# Patient Record
Sex: MALE | Race: WHITE | NOT HISPANIC OR LATINO | Employment: FULL TIME | URBAN - METROPOLITAN AREA
[De-identification: names, ages, dates, MRNs, and addresses within clinical notes are randomized per-mention and may not be internally consistent; named-entity substitution may affect disease eponyms.]

---

## 2017-01-11 ENCOUNTER — ALLSCRIPTS OFFICE VISIT (OUTPATIENT)
Dept: OTHER | Facility: OTHER | Age: 52
End: 2017-01-11

## 2017-01-19 ENCOUNTER — GENERIC CONVERSION - ENCOUNTER (OUTPATIENT)
Dept: OTHER | Facility: OTHER | Age: 52
End: 2017-01-19

## 2017-02-07 ENCOUNTER — ALLSCRIPTS OFFICE VISIT (OUTPATIENT)
Dept: OTHER | Facility: OTHER | Age: 52
End: 2017-02-07

## 2017-02-27 DIAGNOSIS — E11.29 TYPE 2 DIABETES MELLITUS WITH OTHER DIABETIC KIDNEY COMPLICATION (HCC): ICD-10-CM

## 2017-02-27 DIAGNOSIS — B18.2 CHRONIC VIRAL HEPATITIS C (HCC): ICD-10-CM

## 2017-02-27 DIAGNOSIS — R80.9 PROTEINURIA: ICD-10-CM

## 2017-02-27 DIAGNOSIS — I48.91 ATRIAL FIBRILLATION (HCC): ICD-10-CM

## 2017-03-27 ENCOUNTER — TRANSCRIBE ORDERS (OUTPATIENT)
Dept: ADMINISTRATIVE | Facility: HOSPITAL | Age: 52
End: 2017-03-27

## 2017-03-27 ENCOUNTER — APPOINTMENT (OUTPATIENT)
Dept: LAB | Facility: MEDICAL CENTER | Age: 52
End: 2017-03-27
Payer: COMMERCIAL

## 2017-03-27 DIAGNOSIS — R80.9 PROTEINURIA: ICD-10-CM

## 2017-03-27 DIAGNOSIS — B18.2 CHRONIC VIRAL HEPATITIS C (HCC): ICD-10-CM

## 2017-03-27 DIAGNOSIS — I48.91 ATRIAL FIBRILLATION (HCC): ICD-10-CM

## 2017-03-27 DIAGNOSIS — E11.29 TYPE 2 DIABETES MELLITUS WITH OTHER DIABETIC KIDNEY COMPLICATION (HCC): ICD-10-CM

## 2017-03-27 LAB
ALBUMIN SERPL BCP-MCNC: 3.7 G/DL (ref 3.5–5)
ALP SERPL-CCNC: 62 U/L (ref 46–116)
ALT SERPL W P-5'-P-CCNC: 67 U/L (ref 12–78)
ANION GAP SERPL CALCULATED.3IONS-SCNC: 7 MMOL/L (ref 4–13)
AST SERPL W P-5'-P-CCNC: 30 U/L (ref 5–45)
BASOPHILS # BLD AUTO: 0.01 THOUSANDS/ΜL (ref 0–0.1)
BASOPHILS NFR BLD AUTO: 0 % (ref 0–1)
BILIRUB SERPL-MCNC: 0.88 MG/DL (ref 0.2–1)
BUN SERPL-MCNC: 16 MG/DL (ref 5–25)
CALCIUM SERPL-MCNC: 8.5 MG/DL (ref 8.3–10.1)
CHLORIDE SERPL-SCNC: 104 MMOL/L (ref 100–108)
CHOLEST SERPL-MCNC: 122 MG/DL (ref 50–200)
CO2 SERPL-SCNC: 31 MMOL/L (ref 21–32)
CREAT SERPL-MCNC: 0.93 MG/DL (ref 0.6–1.3)
CREAT UR-MCNC: 107 MG/DL
EOSINOPHIL # BLD AUTO: 0.08 THOUSAND/ΜL (ref 0–0.61)
EOSINOPHIL NFR BLD AUTO: 1 % (ref 0–6)
ERYTHROCYTE [DISTWIDTH] IN BLOOD BY AUTOMATED COUNT: 13.6 % (ref 11.6–15.1)
EST. AVERAGE GLUCOSE BLD GHB EST-MCNC: 117 MG/DL
GFR SERPL CREATININE-BSD FRML MDRD: >60 ML/MIN/1.73SQ M
GLUCOSE P FAST SERPL-MCNC: 130 MG/DL (ref 65–99)
HBA1C MFR BLD: 5.7 % (ref 4.2–6.3)
HCT VFR BLD AUTO: 46.1 % (ref 36.5–49.3)
HDLC SERPL-MCNC: 44 MG/DL (ref 40–60)
HGB BLD-MCNC: 16.3 G/DL (ref 12–17)
LDLC SERPL CALC-MCNC: 63 MG/DL (ref 0–100)
LYMPHOCYTES # BLD AUTO: 1.25 THOUSANDS/ΜL (ref 0.6–4.47)
LYMPHOCYTES NFR BLD AUTO: 21 % (ref 14–44)
MCH RBC QN AUTO: 32 PG (ref 26.8–34.3)
MCHC RBC AUTO-ENTMCNC: 35.4 G/DL (ref 31.4–37.4)
MCV RBC AUTO: 91 FL (ref 82–98)
MICROALBUMIN UR-MCNC: 171 MG/L (ref 0–20)
MICROALBUMIN/CREAT 24H UR: 160 MG/G CREATININE (ref 0–30)
MONOCYTES # BLD AUTO: 0.55 THOUSAND/ΜL (ref 0.17–1.22)
MONOCYTES NFR BLD AUTO: 9 % (ref 4–12)
NEUTROPHILS # BLD AUTO: 4.1 THOUSANDS/ΜL (ref 1.85–7.62)
NEUTS SEG NFR BLD AUTO: 69 % (ref 43–75)
NRBC BLD AUTO-RTO: 0 /100 WBCS
PLATELET # BLD AUTO: 99 THOUSANDS/UL (ref 149–390)
PMV BLD AUTO: 11.4 FL (ref 8.9–12.7)
POTASSIUM SERPL-SCNC: 3.7 MMOL/L (ref 3.5–5.3)
PROT SERPL-MCNC: 6.8 G/DL (ref 6.4–8.2)
RBC # BLD AUTO: 5.09 MILLION/UL (ref 3.88–5.62)
SODIUM SERPL-SCNC: 142 MMOL/L (ref 136–145)
TRIGL SERPL-MCNC: 77 MG/DL
TSH SERPL DL<=0.05 MIU/L-ACNC: 1.98 UIU/ML (ref 0.36–3.74)
WBC # BLD AUTO: 6 THOUSAND/UL (ref 4.31–10.16)

## 2017-03-27 PROCEDURE — 83036 HEMOGLOBIN GLYCOSYLATED A1C: CPT

## 2017-03-27 PROCEDURE — 36415 COLL VENOUS BLD VENIPUNCTURE: CPT

## 2017-03-27 PROCEDURE — 82043 UR ALBUMIN QUANTITATIVE: CPT

## 2017-03-27 PROCEDURE — 82570 ASSAY OF URINE CREATININE: CPT

## 2017-03-27 PROCEDURE — 80053 COMPREHEN METABOLIC PANEL: CPT

## 2017-03-27 PROCEDURE — 85025 COMPLETE CBC W/AUTO DIFF WBC: CPT

## 2017-03-27 PROCEDURE — 84443 ASSAY THYROID STIM HORMONE: CPT

## 2017-03-27 PROCEDURE — 80061 LIPID PANEL: CPT

## 2017-03-28 ENCOUNTER — GENERIC CONVERSION - ENCOUNTER (OUTPATIENT)
Dept: OTHER | Facility: OTHER | Age: 52
End: 2017-03-28

## 2017-04-06 ENCOUNTER — GENERIC CONVERSION - ENCOUNTER (OUTPATIENT)
Dept: OTHER | Facility: OTHER | Age: 52
End: 2017-04-06

## 2017-07-11 ENCOUNTER — GENERIC CONVERSION - ENCOUNTER (OUTPATIENT)
Dept: OTHER | Facility: OTHER | Age: 52
End: 2017-07-11

## 2017-07-11 LAB
LEFT EYE DIABETIC RETINOPATHY: NORMAL
RIGHT EYE DIABETIC RETINOPATHY: NORMAL

## 2017-07-25 ENCOUNTER — ALLSCRIPTS OFFICE VISIT (OUTPATIENT)
Dept: OTHER | Facility: OTHER | Age: 52
End: 2017-07-25

## 2017-07-25 DIAGNOSIS — M79.673 PAIN OF FOOT: ICD-10-CM

## 2017-07-25 DIAGNOSIS — M25.579 PAIN IN ANKLE: ICD-10-CM

## 2017-08-15 ENCOUNTER — GENERIC CONVERSION - ENCOUNTER (OUTPATIENT)
Dept: OTHER | Facility: OTHER | Age: 52
End: 2017-08-15

## 2017-10-31 ENCOUNTER — ALLSCRIPTS OFFICE VISIT (OUTPATIENT)
Dept: OTHER | Facility: OTHER | Age: 52
End: 2017-10-31

## 2017-11-06 ENCOUNTER — APPOINTMENT (OUTPATIENT)
Dept: LAB | Facility: MEDICAL CENTER | Age: 52
End: 2017-11-06
Payer: COMMERCIAL

## 2017-11-06 ENCOUNTER — TRANSCRIBE ORDERS (OUTPATIENT)
Dept: ADMINISTRATIVE | Facility: HOSPITAL | Age: 52
End: 2017-11-06

## 2017-11-06 DIAGNOSIS — B18.2 CHRONIC HEPATITIS C WITH HEPATIC COMA (HCC): Primary | ICD-10-CM

## 2017-11-06 DIAGNOSIS — B18.2 CHRONIC HEPATITIS C WITH HEPATIC COMA (HCC): ICD-10-CM

## 2017-11-06 DIAGNOSIS — E11.22 TYPE 2 DIABETES MELLITUS WITH CHRONIC KIDNEY DISEASE, WITH LONG-TERM CURRENT USE OF INSULIN, UNSPECIFIED CKD STAGE (HCC): ICD-10-CM

## 2017-11-06 DIAGNOSIS — M79.673 PAIN OF FOOT, UNSPECIFIED LATERALITY: ICD-10-CM

## 2017-11-06 DIAGNOSIS — B35.3 TINEA PEDIS, UNSPECIFIED LATERALITY: ICD-10-CM

## 2017-11-06 DIAGNOSIS — I48.91 ATRIAL FIBRILLATION, UNSPECIFIED TYPE (HCC): ICD-10-CM

## 2017-11-06 DIAGNOSIS — M25.579 PAIN IN JOINT INVOLVING ANKLE AND FOOT, UNSPECIFIED LATERALITY: ICD-10-CM

## 2017-11-06 DIAGNOSIS — R80.9 PROTEINURIA, UNSPECIFIED TYPE: ICD-10-CM

## 2017-11-06 DIAGNOSIS — Z79.4 TYPE 2 DIABETES MELLITUS WITH CHRONIC KIDNEY DISEASE, WITH LONG-TERM CURRENT USE OF INSULIN, UNSPECIFIED CKD STAGE (HCC): ICD-10-CM

## 2017-11-06 LAB
ALBUMIN SERPL BCP-MCNC: 3.9 G/DL (ref 3.5–5)
ALP SERPL-CCNC: 65 U/L (ref 46–116)
ALT SERPL W P-5'-P-CCNC: 69 U/L (ref 12–78)
ANION GAP SERPL CALCULATED.3IONS-SCNC: 7 MMOL/L (ref 4–13)
AST SERPL W P-5'-P-CCNC: 33 U/L (ref 5–45)
BILIRUB SERPL-MCNC: 0.95 MG/DL (ref 0.2–1)
BUN SERPL-MCNC: 17 MG/DL (ref 5–25)
CALCIUM SERPL-MCNC: 8.5 MG/DL (ref 8.3–10.1)
CHLORIDE SERPL-SCNC: 103 MMOL/L (ref 100–108)
CHOLEST SERPL-MCNC: 123 MG/DL (ref 50–200)
CO2 SERPL-SCNC: 29 MMOL/L (ref 21–32)
CREAT SERPL-MCNC: 0.8 MG/DL (ref 0.6–1.3)
CREAT UR-MCNC: 72.5 MG/DL
ERYTHROCYTE [DISTWIDTH] IN BLOOD BY AUTOMATED COUNT: 13.8 % (ref 11.6–15.1)
EST. AVERAGE GLUCOSE BLD GHB EST-MCNC: 111 MG/DL
FERRITIN SERPL-MCNC: 305 NG/ML (ref 8–388)
GFR SERPL CREATININE-BSD FRML MDRD: 102 ML/MIN/1.73SQ M
GLUCOSE P FAST SERPL-MCNC: 124 MG/DL (ref 65–99)
HBA1C MFR BLD: 5.5 % (ref 4.2–6.3)
HCT VFR BLD AUTO: 46.8 % (ref 36.5–49.3)
HDLC SERPL-MCNC: 43 MG/DL (ref 40–60)
HGB BLD-MCNC: 16.1 G/DL (ref 12–17)
INR PPP: 1.16 (ref 0.86–1.16)
IRON SERPL-MCNC: 86 UG/DL (ref 65–175)
LDLC SERPL CALC-MCNC: 65 MG/DL (ref 0–100)
MCH RBC QN AUTO: 31.4 PG (ref 26.8–34.3)
MCHC RBC AUTO-ENTMCNC: 34.4 G/DL (ref 31.4–37.4)
MCV RBC AUTO: 91 FL (ref 82–98)
MICROALBUMIN UR-MCNC: 105 MG/L (ref 0–20)
MICROALBUMIN/CREAT 24H UR: 145 MG/G CREATININE (ref 0–30)
PLATELET # BLD AUTO: 105 THOUSANDS/UL (ref 149–390)
PMV BLD AUTO: 10.8 FL (ref 8.9–12.7)
POTASSIUM SERPL-SCNC: 3.5 MMOL/L (ref 3.5–5.3)
PROT SERPL-MCNC: 7.3 G/DL (ref 6.4–8.2)
PROTHROMBIN TIME: 14.9 SECONDS (ref 12.1–14.4)
RBC # BLD AUTO: 5.12 MILLION/UL (ref 3.88–5.62)
SODIUM SERPL-SCNC: 139 MMOL/L (ref 136–145)
TIBC SERPL-MCNC: 272 UG/DL (ref 250–450)
TRIGL SERPL-MCNC: 76 MG/DL
TSH SERPL DL<=0.05 MIU/L-ACNC: 1.75 UIU/ML (ref 0.36–3.74)
WBC # BLD AUTO: 5.48 THOUSAND/UL (ref 4.31–10.16)

## 2017-11-06 PROCEDURE — 82247 BILIRUBIN TOTAL: CPT

## 2017-11-06 PROCEDURE — 87902 NFCT AGT GNTYP ALYS HEP C: CPT

## 2017-11-06 PROCEDURE — 82043 UR ALBUMIN QUANTITATIVE: CPT

## 2017-11-06 PROCEDURE — 80061 LIPID PANEL: CPT

## 2017-11-06 PROCEDURE — 87900 PHENOTYPE INFECT AGENT DRUG: CPT

## 2017-11-06 PROCEDURE — 83010 ASSAY OF HAPTOGLOBIN QUANT: CPT

## 2017-11-06 PROCEDURE — 83883 ASSAY NEPHELOMETRY NOT SPEC: CPT

## 2017-11-06 PROCEDURE — 84443 ASSAY THYROID STIM HORMONE: CPT

## 2017-11-06 PROCEDURE — 86709 HEPATITIS A IGM ANTIBODY: CPT

## 2017-11-06 PROCEDURE — 86708 HEPATITIS A ANTIBODY: CPT

## 2017-11-06 PROCEDURE — 82570 ASSAY OF URINE CREATININE: CPT

## 2017-11-06 PROCEDURE — 83036 HEMOGLOBIN GLYCOSYLATED A1C: CPT

## 2017-11-06 PROCEDURE — 82728 ASSAY OF FERRITIN: CPT

## 2017-11-06 PROCEDURE — 87522 HEPATITIS C REVRS TRNSCRPJ: CPT

## 2017-11-06 PROCEDURE — 80307 DRUG TEST PRSMV CHEM ANLYZR: CPT

## 2017-11-06 PROCEDURE — 36415 COLL VENOUS BLD VENIPUNCTURE: CPT

## 2017-11-06 PROCEDURE — 85027 COMPLETE CBC AUTOMATED: CPT

## 2017-11-06 PROCEDURE — 82977 ASSAY OF GGT: CPT

## 2017-11-06 PROCEDURE — 86705 HEP B CORE ANTIBODY IGM: CPT

## 2017-11-06 PROCEDURE — 82172 ASSAY OF APOLIPOPROTEIN: CPT

## 2017-11-06 PROCEDURE — 80053 COMPREHEN METABOLIC PANEL: CPT

## 2017-11-06 PROCEDURE — 85610 PROTHROMBIN TIME: CPT

## 2017-11-06 PROCEDURE — 87340 HEPATITIS B SURFACE AG IA: CPT

## 2017-11-06 PROCEDURE — 86706 HEP B SURFACE ANTIBODY: CPT

## 2017-11-06 PROCEDURE — 84460 ALANINE AMINO (ALT) (SGPT): CPT

## 2017-11-06 PROCEDURE — 86704 HEP B CORE ANTIBODY TOTAL: CPT

## 2017-11-06 PROCEDURE — 83540 ASSAY OF IRON: CPT

## 2017-11-06 PROCEDURE — 83550 IRON BINDING TEST: CPT

## 2017-11-07 ENCOUNTER — GENERIC CONVERSION - ENCOUNTER (OUTPATIENT)
Dept: OTHER | Facility: OTHER | Age: 52
End: 2017-11-07

## 2017-11-07 ENCOUNTER — APPOINTMENT (OUTPATIENT)
Dept: LAB | Facility: HOSPITAL | Age: 52
End: 2017-11-07
Payer: COMMERCIAL

## 2017-11-07 ENCOUNTER — TRANSCRIBE ORDERS (OUTPATIENT)
Dept: ADMINISTRATIVE | Facility: HOSPITAL | Age: 52
End: 2017-11-07

## 2017-11-07 DIAGNOSIS — B18.2 CHRONIC HEPATITIS C WITH HEPATIC COMA (HCC): ICD-10-CM

## 2017-11-07 DIAGNOSIS — B18.2 CHRONIC HEPATITIS C WITH HEPATIC COMA (HCC): Primary | ICD-10-CM

## 2017-11-07 LAB
AMPHETAMINES UR QL SCN: NEGATIVE NG/ML
BARBITURATES UR QL SCN: NEGATIVE NG/ML
BENZODIAZ UR QL SCN: NEGATIVE NG/ML
BZE UR QL SCN: NEGATIVE NG/ML
CANNABINOIDS UR QL SCN: NEGATIVE NG/ML
ETHANOL UR-MCNC: NEGATIVE %
HAV AB SER QL IA: NORMAL
HAV IGM SER QL: NORMAL
HBV CORE AB SER QL: NORMAL
HBV CORE IGM SER QL: NORMAL
HBV SURFACE AB SER-ACNC: <3.1 MIU/ML
HBV SURFACE AG SER QL: NORMAL
METHADONE UR QL SCN: NEGATIVE NG/ML
OPIATES UR QL: NEGATIVE NG/ML
PCP UR QL: NEGATIVE NG/ML
PROPOXYPH UR QL: NEGATIVE NG/ML

## 2017-11-07 PROCEDURE — 36415 COLL VENOUS BLD VENIPUNCTURE: CPT

## 2017-11-07 PROCEDURE — 82595 ASSAY OF CRYOGLOBULIN: CPT

## 2017-11-08 ENCOUNTER — ALLSCRIPTS OFFICE VISIT (OUTPATIENT)
Dept: OTHER | Facility: OTHER | Age: 52
End: 2017-11-08

## 2017-11-08 LAB
HCV RNA SERPL NAA+PROBE-ACNC: NORMAL IU/ML
HCV RNA SERPL NAA+PROBE-LOG IU: 6.53 LOG10 IU/ML
TEST INFORMATION: NORMAL

## 2017-11-09 DIAGNOSIS — D69.6 THROMBOCYTOPENIA (HCC): ICD-10-CM

## 2017-11-09 DIAGNOSIS — Z12.11 ENCOUNTER FOR SCREENING FOR MALIGNANT NEOPLASM OF COLON: ICD-10-CM

## 2017-11-09 DIAGNOSIS — I48.91 ATRIAL FIBRILLATION (HCC): ICD-10-CM

## 2017-11-09 DIAGNOSIS — R35.1 NOCTURIA: ICD-10-CM

## 2017-11-09 LAB
A2 MACROGLOB SERPL-MCNC: 387 MG/DL (ref 110–276)
ALT SERPL W P-5'-P-CCNC: 66 IU/L (ref 0–55)
APO A-I SERPL-MCNC: 140 MG/DL (ref 101–178)
BILIRUB SERPL-MCNC: 0.8 MG/DL (ref 0–1.2)
COMMENT: ABNORMAL
FIBROSIS SCORING:: ABNORMAL
FIBROSIS STAGE SERPL QL: ABNORMAL
GGT SERPL-CCNC: 39 IU/L (ref 0–65)
HAPTOGLOB SERPL-MCNC: 19 MG/DL (ref 34–200)
INTERPRETATIONS: ABNORMAL
LIVER FIBR SCORE SERPL CALC.FIBROSURE: 0.86 (ref 0–0.21)
NECROINFLAMM ACTIVITY SCORING:: ABNORMAL
NECROINFLAMMATORY ACT GRADE SERPL QL: ABNORMAL
NECROINFLAMMATORY ACT SCORE SERPL: 0.6 (ref 0–0.17)
SERVICE CMNT-IMP: ABNORMAL

## 2017-11-10 LAB
HCV GENTYP SERPL NAA+PROBE: NORMAL
HCV PLEASE NOTE: NORMAL

## 2017-11-14 LAB — CRYOGLOB SER QL 1D COLD INC: NORMAL

## 2017-11-17 ENCOUNTER — ALLSCRIPTS OFFICE VISIT (OUTPATIENT)
Dept: OTHER | Facility: OTHER | Age: 52
End: 2017-11-17

## 2017-11-17 LAB — MISCELLANEOUS LAB TEST RESULT: NORMAL

## 2017-11-19 NOTE — PROGRESS NOTES
Assessment  Assessed    1  Atrial fibrillation (427 31) (I48 91)    Plan  Atrial fibrillation    · Digoxin 250 MCG Oral Tablet; take 1 tablet every day  NO MORE SCRIPTS AFTERTHIS UNTIL PATIENT SCHEDULES APPOINTMENT   Rx By: Valarie Montoya; Dispense: 30 Days ; #:30 Tablet; Refill: 11; For: Atrial fibrillation; TALITA = N; Verified Transmission to [facility] #[id number] Last Updated By: System, [employer]; 11/17/2017 4:12:12 PM   · (1) DIGOXIN; Status:Active; Requested for:31Qap4246;    Perform:[facility]; BWI:65RMJ9794; Ordered; For:Atrial fibrillation; Ordered By:Iglesia Weiss;   · EKG/ECG- POC; Status:Complete;   Done: 76SKK2724 03:39PM   Perform: In Office; Last Updated Jai Werner; 11/17/2017 3:39:11 PM;Ordered;fibrillation; Ordered By:Jessica Weissr;   · Follow Up in 2 Years Evaluation and Treatment  Follow-up  Status: Hold For - Scheduling Requested for: 14OOA3295   Ordered;Atrial fibrillation; Ordered By: Valarie Montoya Performed:  Due: 24LAU3019    Discussion/Summary  Cardiology Discussion Summary Free Text Note Form [facility]: It is my impression that the patient is doing well with the diagnosis of atrial fibrillation  His rate is well controlled on monotherapy with digoxin  I have ordered a digoxin level in the near future  He has not had any palpitations  He remains on aspirin for stroke prophylaxis  He has a chads vasc score of 1 (DM) and I think this is reasonable strategy  I will see him again in 2 years time  Chief Complaint  Chief Complaint Free Text Note Form: 18 month FU of palpitations and atrial fibrillation   Chief Complaint Chronic Condition [facility]: Patient is here today for follow up of chronic conditions described in HPI        History of Present Illness  Cardiology HPI Free Text Note Form [facility]: Since the patient's last visit he denies chest pain, shortness of breath, palpitations, edema or lightheadedness      Review of Systems  Cardiology Male ROS:    Cardiac: rhythm problems, but-- no chest pain,-- no fainting/blackouts,-- no heart murmur present,-- no signs of swelling-- and-- no palpitations present  Skin: No complaints of nonhealing sores or skin rash  Genitourinary: erectile dysfunction, but-- no recurrent urinary tract infections,-- no frequent urination at night,-- no difficult urination,-- no blood in urine,-- no kidney stones,-- no loss of bladder control,-- no kidney problems-- and-- no prostate problems  Psychological: No complaints of feeling depressed, anxiety, panic attacks, or difficulty concentrating  General: No complaints of trouble sleeping, lack of energy, fatigue, appetite changes, weight changes, fever, frequent infections, or night sweats  Respiratory: No complaints of shortness of breath, cough with sputum, or wheezing  HEENT: No complaints of serious problems, hearing problems, nose problems, throat problems, or snoring  Gastrointestinal: No complaints of liver problems, nausea, vomiting, heartburn, constipation, bloody stools, diarrhea, problems swallowing, adbominal pain, or rectal bleeding  Hematologic: No complaints of bleeding disorders, anemia, blood clots, or excessive brusing  Neurological: No complaints of numbness, tingling, dizziness, weakness, seizures, headaches, syncope or fainting, AM fatigue, daytime sleepiness, no witnessed apnea episodes  Musculoskeletal: arthritis, but-- no back pain-- and-- no swelling/pain   ROS Reviewed:   ROS reviewed  Active Problems  Problems    1  Allergic rhinitis (477 9) (J30 9)   2  Atrial fibrillation (427 31) (I48 91)   3  Chronic hepatitis C virus infection (070 54) (B18 2)   4  Colon cancer screening (V76 51) (Z12 11)   5  Colon polyp (211 3) (K63 5)   6  DM (diabetes mellitus) type II controlled with renal manifestation (250 40) (E11 29)   7  Encounter for prostate cancer screening (V76 44) (Z12 5)   8  Erectile dysfunction (607 84) (N52 9)   9   Erectile dysfunction of non-organic origin (302 72) (F52 21)   10  Esophageal reflux (530 81) (K21 9)   11  Microalbuminuria (791 0) (R80 9)   12  Nocturia (788 43) (R35 1)   13  Palpitations (785 1) (R00 2)   14  Right bundle branch block (426 4) (I45 10)   15  Screening for diabetic retinopathy (V80 2) (Z13 5)   16  Thrombocytopenia (287 5) (D69 6)   17  Tobacco abuse (305 1) (Z72 0)    Past Medical History  Active Problems And Past Medical History Reviewed: The active problems and past medical history were reviewed and updated today  Surgical History  Problems    1  History of [facility] For Evacuation Of Subdural Hematoma   2  History of Inguinal Hernia Repair   3  History of Shoulder Surgery  Surgical History Reviewed: The surgical history was reviewed and updated today  Family History  Mother    1  Family history of Atrial Fibrillation   2  Family history of Hypertension (V17 49)  Family History Reviewed: The family history was reviewed and updated today  Social History  Problems    · Being A Social Drinker   · Employed   · Exposure to second hand smoke (V15 89) (Z77 22)   · Former smoker (V15 82) (W06 545)   · History of illicit drug use (683 90) (Z87 898)   · Sexual Orientation Heterosexual   · Sexually Active Monogamous Relationship  Social History Reviewed: The social history was reviewed and updated today  The social history was reviewed and is unchanged  Current Meds   1  Aspirin 81 MG TABS; Therapy: 21Jan2013 to Recorded   2  Digoxin 250 MCG Oral Tablet; take 1 tablet every day  NO MORE SCRIPTS AFTER THIS UNTIL PATIENT SCHEDULES APPOINTMENT; Therapy: 56PYV4833 to (Evaluate:10Gpg0802)  Requested for: 37Vqu2653; Last Rx:74Wji8514 Ordered   3  Lisinopril 2 5 MG Oral Tablet; TAKE 1 TABLET DAILY AS DIRECTED; Therapy: 97FSS7044 to (Wilfred Prado)  Requested for: 42AHE0838; Last Rx:08Nov2017 Ordered   4  MetFORMIN HCl - 500 MG Oral Tablet; take 1 tablet twice a day;  Therapy: 92FMP3481 to (Evaluate:85Wat7467)  Requested for: 25HCF1992; Last PK:24DFX3619 Ordered   5  Omeprazole 20 MG Oral Capsule Delayed Release; take 1 capsule daily; Therapy: 63CSE0462 to (Evaluate:51Qfb6516)  Requested for: 09CUT3671; Last Rx:81Kjq6392 Ordered   6  OneTouch Ultra Blue In Vitro Strip; TEST THREE TIMES DAILY; Therapy: 33YPR2434 to (Evaluate:66Fcw7196)  Requested for: 51GED8498; Last Rx:27Zqj9139 Ordered   7  OneTouch Ultra System w/Device Kit; USE AS DIRECTED; Therapy: 80YLK1703 to (Last Rx:06Oct2014)  Requested for: 28Oct2014 Ordered   8  OneTouch UltraSoft Lancets Miscellaneous; TEST Three times DAILY DX: 250; Therapy: 42GEK2321 to (Evaluate:97Sqa9109)  Requested for: 65QMT9576; Last Rx:27Rsu1383 Ordered   9  Viagra 100 MG Oral Tablet; TAKE 1 TABLET 1 HOUR BEFORE ACTIVITY DAILY AS NEEDED; Therapy: 94NRY4020 to (Last Rx:11Jan2017) Ordered  Medication List Reviewed: The medication list was reviewed and updated today  Allergies  Medication    1  No Known Drug Allergies    Vitals  Vital Signs    Recorded: 51NGD5019 03:39PM   Heart Rate 73   Pulse Quality Irregular, L Radial   Respiration Quality Normal   Respiration 16   Systolic 035   Diastolic 84   Height 5 ft 11 in   Weight 212 lb 2 oz   BMI Calculated 29 59   BSA Calculated 2 16       Physical Exam   Constitutional  General appearance: No acute distress, well appearing and well nourished  Eyes  Conjunctiva and Sclera examination: Conjunctiva pink, sclera anicteric  Ears, Nose, Mouth, and Throat - Oropharynx: Clear, nares are clear, mucous membranes are moist   Neck  Neck and thyroid: Normal, supple, trachea midline, no thyromegaly  Pulmonary  Auscultation of lungs: Clear to auscultation, no rales, no rhonchi, no wheezing, good air movement  Cardiovascular  Auscultation of heart: Abnormal  -- Irregular w/o murmur rub or gallop  Carotid pulses: Normal, 2+ bilaterally  Pedal pulses: Normal, 2+ bilaterally     Examination of extremities for edema and/or varicosities: Normal    Chest - Chest: Normal   Abdomen  Abdomen: Non-tender and no distention  Liver and spleen: No hepatomegaly or splenomegaly  Health Management  Colon cancer screening   COLONOSCOPY; every 10 years; Next Due: 14MZE7552; Overdue  DM (diabetes mellitus) type II controlled with renal manifestation   (1) HEMOGLOBIN A1C; every 6 months; Last 38UHL6326; Next Due: 68STT8730; Active  (1) MICROALBUMIN CREATININE RATIO, RANDOM URINE; every 1 year; Last 08OPU1237; Next KJJ:58UYZ4571; Active  *VB - Eye Exam; every 1 year; Next Due: 92VHF0580; Overdue  *VB - Foot Exam; every 1 year; Last 14EKU2104; Next Due: 67YZB5489; Active  Screening for diabetic retinopathy   *VB - Eye Exam; every 1 year; Next Due: 25ZKK3071;  Overdue    Signatures   Electronically signed by : ANILA Villalobos ; Nov 17 2017  4:12PM EST                       (Author)

## 2017-12-28 ENCOUNTER — APPOINTMENT (OUTPATIENT)
Dept: LAB | Facility: MEDICAL CENTER | Age: 52
End: 2017-12-28
Payer: COMMERCIAL

## 2017-12-28 ENCOUNTER — TRANSCRIBE ORDERS (OUTPATIENT)
Dept: ADMINISTRATIVE | Facility: HOSPITAL | Age: 52
End: 2017-12-28

## 2017-12-28 DIAGNOSIS — B18.2 CHRONIC HEPATITIS C WITH HEPATIC COMA (HCC): ICD-10-CM

## 2017-12-28 DIAGNOSIS — B18.2 CHRONIC HEPATITIS C WITH HEPATIC COMA (HCC): Primary | ICD-10-CM

## 2017-12-28 LAB
ALBUMIN SERPL BCP-MCNC: 3.8 G/DL (ref 3.5–5)
ALP SERPL-CCNC: 66 U/L (ref 46–116)
ALT SERPL W P-5'-P-CCNC: 35 U/L (ref 12–78)
ANION GAP SERPL CALCULATED.3IONS-SCNC: 6 MMOL/L (ref 4–13)
AST SERPL W P-5'-P-CCNC: 23 U/L (ref 5–45)
BILIRUB SERPL-MCNC: 0.74 MG/DL (ref 0.2–1)
BUN SERPL-MCNC: 14 MG/DL (ref 5–25)
CALCIUM SERPL-MCNC: 8.4 MG/DL (ref 8.3–10.1)
CHLORIDE SERPL-SCNC: 102 MMOL/L (ref 100–108)
CO2 SERPL-SCNC: 31 MMOL/L (ref 21–32)
CREAT SERPL-MCNC: 0.91 MG/DL (ref 0.6–1.3)
ERYTHROCYTE [DISTWIDTH] IN BLOOD BY AUTOMATED COUNT: 13.7 % (ref 11.6–15.1)
GFR SERPL CREATININE-BSD FRML MDRD: 96 ML/MIN/1.73SQ M
GLUCOSE P FAST SERPL-MCNC: 182 MG/DL (ref 65–99)
HCT VFR BLD AUTO: 47.2 % (ref 36.5–49.3)
HGB BLD-MCNC: 16.3 G/DL (ref 12–17)
INR PPP: 1.2 (ref 0.86–1.16)
MCH RBC QN AUTO: 31.8 PG (ref 26.8–34.3)
MCHC RBC AUTO-ENTMCNC: 34.5 G/DL (ref 31.4–37.4)
MCV RBC AUTO: 92 FL (ref 82–98)
PLATELET # BLD AUTO: 95 THOUSANDS/UL (ref 149–390)
PMV BLD AUTO: 11.7 FL (ref 8.9–12.7)
POTASSIUM SERPL-SCNC: 3.6 MMOL/L (ref 3.5–5.3)
PROT SERPL-MCNC: 6.7 G/DL (ref 6.4–8.2)
PROTHROMBIN TIME: 15.3 SECONDS (ref 12.1–14.4)
RBC # BLD AUTO: 5.13 MILLION/UL (ref 3.88–5.62)
SODIUM SERPL-SCNC: 139 MMOL/L (ref 136–145)
WBC # BLD AUTO: 5.18 THOUSAND/UL (ref 4.31–10.16)

## 2017-12-28 PROCEDURE — 85610 PROTHROMBIN TIME: CPT

## 2017-12-28 PROCEDURE — 87522 HEPATITIS C REVRS TRNSCRPJ: CPT

## 2017-12-28 PROCEDURE — 85027 COMPLETE CBC AUTOMATED: CPT

## 2017-12-28 PROCEDURE — 36415 COLL VENOUS BLD VENIPUNCTURE: CPT

## 2017-12-28 PROCEDURE — 80053 COMPREHEN METABOLIC PANEL: CPT

## 2018-01-02 LAB
HCV RNA SERPL NAA+PROBE-ACNC: NORMAL IU/ML
TEST INFORMATION: NORMAL

## 2018-01-08 ENCOUNTER — TRANSCRIBE ORDERS (OUTPATIENT)
Dept: ADMINISTRATIVE | Facility: HOSPITAL | Age: 53
End: 2018-01-08

## 2018-01-08 ENCOUNTER — APPOINTMENT (OUTPATIENT)
Dept: LAB | Facility: MEDICAL CENTER | Age: 53
End: 2018-01-08
Payer: COMMERCIAL

## 2018-01-08 ENCOUNTER — GENERIC CONVERSION - ENCOUNTER (OUTPATIENT)
Dept: OTHER | Facility: OTHER | Age: 53
End: 2018-01-08

## 2018-01-08 DIAGNOSIS — I48.91 ATRIAL FIBRILLATION (HCC): ICD-10-CM

## 2018-01-08 LAB — DIGOXIN SERPL-MCNC: 0.7 NG/ML (ref 0.8–2)

## 2018-01-08 PROCEDURE — 36415 COLL VENOUS BLD VENIPUNCTURE: CPT

## 2018-01-08 PROCEDURE — 80162 ASSAY OF DIGOXIN TOTAL: CPT

## 2018-01-11 NOTE — RESULT NOTES
Verified Results  (1) MICROALBUMIN CREATININE RATIO, RANDOM URINE 00GHM4854 08:32AM Nelson Butler     Test Name Result Flag Reference   MICROALBUMIN/ CREAT R 145 mg/g creatinine H 0-30   MICROALBUMIN,URINE 105 0 mg/L H 0 0-20 0   CREATININE URINE 72 5 mg/dL       (1) LIPID PANEL, FASTING 77MIW6313 07:22AM Nelson Butler     Test Name Result Flag Reference   CHOLESTEROL 123 mg/dL     HDL,DIRECT 43 mg/dL  40-60   Specimen collection should occur prior to Metamizole administration due to the potential for falsley depressed results  LDL CHOLESTEROL CALCULATED 65 mg/dL  0-100   This is a patient instruction: This is a fasting test  Water, black tea or black coffee only after 9:00pm the night before the test  Drink 2 glasses of water the morning of the test         Triglyceride:        Normal <150 mg/dl   Borderline High 150-199 mg/dl   High 200-499 mg/dl   Very High >499 mg/dl      Cholesterol:       Desirable <200 mg/dl    Borderline High 200-239 mg/dl    High >239 mg/dl      HDL Cholesterol:       High>59 mg/dL    Low <41 mg/dL      This screening LDL is a calculated result  It does not have the accuracy of the Direct Measured LDL in the monitoring of patients with hyperlipidemia and/or statin therapy  Direct Measure LDL (NMZ332) must be ordered separately in these patients  TRIGLYCERIDES 76 mg/dL  <=150   Specimen collection should occur prior to N-Acetylcysteine or Metamizole administration due to the potential for falsely depressed results  (1) TSH 94TFN0886 07:22AM Joanna Butler     Test Name Result Flag Reference   TSH 1 750 uIU/mL  0 358-3 740   This is a patient instruction: This test is non-fasting  Please drink two glasses of water morning of bloodwork  Patients undergoing fluorescein dye angiography may retain small amounts of fluorescein in the body for 48-72 hours post procedure  Samples containing fluorescein can produce falsely depressed TSH values   If the patient had this procedure,a specimen should be resubmitted post fluorescein clearance  (1) HEMOGLOBIN A1C 18CCN0512 07:22AM Nelson Butler     Test Name Result Flag Reference   HEMOGLOBIN A1C 5 5 %  4 2-6 3   EST  AVG  GLUCOSE 111 mg/dl         Plan  DM (diabetes mellitus) type II controlled with renal manifestation    · (1) HEMOGLOBIN A1C ; every 6 months; Last 62COB1364; Next 65BPJ8246;  Status:Active   · (1) MICROALBUMIN CREATININE RATIO, RANDOM URINE ; every 1 year;  Last  32AHO9458; Next 09ORQ5254; Status:Active

## 2018-01-11 NOTE — PROCEDURES
Chief Complaint  Pt was told by employer during hearing test that he needed his ears lavaged  kw      Current Meds   1  Aspirin 81 MG TABS; Therapy: 21Jan2013 to Recorded   2  Digoxin 250 MCG Oral Tablet; take 1 tablet every day; Therapy: 85VOJ7621 to (Evaluate:74Enu4850)  Requested for: 92OYU1862; Last   Rx:07Nov2016 Ordered   3  Ibuprofen 800 MG Oral Tablet; Take 1 tablet 3 times daily as needed; Therapy: 84WLC3948 to (Last Rx:16Nov2016)  Requested for: 12MCK4995 Ordered   4  MetFORMIN HCl - 500 MG Oral Tablet; take 1 tablet twice a day; Therapy: 55AYW5394 to (Evaluate:17Jun2017)  Requested for: 08JNB6558; Last   Rx:31Egx8159 Ordered   5  Omeprazole 20 MG Oral Capsule Delayed Release; take 1 capsule daily; Therapy: 32FET5928 to (Navin Urrutia)  Requested for: 18KAU5394; Last   DS:88SEN7272 Ordered   6  OneTouch Ultra Blue In Vitro Strip; TEST THREE TIMES DAILY; Therapy: 62XCU0285 to (Evaluate:28Mar2017)  Requested for: 74ESM6766; Last   Rx:28Nov2016 Ordered   7  OneTouch Ultra System w/Device Kit; USE AS DIRECTED; Therapy: 16ELK6441 to (Last Rx:06Oct2014)  Requested for: 28Oct2014 Ordered   8  OneTouch UltraSoft Lancets Miscellaneous; TEST Three times DAILY DX: 250; Therapy: 50PEC6091 to (32 32 85)  Requested for: 35KVR8570; Last   Rx:28Nov2016 Ordered   9  TiZANidine HCl - 4 MG Oral Tablet; TAKE 1 TABLET EVERY 8 HOURS AS NEEDED; Therapy: 51XEY7166 to (Last Rx:23Nov2016)  Requested for: 23Nov2016 Ordered   10  Viagra 100 MG Oral Tablet; TAKE 1 TABLET 1 HOUR BEFORE ACTIVITY DAILY AS    NEEDED; Therapy: 24TMB2769 to (Last Rx:11Jan2017) Ordered    Allergies    1   No Known Drug Allergies    Vitals  Signs    Heart Rate: 80  Respiration: 16  Systolic: 021  Diastolic: 78  Height: 5 ft 11 in  Weight: 215 lb   BMI Calculated: 29 99  BSA Calculated: 2 17    Procedure    Procedure: cerumen removal    Indication: tympanic membrane(s) could not be visualized and cerumen impaction in both ears    Procedure Note:   A otoscope was placed in the ear canal(s) to visualize the ear canal debris  The ear was cleaned by using warm water irrigation, a curette and Warm H2O  The procedure was successful  Post-Procedure:   Patient Status: the patient tolerated the procedure well  Complications: there were no complications  Patient instructions: avoid using q-tips  Follow-up as needed  Assessment    1  Bilateral impacted cerumen (380 4) (H61 23)    Plan  Bilateral impacted cerumen    · Follow-up PRN Evaluation and Treatment  Follow-up  Status: Complete  Done:  67NCP6364 04:00PM  DM (diabetes mellitus) type II controlled with renal manifestation    · OneTouch UltraSoft Lancets Miscellaneous; TEST Three times DAILY DX: 250    Discussion/Summary    1  Bilateral cerumen impaction resolved status post lavage with curettage  #2  Return to office if recurrence  The patient has the current Goals: Cerumen removal    The treatment plan was reviewed with the patient/guardian   The patient/guardian understands and agrees with the treatment plan     Self Referrals: No      Signatures   Electronically signed by : Annette Juares DO; Feb 7 2017  4:01PM EST                       (Author)

## 2018-01-13 VITALS
HEART RATE: 80 BPM | WEIGHT: 215 LBS | BODY MASS INDEX: 30.1 KG/M2 | DIASTOLIC BLOOD PRESSURE: 78 MMHG | RESPIRATION RATE: 16 BRPM | HEIGHT: 71 IN | SYSTOLIC BLOOD PRESSURE: 132 MMHG

## 2018-01-13 VITALS
BODY MASS INDEX: 29.7 KG/M2 | SYSTOLIC BLOOD PRESSURE: 138 MMHG | HEART RATE: 73 BPM | RESPIRATION RATE: 16 BRPM | DIASTOLIC BLOOD PRESSURE: 84 MMHG | WEIGHT: 212.13 LBS | HEIGHT: 71 IN

## 2018-01-14 VITALS
BODY MASS INDEX: 30.56 KG/M2 | SYSTOLIC BLOOD PRESSURE: 124 MMHG | HEIGHT: 71 IN | WEIGHT: 218.25 LBS | DIASTOLIC BLOOD PRESSURE: 80 MMHG | HEART RATE: 64 BPM | RESPIRATION RATE: 16 BRPM

## 2018-01-14 VITALS
BODY MASS INDEX: 29.48 KG/M2 | DIASTOLIC BLOOD PRESSURE: 74 MMHG | HEART RATE: 80 BPM | SYSTOLIC BLOOD PRESSURE: 128 MMHG | WEIGHT: 211.38 LBS

## 2018-01-14 NOTE — RESULT NOTES
Verified Results  (1) COMPREHENSIVE METABOLIC PANEL 51IGT6812 90:48MP Jose Lieberman Order Number: [id number]     Test Name Result Flag Reference   GLUCOSE,RANDM 160 mg/dL H    If the patient is fasting, the ADA then defines impaired fasting glucose as > 100 mg/dL and diabetes as > or equal to 123 mg/dL  SODIUM 138 mmol/L  136-145   POTASSIUM 4 0 mmol/L  3 5-5 3   CHLORIDE 102 mmol/L  100-108   CARBON DIOXIDE 31 mmol/L  21-32   ANION GAP (CALC) 5 mmol/L  4-13   BLOOD UREA NITROGEN 17 mg/dL  5-25   CREATININE 0 92 mg/dL  0 60-1 30   Standardized to IDMS reference method   CALCIUM 8 8 mg/dL  8 3-10 1   BILI, TOTAL 0 91 mg/dL  0 20-1 00   ALK PHOSPHATAS 63 U/L     ALT (SGPT) 86 U/L H 12-78   AST(SGOT) 39 U/L  5-45   ALBUMIN 3 8 g/dL  3 5-5 0   TOTAL PROTEIN 7 0 g/dL  6 4-8 2   eGFR Non-African American      >60 0 ml/min/1 73sq m   - Patient Instructions: This is a fasting blood test  Water, black tea or black coffee only after 9:00pm the night before test Drink 2 glasses of water the morning of test   National Kidney Disease Education Program recommendations are as follows:  GFR calculation is accurate only with a steady state creatinine  Chronic Kidney disease less than 60 ml/min/1 73 sq  meters  Kidney failure less than 15 ml/min/1 73 sq  meters       (1) CBC/PLT/DIFF 51XDN4394 08:24AM Jose Lieberman Order Number: [id number]     Test Name Result Flag Reference   WBC COUNT 6 80 Thousand/uL  4 31-10 16   RBC COUNT 4 98 Million/uL  3 88-5 62   HEMOGLOBIN 15 9 g/dL  12 0-17 0   HEMATOCRIT 44 9 %  36 5-49 3   MCV 90 fL  82-98   MCH 31 9 pg  26 8-34 3   MCHC 35 4 g/dL  31 4-37 4   RDW 13 7 %  11 6-15 1   MPV 11 4 fL  8 9-12 7   PLATELET COUNT 551 Thousands/uL L 149-390   nRBC AUTOMATED 0 /100 WBCs     NEUTROPHILS RELATIVE PERCENT 70 %  43-75   LYMPHOCYTES RELATIVE PERCENT 21 %  14-44   MONOCYTES RELATIVE PERCENT 8 %  4-12   EOSINOPHILS RELATIVE PERCENT 1 %  0-6   BASOPHILS RELATIVE PERCENT 0 %  0-1   NEUTROPHILS ABSOLUTE COUNT 4 67 Thousands/?L  1 85-7 62   LYMPHOCYTES ABSOLUTE COUNT 1 43 Thousands/?L  0 60-4 47   MONOCYTES ABSOLUTE COUNT 0 54 Thousand/?L  0 17-1 22   EOSINOPHILS ABSOLUTE COUNT 0 08 Thousand/?L  0 00-0 61   BASOPHILS ABSOLUTE COUNT 0 02 Thousands/?L  0 00-0 10   - Patient Instructions: This bloodwork is non-fasting  Please drink two glasses of water morning of bloodwork  - Patient Instructions: This bloodwork is non-fasting  Please drink two glasses of water morning of bloodwork  (1) HEMOGLOBIN A1C 70IMN9758 08:24AM [facility] Order Number: [id number]     Test Name Result Flag Reference   HEMOGLOBIN A1C 6 2 %  4 2-6 3   EST  AVG  GLUCOSE 131 mg/dl       (1) LIPID PANEL FASTING W DIRECT LDL REFLEX 44SPA8937 08:24AM [facility] Order Number: [id number]     Test Name Result Flag Reference   CHOLESTEROL 123 mg/dL     LDL CHOLESTEROL CALCULATED 66 mg/dL  0-100   - Patient Instructions: This is a fasting blood test  Water, black tea or black coffee only after 9:00pm the night before test   Drink 2 glasses of water the morning of test     - Patient Instructions:  This is a fasting blood test  Water, black tea or black coffee only after 9:00pm the night before test Drink 2 glasses of water the morning of test   Triglyceride:         Normal              <150 mg/dl       Borderline High    150-199 mg/dl       High               200-499 mg/dl       Very High          >499 mg/dl  Cholesterol:         Desirable        <200 mg/dl      Borderline High  200-239 mg/dl      High             >239 mg/dl  HDL Cholesterol:        High    >59 mg/dL      Low     <41 mg/dL  LDL Cholesterol:        Optimal          <100 mg/dl        Near Optimal     100-129 mg/dl        Above Optimal          Borderline High   130-159 mg/dl          High              160-189 mg/dl          Very High        >189 mg/dl  LDL CALCULATED:    This screening LDL is a calculated result  It does not have the accuracy of the Direct Measured LDL in the monitoring of patients with hyperlipidemia and/or statin therapy  Direct Measure LDL (ZFP216) must be ordered separately in these patients  TRIGLYCERIDES 67 mg/dL  <=150   Specimen collection should occur prior to N-Acetylcysteine or Metamizole administration due to the potential for falsely depressed results  HDL,DIRECT 44 mg/dL  40-60   Specimen collection should occur prior to Metamizole administration due to the potential for falsely depressed results  (1) MICROALBUMIN CREATININE RATIO, RANDOM URINE 25Nov2016 08:24AM [employer] Order Number: [id number]     Test Name Result Flag Reference   MICROALBUMIN/ CREAT R 124 mg/g creatinine H 0-30   MICROALBUMIN,URINE 158 0 mg/L H 0 0-20 0   CREATININE URINE 127 0 mg/dL       (1) PSA (SCREEN) (Dx V76 44 Screen for Prostate Cancer) 81FFV2905 08:24AM [employer] Order Number: [id number]     Test Name Result Flag Reference   PROSTATE SPECIFIC ANTIGEN 0 2 ng/mL  0 0-4 0   American Urological Association Guidelines define biochemical recurrence of prostate cancer as a detectable or rising PSA value post-radical prostatectomy that is greater than or equal to 0 2 ng/mL with a second confirmatory level of greater than or equal to 0 2 ng/mL  - Patient Instructions: This test is non-fasting  Please drink two glasses of water morning of bloodwork  - Patient Instructions: This test is non-fasting  Please drink two glasses of water morning of bloodwork  (1) TSH WITH FT4 REFLEX [id number] 08:24AM [employer] Order Number: [id number]     Test Name Result Flag Reference   TSH 2 360 uIU/mL  0 358-3 740   - Patient Instructions:  This is a fasting blood test  Water, black tea or black coffee only after 9:00pm the night before test Drink 2 glasses of water the morning of test   Patients undergoing fluorescein dye angiography may retain small amounts of fluorescein in the body for 48-72 hours post procedure  Samples containing fluorescein can produce falsely depressed TSH values  If the patient had this procedure,a specimen should be resubmitted post fluorescein clearance  Plan  DM (diabetes mellitus) type II controlled with renal manifestation    · (1) HEMOGLOBIN A1C ; every 6 months; Last 95DRL2598; Status:Active   · (1) MICROALBUMIN CREATININE RATIO, RANDOM URINE ; every 1 year;  Last  76KAL0490; Status:Active

## 2018-01-15 VITALS
HEART RATE: 80 BPM | RESPIRATION RATE: 16 BRPM | BODY MASS INDEX: 29.33 KG/M2 | HEIGHT: 71 IN | SYSTOLIC BLOOD PRESSURE: 128 MMHG | DIASTOLIC BLOOD PRESSURE: 78 MMHG | WEIGHT: 209.5 LBS

## 2018-01-16 ENCOUNTER — GENERIC CONVERSION - ENCOUNTER (OUTPATIENT)
Dept: FAMILY MEDICINE CLINIC | Facility: CLINIC | Age: 53
End: 2018-01-16

## 2018-01-16 NOTE — RESULT NOTES
Verified Results  (1) COMPREHENSIVE METABOLIC PANEL [id number] 37:20VY [facility] Primer Order Number: [id number]     Test Name Result Flag Reference   SODIUM 142 mmol/L  136-145   POTASSIUM 3 7 mmol/L  3 5-5 3   CHLORIDE 104 mmol/L  100-108   CARBON DIOXIDE 31 mmol/L  21-32   ANION GAP (CALC) 7 mmol/L  4-13   BLOOD UREA NITROGEN 16 mg/dL  5-25   CREATININE 0 93 mg/dL  0 60-1 30   Standardized to IDMS reference method   CALCIUM 8 5 mg/dL  8 3-10 1   BILI, TOTAL 0 88 mg/dL  0 20-1 00   ALK PHOSPHATAS 62 U/L     ALT (SGPT) 67 U/L  12-78   AST(SGOT) 30 U/L  5-45   ALBUMIN 3 7 g/dL  3 5-5 0   TOTAL PROTEIN 6 8 g/dL  6 4-8 2   eGFR Non-African American      >60 0 ml/min/1 73sq m   - Patient Instructions: This is a fasting blood test  Water, black tea or black coffee only after 9:00pm the night before test Drink 2 glasses of water the morning of test   National Kidney Disease Education Program recommendations are as follows:  GFR calculation is accurate only with a steady state creatinine  Chronic Kidney disease less than 60 ml/min/1 73 sq  meters  Kidney failure less than 15 ml/min/1 73 sq  meters  GLUCOSE FASTING 130 mg/dL H 65-99     (1) CBC/PLT/DIFF [id number] 07:09AM [facility] Primer Order Number: [id number]     Test Name Result Flag Reference   WBC COUNT 6 00 Thousand/uL  4 31-10 16   RBC COUNT 5 09 Million/uL  3 88-5 62   HEMOGLOBIN 16 3 g/dL  12 0-17 0   HEMATOCRIT 46 1 %  36 5-49 3   MCV 91 fL  82-98   MCH 32 0 pg  26 8-34 3   MCHC 35 4 g/dL  31 4-37 4   RDW 13 6 %  11 6-15 1   MPV 11 4 fL  8 9-12 7   PLATELET COUNT 99 Thousands/uL L 149-390   Manual Review of Smear Performed   nRBC AUTOMATED 0 /100 WBCs     NEUTROPHILS RELATIVE PERCENT 69 %  43-75   LYMPHOCYTES RELATIVE PERCENT 21 %  14-44   MONOCYTES RELATIVE PERCENT 9 %  4-12   EOSINOPHILS RELATIVE PERCENT 1 %  0-6   BASOPHILS RELATIVE PERCENT 0 %  0-1   NEUTROPHILS ABSOLUTE COUNT 4 10 Thousands/? ??L  1 85-7 62 LYMPHOCYTES ABSOLUTE COUNT 1 25 Thousands/? ??L  0 60-4 47   MONOCYTES ABSOLUTE COUNT 0 55 Thousand/? ??L  0 17-1 22   EOSINOPHILS ABSOLUTE COUNT 0 08 Thousand/? ??L  0 00-0 61   BASOPHILS ABSOLUTE COUNT 0 01 Thousands/? ??L  0 00-0 10   - Patient Instructions: This bloodwork is non-fasting  Please drink two glasses of water morning of bloodwork  - Patient Instructions: This bloodwork is non-fasting  Please drink two glasses of water morning of bloodwork  (1) HEMOGLOBIN A1C [id number] 07:09AM Anusha Benavidez Order Number: [id number]     Test Name Result Flag Reference   HEMOGLOBIN A1C 5 7 %  4 2-6 3   EST  AVG  GLUCOSE 117 mg/dl       (1) MICROALBUMIN CREATININE RATIO, RANDOM URINE 27Mar2017 07:09AM Anusha Benavidez Order Number: [id number]     Test Name Result Flag Reference   MICROALBUMIN/ CREAT R 160 mg/g creatinine H 0-30   MICROALBUMIN,URINE 171 0 mg/L H 0 0-20 0   CREATININE URINE 107 0 mg/dL       (1) LIPID PANEL FASTING W DIRECT LDL REFLEX [id number] 07:09AM Anusha Benavidez Order Number: [id number]     Test Name Result Flag Reference   CHOLESTEROL 122 mg/dL     LDL CHOLESTEROL CALCULATED 63 mg/dL  0-100   - Patient Instructions: This is a fasting blood test  Water, black tea or black coffee only after 9:00pm the night before test   Drink 2 glasses of water the morning of test     - Patient Instructions:  This is a fasting blood test  Water, black tea or black coffee only after 9:00pm the night before test Drink 2 glasses of water the morning of test   Triglyceride:         Normal              <150 mg/dl       Borderline High    150-199 mg/dl       High               200-499 mg/dl       Very High          >499 mg/dl  Cholesterol:         Desirable        <200 mg/dl      Borderline High  200-239 mg/dl      High             >239 mg/dl  HDL Cholesterol:        High    >59 mg/dL      Low     <41 mg/dL  LDL Cholesterol:        Optimal          <100 mg/dl Near Optimal     100-129 mg/dl        Above Optimal          Borderline High   130-159 mg/dl          High              160-189 mg/dl          Very High        >189 mg/dl  LDL CALCULATED:    This screening LDL is a calculated result  It does not have the accuracy of the Direct Measured LDL in the monitoring of patients with hyperlipidemia and/or statin therapy  Direct Measure LDL (JUT046) must be ordered separately in these patients  TRIGLYCERIDES 77 mg/dL  <=150   Specimen collection should occur prior to N-Acetylcysteine or Metamizole administration due to the potential for falsely depressed results  HDL,DIRECT 44 mg/dL  40-60   Specimen collection should occur prior to Metamizole administration due to the potential for falsely depressed results  (1) TSH WITH FT4 REFLEX [id number] 07:09AM Rufino Hageny Order Number: [id number]     Test Name Result Flag Reference   TSH 1 980 uIU/mL  0 358-3 740   - Patient Instructions: This is a fasting blood test  Water, black tea or black coffee only after 9:00pm the night before test Drink 2 glasses of water the morning of test   Patients undergoing fluorescein dye angiography may retain small amounts of fluorescein in the body for 48-72 hours post procedure  Samples containing fluorescein can produce falsely depressed TSH values  If the patient had this procedure,a specimen should be resubmitted post fluorescein clearance  Plan  DM (diabetes mellitus) type II controlled with renal manifestation    · (1) HEMOGLOBIN A1C ; every 6 months; Last [id number]; Status:Active   · (1) MICROALBUMIN CREATININE RATIO, RANDOM URINE ; every 1 year;  Last  76GTT0537; Status:Active

## 2018-01-17 NOTE — MISCELLANEOUS
Message  Pt  called in requested pain medication for new onset of back pain  Pt  was advised that since we have not seen patient for this issue in the past he would need to be seen and evaluated  OV offered to pt  for tomorrow, but pt  stated he is not able to get out of work for [address]  Pt  was advised to be seen at the ER or [facility] if pain continues or becomes worse  Active Problems    1  Atrial fibrillation (427 31) (I48 91)   2  Chronic hepatitis C virus infection (070 54) (B18 2)   3  Colon cancer screening (V76 51) (Z12 11)   4  DM (diabetes mellitus) type II controlled with renal manifestation (250 40) (E11 29)   5  Encounter for prostate cancer screening (V76 44) (Z12 5)   6  Erectile dysfunction (607 84) (N52 9)   7  Erectile dysfunction of non-organic origin (302 72) (F52 21)   8  Esophageal reflux (530 81) (K21 9)   9  Foot pain (729 5) (M79 673)   10  Microalbuminuria (791 0) (R80 9)   11  Nocturia (788 43) (R35 1)   12  Palpitations (785 1) (R00 2)   13  Right bundle branch block (426 4) (I45 10)   14  Screening for diabetic retinopathy (V80 2) (Z13 5)   15  Urinary urgency (788 63) (R39 15)    Current Meds   1  Aspirin 81 MG TABS; Therapy: 21Jan2013 to Recorded   2  Digoxin 250 MCG Oral Tablet; take 1 tablet every day; Therapy: 81QSK0370 to (Evaluate:66Kya4784)  Requested for: 85IPX4594; Last   Rx:07Nov2016 Ordered   3  Ibuprofen 800 MG Oral Tablet; Take 1 tablet 3 times daily as needed; Therapy: 49FKP6001 to (Last Rx:16Nov2016)  Requested for: 36EWB4135 Ordered   4  MetFORMIN HCl - 500 MG Oral Tablet; take 1 tablet twice a day; Therapy: 58NSL9417 to (Evaluate:81Ifi3463)  Requested for: 50LEX3243; Last   Rx:13Jun2016 Ordered   5  Omeprazole 20 MG Oral Capsule Delayed Release; take 1 capsule daily; Therapy: 68FXQ7692 to (Evaluate:69Ycr6668)  Requested for: 91LXJ2857; Last   Rx:13Jun2016 Ordered   6  OneTouch Ultra Blue In Vitro Strip; TEST THREE TIMES DAILY;    Therapy: 76TLZ0818 to (Gustavo Chaparro)  Requested for: 57TAV3426; Last   Rx:98Mgo8696 Ordered   7  OneTouch Ultra System w/Device Kit; USE AS DIRECTED; Therapy: 24INF0690 to (Last Rx:06Oct2014)  Requested for: 28Oct2014 Ordered   8  OneTouch UltraSoft Lancets Miscellaneous; TEST Three times DAILY DX: 250; Therapy: 60QAM6445 to ([facility])  Requested for: 91TYM0316; Last   Rx:22Pvg2493 Ordered    Allergies    1   No Known Drug Allergies    Signatures   Electronically signed by : Ruddy Baldwin, ; Nov 22 2016  7:27PM EST                       (Author)

## 2018-01-23 NOTE — RESULT NOTES
Verified Results  (1) DIGOXIN [id number] 06:44AM Flora Jay Order Number: [id number]     Test Name Result Flag Reference   DIGOXIN 0 7 ng/mL L 0 8-2 0

## 2018-02-20 DIAGNOSIS — I48.20 CHRONIC ATRIAL FIBRILLATION (HCC): Primary | ICD-10-CM

## 2018-02-20 RX ORDER — DIGOXIN 250 MCG
TABLET ORAL
Qty: 90 TABLET | Refills: 0 | Status: SHIPPED | OUTPATIENT
Start: 2018-02-20 | End: 2018-05-11 | Stop reason: SDUPTHER

## 2018-03-30 ENCOUNTER — APPOINTMENT (OUTPATIENT)
Dept: LAB | Facility: MEDICAL CENTER | Age: 53
End: 2018-03-30
Payer: COMMERCIAL

## 2018-03-30 ENCOUNTER — TRANSCRIBE ORDERS (OUTPATIENT)
Dept: ADMINISTRATIVE | Facility: HOSPITAL | Age: 53
End: 2018-03-30

## 2018-03-30 DIAGNOSIS — B18.2 CHRONIC HEPATITIS C WITH HEPATIC COMA (HCC): ICD-10-CM

## 2018-03-30 DIAGNOSIS — B18.2 CHRONIC HEPATITIS C WITH HEPATIC COMA (HCC): Primary | ICD-10-CM

## 2018-03-30 LAB
ANION GAP SERPL CALCULATED.3IONS-SCNC: 5 MMOL/L (ref 4–13)
BUN SERPL-MCNC: 17 MG/DL (ref 5–25)
CALCIUM SERPL-MCNC: 8.7 MG/DL (ref 8.3–10.1)
CHLORIDE SERPL-SCNC: 104 MMOL/L (ref 100–108)
CO2 SERPL-SCNC: 32 MMOL/L (ref 21–32)
CREAT SERPL-MCNC: 0.85 MG/DL (ref 0.6–1.3)
ERYTHROCYTE [DISTWIDTH] IN BLOOD BY AUTOMATED COUNT: 13.6 % (ref 11.6–15.1)
GFR SERPL CREATININE-BSD FRML MDRD: 99 ML/MIN/1.73SQ M
GLUCOSE P FAST SERPL-MCNC: 82 MG/DL (ref 65–99)
HCT VFR BLD AUTO: 46.1 % (ref 36.5–49.3)
HGB BLD-MCNC: 15.9 G/DL (ref 12–17)
MCH RBC QN AUTO: 31.4 PG (ref 26.8–34.3)
MCHC RBC AUTO-ENTMCNC: 34.5 G/DL (ref 31.4–37.4)
MCV RBC AUTO: 91 FL (ref 82–98)
PLATELET # BLD AUTO: 109 THOUSANDS/UL (ref 149–390)
PMV BLD AUTO: 11.4 FL (ref 8.9–12.7)
POTASSIUM SERPL-SCNC: 3.8 MMOL/L (ref 3.5–5.3)
RBC # BLD AUTO: 5.07 MILLION/UL (ref 3.88–5.62)
SODIUM SERPL-SCNC: 141 MMOL/L (ref 136–145)
WBC # BLD AUTO: 6.06 THOUSAND/UL (ref 4.31–10.16)

## 2018-03-30 PROCEDURE — 80048 BASIC METABOLIC PNL TOTAL CA: CPT

## 2018-03-30 PROCEDURE — 85027 COMPLETE CBC AUTOMATED: CPT

## 2018-03-30 PROCEDURE — 87522 HEPATITIS C REVRS TRNSCRPJ: CPT

## 2018-03-30 PROCEDURE — 36415 COLL VENOUS BLD VENIPUNCTURE: CPT

## 2018-04-03 LAB
HCV RNA SERPL NAA+PROBE-ACNC: NORMAL IU/ML
TEST INFORMATION: NORMAL

## 2018-04-04 ENCOUNTER — OFFICE VISIT (OUTPATIENT)
Dept: FAMILY MEDICINE CLINIC | Facility: CLINIC | Age: 53
End: 2018-04-04
Payer: COMMERCIAL

## 2018-04-04 VITALS
DIASTOLIC BLOOD PRESSURE: 78 MMHG | BODY MASS INDEX: 28.99 KG/M2 | RESPIRATION RATE: 16 BRPM | HEART RATE: 72 BPM | HEIGHT: 72 IN | SYSTOLIC BLOOD PRESSURE: 132 MMHG | WEIGHT: 214 LBS

## 2018-04-04 DIAGNOSIS — L30.9 HAND DERMATITIS: Primary | ICD-10-CM

## 2018-04-04 PROBLEM — R80.9 CONTROLLED TYPE 2 DIABETES MELLITUS WITH MICROALBUMINURIA, WITHOUT LONG-TERM CURRENT USE OF INSULIN (HCC): Status: ACTIVE | Noted: 2017-04-08

## 2018-04-04 PROBLEM — E11.29 CONTROLLED TYPE 2 DIABETES MELLITUS WITH MICROALBUMINURIA, WITHOUT LONG-TERM CURRENT USE OF INSULIN (HCC): Status: ACTIVE | Noted: 2017-04-08

## 2018-04-04 PROBLEM — K63.5 COLON POLYP: Status: ACTIVE | Noted: 2017-11-08

## 2018-04-04 PROBLEM — D69.6 THROMBOCYTOPENIA (HCC): Status: ACTIVE | Noted: 2017-11-08

## 2018-04-04 PROBLEM — J30.9 ALLERGIC RHINITIS: Status: ACTIVE | Noted: 2017-07-25

## 2018-04-04 PROBLEM — Z72.0 TOBACCO ABUSE: Status: ACTIVE | Noted: 2017-07-25

## 2018-04-04 PROCEDURE — 99213 OFFICE O/P EST LOW 20 MIN: CPT | Performed by: FAMILY MEDICINE

## 2018-04-04 RX ORDER — LISINOPRIL 2.5 MG/1
TABLET ORAL
COMMUNITY
Start: 2018-02-18 | End: 2018-11-07 | Stop reason: SDUPTHER

## 2018-04-04 RX ORDER — CLOTRIMAZOLE AND BETAMETHASONE DIPROPIONATE 10; .64 MG/G; MG/G
CREAM TOPICAL
Qty: 30 G | Refills: 1 | Status: SHIPPED | OUTPATIENT
Start: 2018-04-04 | End: 2019-01-19

## 2018-04-04 RX ORDER — SILDENAFIL 100 MG/1
TABLET, FILM COATED ORAL
COMMUNITY
Start: 2017-01-11 | End: 2019-01-19

## 2018-04-04 RX ORDER — OMEPRAZOLE 20 MG/1
CAPSULE, DELAYED RELEASE ORAL
COMMUNITY
Start: 2016-08-07 | End: 2018-05-11 | Stop reason: SDUPTHER

## 2018-04-04 RX ORDER — KETOCONAZOLE 20 MG/G
CREAM TOPICAL
COMMUNITY
Start: 2017-12-31 | End: 2018-06-19

## 2018-04-04 RX ORDER — VELPATASVIR AND SOFOSBUVIR 100; 400 MG/1; MG/1
1 TABLET, FILM COATED ORAL DAILY
COMMUNITY
Start: 2018-01-03 | End: 2018-09-11 | Stop reason: ALTCHOICE

## 2018-04-04 NOTE — PROGRESS NOTES
Assessment/Plan:  1  Hand dermatitis question eczema verses fungal, Lotrisone cream was prescribed  Return in 1 week if still symptoms    No problem-specific Assessment & Plan notes found for this encounter  Diagnoses and all orders for this visit:    Hand dermatitis  -     clotrimazole-betamethasone (LOTRISONE) 1-0 05 % cream; Apply to left hand twice daily          Subjective:     Cc: Pt complains of rash on left hand x 1 week  Pt denies pain or injury to area  R Abhijeet     Patient ID: Santa Mcdaniel is a 46 y o  male  For the past week patient has had dry itchy areas on left hand  Patient does work with a lot of painting and pain supplies  But nothing new  The following portions of the patient's history were reviewed and updated as appropriate: allergies, current medications, past family history, past medical history, past social history, past surgical history and problem list     Review of Systems   Constitutional: Negative for chills and fever  Skin:        HPI         Objective:      Vitals:    04/04/18 1545   BP: 132/78   BP Location: Left arm   Patient Position: Sitting   Cuff Size: Standard   Pulse: 72   Resp: 16   Weight: 97 1 kg (214 lb)   Height: 6' (1 829 m)          Physical Exam   Constitutional: He is oriented to person, place, and time  He appears well-developed and well-nourished  HENT:   Head: Normocephalic  Eyes: Conjunctivae are normal  No scleral icterus  Pulmonary/Chest: Effort normal    Neurological: He is alert and oriented to person, place, and time  Skin:   Patient has multiple xerotic patch is left hand

## 2018-05-11 DIAGNOSIS — E11.9 TYPE 2 DIABETES MELLITUS WITHOUT COMPLICATION, UNSPECIFIED WHETHER LONG TERM INSULIN USE (HCC): Primary | ICD-10-CM

## 2018-05-11 DIAGNOSIS — I48.20 CHRONIC ATRIAL FIBRILLATION (HCC): ICD-10-CM

## 2018-05-11 RX ORDER — OMEPRAZOLE 20 MG/1
CAPSULE, DELAYED RELEASE ORAL
Qty: 90 CAPSULE | Refills: 1 | Status: SHIPPED | OUTPATIENT
Start: 2018-05-11 | End: 2018-11-07 | Stop reason: SDUPTHER

## 2018-05-11 RX ORDER — DIGOXIN 250 UG/1
TABLET ORAL
Qty: 90 TABLET | Refills: 1 | Status: SHIPPED | OUTPATIENT
Start: 2018-05-11 | End: 2018-06-19

## 2018-05-31 ENCOUNTER — APPOINTMENT (OUTPATIENT)
Dept: LAB | Facility: MEDICAL CENTER | Age: 53
End: 2018-05-31
Payer: COMMERCIAL

## 2018-05-31 ENCOUNTER — OFFICE VISIT (OUTPATIENT)
Dept: FAMILY MEDICINE CLINIC | Facility: CLINIC | Age: 53
End: 2018-05-31
Payer: COMMERCIAL

## 2018-05-31 ENCOUNTER — TRANSCRIBE ORDERS (OUTPATIENT)
Dept: ADMINISTRATIVE | Facility: HOSPITAL | Age: 53
End: 2018-05-31

## 2018-05-31 VITALS
HEIGHT: 72 IN | SYSTOLIC BLOOD PRESSURE: 114 MMHG | BODY MASS INDEX: 28.99 KG/M2 | TEMPERATURE: 98.3 F | WEIGHT: 214 LBS | DIASTOLIC BLOOD PRESSURE: 82 MMHG

## 2018-05-31 DIAGNOSIS — R80.9 CONTROLLED TYPE 2 DIABETES MELLITUS WITH MICROALBUMINURIA, WITHOUT LONG-TERM CURRENT USE OF INSULIN (HCC): ICD-10-CM

## 2018-05-31 DIAGNOSIS — I48.91 ATRIAL FIBRILLATION, UNSPECIFIED TYPE (HCC): ICD-10-CM

## 2018-05-31 DIAGNOSIS — B18.2 CHRONIC HEPATITIS C WITH HEPATIC COMA (HCC): ICD-10-CM

## 2018-05-31 DIAGNOSIS — E11.29 CONTROLLED TYPE 2 DIABETES MELLITUS WITH MICROALBUMINURIA, WITHOUT LONG-TERM CURRENT USE OF INSULIN (HCC): ICD-10-CM

## 2018-05-31 DIAGNOSIS — B18.2 CHRONIC HEPATITIS C WITH HEPATIC COMA (HCC): Primary | ICD-10-CM

## 2018-05-31 DIAGNOSIS — I87.2 VENOUS STASIS DERMATITIS OF RIGHT LOWER EXTREMITY: Primary | ICD-10-CM

## 2018-05-31 DIAGNOSIS — D69.6 THROMBOCYTOPENIA (HCC): ICD-10-CM

## 2018-05-31 LAB
ALBUMIN SERPL BCP-MCNC: 3.9 G/DL (ref 3.5–5)
ALP SERPL-CCNC: 64 U/L (ref 46–116)
ALT SERPL W P-5'-P-CCNC: 36 U/L (ref 12–78)
ANION GAP SERPL CALCULATED.3IONS-SCNC: 5 MMOL/L (ref 4–13)
AST SERPL W P-5'-P-CCNC: 24 U/L (ref 5–45)
BILIRUB SERPL-MCNC: 1.31 MG/DL (ref 0.2–1)
BUN SERPL-MCNC: 16 MG/DL (ref 5–25)
CALCIUM SERPL-MCNC: 8.6 MG/DL (ref 8.3–10.1)
CHLORIDE SERPL-SCNC: 103 MMOL/L (ref 100–108)
CO2 SERPL-SCNC: 32 MMOL/L (ref 21–32)
CREAT SERPL-MCNC: 0.92 MG/DL (ref 0.6–1.3)
ERYTHROCYTE [DISTWIDTH] IN BLOOD BY AUTOMATED COUNT: 13.5 % (ref 11.6–15.1)
GFR SERPL CREATININE-BSD FRML MDRD: 94 ML/MIN/1.73SQ M
GLUCOSE P FAST SERPL-MCNC: 178 MG/DL (ref 65–99)
HCT VFR BLD AUTO: 45.4 % (ref 36.5–49.3)
HGB BLD-MCNC: 14.8 G/DL (ref 12–17)
MCH RBC QN AUTO: 31.4 PG (ref 26.8–34.3)
MCHC RBC AUTO-ENTMCNC: 32.6 G/DL (ref 31.4–37.4)
MCV RBC AUTO: 96 FL (ref 82–98)
PLATELET # BLD AUTO: 103 THOUSANDS/UL (ref 149–390)
PMV BLD AUTO: 10.9 FL (ref 8.9–12.7)
POTASSIUM SERPL-SCNC: 3.7 MMOL/L (ref 3.5–5.3)
PROT SERPL-MCNC: 6.6 G/DL (ref 6.4–8.2)
RBC # BLD AUTO: 4.72 MILLION/UL (ref 3.88–5.62)
SODIUM SERPL-SCNC: 140 MMOL/L (ref 136–145)
WBC # BLD AUTO: 5.4 THOUSAND/UL (ref 4.31–10.16)

## 2018-05-31 PROCEDURE — 36415 COLL VENOUS BLD VENIPUNCTURE: CPT

## 2018-05-31 PROCEDURE — 80053 COMPREHEN METABOLIC PANEL: CPT

## 2018-05-31 PROCEDURE — 85027 COMPLETE CBC AUTOMATED: CPT

## 2018-05-31 PROCEDURE — 99214 OFFICE O/P EST MOD 30 MIN: CPT | Performed by: PHYSICIAN ASSISTANT

## 2018-05-31 PROCEDURE — 87522 HEPATITIS C REVRS TRNSCRPJ: CPT

## 2018-05-31 NOTE — PATIENT INSTRUCTIONS
1   Venous stasis dermatitis-patient appears to have small petechial bleed at the distal extremity  This ends abruptly at the sock line  He also has a history of thrombocytopenia which may be contributory  At this point I would recommend wearing higher compressive stockings as he is on his feet for 8 hr daily  Would recommend elevating his feet at home  He has no pain or asymmetry in the lower extremities or other sign of clot present  2   Atrial fibrillation-patient continues aspirin therapy and digoxin and follows with Cardiology  3   Type 2 diabetes-presently stable with metformin and endocrinology

## 2018-05-31 NOTE — PROGRESS NOTES
Assessment/Plan:  Patient Instructions   1  Venous stasis dermatitis-patient appears to have small petechial bleed at the distal extremity  This ends abruptly at the sock line  He also has a history of thrombocytopenia which may be contributory  At this point I would recommend wearing higher compressive stockings as he is on his feet for 8 hr daily  Would recommend elevating his feet at home  He has no pain or asymmetry in the lower extremities or other sign of clot present  2   Atrial fibrillation-patient continues aspirin therapy and digoxin and follows with Cardiology  3   Type 2 diabetes-presently stable with metformin and endocrinology  Diagnoses and all orders for this visit:    Venous stasis dermatitis of right lower extremity    Controlled type 2 diabetes mellitus with microalbuminuria, without long-term current use of insulin (HCC)    Atrial fibrillation, unspecified type (HCC)    Thrombocytopenia (HCC)          Subjective:   c/o red rash on right calf area  mjs     Patient ID: Jaida Ribeiro is a 46 y o  male  HPI:  This is a 54-year-old gentleman that presents to the office with a history of type 2 diabetes and atrial fibrillation  He is concerned because he noticed a rash on his lower right extremity  It has not been itching or bothering him in any way but his wife wanted him to get checked  He states that he does work long hours standing for about 8 hr at a time  He occasionally has a little swelling in the lower extremities but does not look at them often  He does not have any pain or bother from them  He is currently on aspirin therapy daily for atrial fibrillation and he does have a history of thrombocytopenia          The following portions of the patient's history were reviewed and updated as appropriate: allergies, current medications, past family history, past medical history, past social history, past surgical history and problem list     Review of Systems Constitutional: Negative for fever  HENT: Negative for congestion  Respiratory: Negative for cough, chest tightness and shortness of breath  Cardiovascular: Negative for chest pain  Musculoskeletal: Negative for arthralgias  Skin: Positive for rash  Objective:      /82   Temp 98 3 °F (36 8 °C)   Ht 6' (1 829 m)   Wt 97 1 kg (214 lb)   BMI 29 02 kg/m²          Physical Exam   Constitutional: He is oriented to person, place, and time  He appears well-developed and well-nourished  HENT:   Head: Normocephalic  Cardiovascular: Normal rate and regular rhythm  Pulmonary/Chest: Effort normal and breath sounds normal  No respiratory distress  Abdominal: Soft  Musculoskeletal: Normal range of motion  Negative Homans sign, trace edema bilateral lower extremities, symmetrical    Neurological: He is alert and oriented to person, place, and time  Skin:   Right lower extremity with multiple medial petechia and trace edema present  Petechia and abruptly at the sock line  There is no tenderness to palpation  Area is not blanchable  Psychiatric: He has a normal mood and affect

## 2018-06-04 LAB
HCV RNA SERPL NAA+PROBE-ACNC: NORMAL IU/ML
TEST INFORMATION: NORMAL

## 2018-06-19 ENCOUNTER — OFFICE VISIT (OUTPATIENT)
Dept: FAMILY MEDICINE CLINIC | Facility: CLINIC | Age: 53
End: 2018-06-19
Payer: COMMERCIAL

## 2018-06-19 VITALS
SYSTOLIC BLOOD PRESSURE: 124 MMHG | WEIGHT: 214.8 LBS | DIASTOLIC BLOOD PRESSURE: 84 MMHG | BODY MASS INDEX: 29.13 KG/M2 | HEART RATE: 68 BPM

## 2018-06-19 DIAGNOSIS — L95.9 VASCULITIS OF SKIN: Primary | ICD-10-CM

## 2018-06-19 DIAGNOSIS — D69.6 THROMBOCYTOPENIA (HCC): ICD-10-CM

## 2018-06-19 DIAGNOSIS — E11.29 CONTROLLED TYPE 2 DIABETES MELLITUS WITH MICROALBUMINURIA, WITHOUT LONG-TERM CURRENT USE OF INSULIN (HCC): ICD-10-CM

## 2018-06-19 DIAGNOSIS — R80.9 CONTROLLED TYPE 2 DIABETES MELLITUS WITH MICROALBUMINURIA, WITHOUT LONG-TERM CURRENT USE OF INSULIN (HCC): ICD-10-CM

## 2018-06-19 DIAGNOSIS — L30.9 HAND DERMATITIS: ICD-10-CM

## 2018-06-19 PROCEDURE — 99214 OFFICE O/P EST MOD 30 MIN: CPT | Performed by: FAMILY MEDICINE

## 2018-06-19 RX ORDER — BETAMETHASONE DIPROPIONATE 0.5 MG/G
OINTMENT TOPICAL 2 TIMES DAILY
Qty: 30 G | Refills: 1 | Status: SHIPPED | OUTPATIENT
Start: 2018-06-19 | End: 2019-01-19

## 2018-06-19 NOTE — ASSESSMENT & PLAN NOTE
Lab Results   Component Value Date    HGBA1C 5 5 11/06/2017     Patient has appoint with Endocrinology next month   No results for input(s): POCGLU in the last 72 hours      Blood Sugar Average: Last 72 hrs:

## 2018-06-19 NOTE — PROGRESS NOTES
Assessment/Plan:  1  Hand dermatitis, question etiology eczema versus vasculitis, betamethasone ointment prescribed patient wound only use betamethasone ointment on arms and legs do not use on face her genitals  2  Vasculitic type rash lower extremities, betamethasone ointment prescribed refer to Dermatology for confirmation treatment if needed  3  Thrombocytopenia, platelets [id number]  4  Type 2 diabetes patient is upcoming appointment next month with Endocrinology  5  Patient to return after Dermatology if needed    Hand dermatitis  Patient betamethasone ointment prescribed, Dermatology referral ordered    Vasculitis of skin  Betamethasone ointment prescribed, refer to Dermatology for confirmation and treatment    Controlled type 2 diabetes mellitus with microalbuminuria, without long-term current use of insulin ([facility] [address] )  Lab Results   Component Value Date    HGBA1C 5 5 11/06/2017     Patient has appoint with Endocrinology next month   No results for input(s): POCGLU in the last 72 hours  Blood Sugar Average: Last 72 hrs: Thrombocytopenia (HCC)  Platelet count [id number]       Diagnoses and all orders for this visit:    Vasculitis of skin  -     Ambulatory referral to Dermatology; Future  -     betamethasone, augmented, (DIPROLENE) 0 05 % ointment; Apply topically 2 (two) times a day    Hand dermatitis  -     Ambulatory referral to Dermatology; Future  -     betamethasone, augmented, (DIPROLENE) 0 05 % ointment; Apply topically 2 (two) times a day    Thrombocytopenia (HCC)    Controlled type 2 diabetes mellitus with microalbuminuria, without long-term current use of insulin ([facility])          Subjective: patient c/o ongoing left hand rash with skin peeling  Patient was here on 5/31/18  He states that whatever was given to him then has not helped  His hand is not itchy, or burning  ak     Patient ID: Kaci Sweeney is a 46 y o  male  Patient has had dry cracked hands off for the past few months    Patient was seen will 05/31/2018 for similar rash lower extremities thought to be vasculitis possibly worse by thrombocytopenia  Patient CBC on 05/01 shows platelets are 382,967  Patient has used ketoconazole cream as well as Lotrisone cream with very limited relief  Patient has not seen Dermatology as of yet  Patient does have an appointment next month with his endocrinologist and tells me his sugars are good        The following portions of the patient's history were reviewed and updated as appropriate: allergies, current medications, past family history, past medical history, past social history, past surgical history and problem list     Review of Systems   Constitutional: Negative  HENT: Negative  Eyes: Negative  Respiratory: Negative  Cardiovascular: Negative  Gastrointestinal: Negative  Endocrine:        HPI   Genitourinary: Negative  Musculoskeletal: Negative  Skin:        HPI   Allergic/Immunologic: Negative  Neurological: Negative  Hematological: Negative  Psychiatric/Behavioral: Negative  Objective:      /84   Pulse 68   Wt 97 4 kg (214 lb 12 8 oz)   BMI 29 13 kg/m²          Physical Exam   Constitutional: He is oriented to person, place, and time  He appears well-developed and well-nourished  HENT:   Head: Normocephalic and atraumatic  Mouth/Throat: Oropharynx is clear and moist    Eyes: EOM are normal  Pupils are equal, round, and reactive to light  No scleral icterus  Neck: Neck supple  Cardiovascular: Normal rate and regular rhythm  Pulmonary/Chest: Effort normal and breath sounds normal    Abdominal: Soft  Bowel sounds are normal  There is no tenderness  Musculoskeletal: He exhibits no edema  Neurological: He is oriented to person, place, and time  No cranial nerve deficit  Skin:   Very xerotic excoriated skin bilateral hands and bilateral lower extremities between mid calf and ankle    Question vasculitic type rash   Psychiatric: He has a normal mood and affect

## 2018-06-20 ENCOUNTER — TELEPHONE (OUTPATIENT)
Dept: FAMILY MEDICINE CLINIC | Facility: CLINIC | Age: 53
End: 2018-06-20

## 2018-06-20 NOTE — TELEPHONE ENCOUNTER
The Dermatologist that Dr Devan Cabrera is not taking new patients  He wanted to know who else Dr Devan Cabrera would recommend  He would like someone closer then [place]  He lives in [place]   Please call him at [phone]

## 2018-07-12 ENCOUNTER — OFFICE VISIT (OUTPATIENT)
Dept: FAMILY MEDICINE CLINIC | Facility: CLINIC | Age: 53
End: 2018-07-12
Payer: COMMERCIAL

## 2018-07-12 VITALS
HEART RATE: 72 BPM | WEIGHT: 215.6 LBS | DIASTOLIC BLOOD PRESSURE: 78 MMHG | BODY MASS INDEX: 29.24 KG/M2 | SYSTOLIC BLOOD PRESSURE: 130 MMHG

## 2018-07-12 DIAGNOSIS — M25.571 CHRONIC PAIN OF RIGHT ANKLE: Primary | ICD-10-CM

## 2018-07-12 DIAGNOSIS — G89.29 CHRONIC PAIN OF RIGHT ANKLE: Primary | ICD-10-CM

## 2018-07-12 PROCEDURE — 99214 OFFICE O/P EST MOD 30 MIN: CPT | Performed by: PHYSICIAN ASSISTANT

## 2018-07-12 RX ORDER — MELOXICAM 7.5 MG/1
7.5 TABLET ORAL DAILY
Qty: 30 TABLET | Refills: 0 | Status: SHIPPED | OUTPATIENT
Start: 2018-07-12 | End: 2019-01-19

## 2018-07-12 NOTE — PROGRESS NOTES
Assessment/Plan:    Chronic pain of right ankle  Check x-ray  Trial Mobic 7 5 mg once daily  Diagnoses and all orders for this visit:    Chronic pain of right ankle          Subjective:   CC: c/o right ankle pain  Pt  denies injury  Patient ID: Alfredo Juarez is a 46 y o  male  Patient here today with complaints of right ankle pain for months to years but getting worse  No history of injury except for when he was very much younger he fell off a ladder never got examined  He states he takes Motrin almost every day which does make it feel better  He is sure that he has arthritis in other areas of his body as well  The following portions of the patient's history were reviewed and updated as appropriate: allergies, current medications, past family history, past medical history, past social history, past surgical history and problem list     Review of Systems   Constitutional: Negative  HENT: Negative  Eyes: Negative  Respiratory: Negative  Cardiovascular: Negative  Gastrointestinal: Negative  Endocrine: Negative  Genitourinary: Negative  Musculoskeletal:        Right ankle pain   Skin: Negative  Allergic/Immunologic: Negative  Neurological: Negative  Hematological: Negative  Psychiatric/Behavioral: Negative  Objective:      Vitals:    07/12/18 1530   BP: 130/78   BP Location: Left arm   Patient Position: Sitting   Pulse: 72   Weight: 97 8 kg (215 lb 9 6 oz)            Physical Exam   Constitutional: He is oriented to person, place, and time  He appears well-developed and well-nourished  No distress  HENT:   Head: Normocephalic and atraumatic  Eyes: Conjunctivae are normal  Right eye exhibits no discharge  Left eye exhibits no discharge  Neck: Carotid bruit is not present  Cardiovascular: Normal rate, regular rhythm and normal heart sounds  Exam reveals no gallop and no friction rub  No murmur heard    Pulmonary/Chest: Effort normal and breath sounds normal  No respiratory distress  He has no wheezes  He has no rales  Musculoskeletal:   Full range of motion to right ankle mild edema no erythema or warmth  No crepitus  General trace edema pretibially with some stasis dermatitis skin changes  Neurological: He is alert and oriented to person, place, and time  Skin: Skin is warm and dry  He is not diaphoretic  Psychiatric: He has a normal mood and affect  Judgment normal    Nursing note and vitals reviewed

## 2018-07-12 NOTE — PATIENT INSTRUCTIONS
Problem List Items Addressed This Visit        Other    Chronic pain of right ankle - Primary     Check x-ray  Trial Mobic 7 5 mg once daily

## 2018-07-13 ENCOUNTER — TRANSCRIBE ORDERS (OUTPATIENT)
Dept: FAMILY MEDICINE CLINIC | Facility: CLINIC | Age: 53
End: 2018-07-13

## 2018-07-13 DIAGNOSIS — E11.29 TYPE 2 DIABETES MELLITUS WITH OTHER DIABETIC KIDNEY COMPLICATION (HCC): Primary | ICD-10-CM

## 2018-07-13 DIAGNOSIS — B18.2 CHRONIC VIRAL HEPATITIS C (HCC): ICD-10-CM

## 2018-07-17 ENCOUNTER — TRANSCRIBE ORDERS (OUTPATIENT)
Dept: ADMINISTRATIVE | Facility: HOSPITAL | Age: 53
End: 2018-07-17

## 2018-07-17 ENCOUNTER — APPOINTMENT (OUTPATIENT)
Dept: LAB | Facility: MEDICAL CENTER | Age: 53
End: 2018-07-17
Payer: COMMERCIAL

## 2018-07-17 ENCOUNTER — APPOINTMENT (OUTPATIENT)
Dept: RADIOLOGY | Facility: MEDICAL CENTER | Age: 53
End: 2018-07-17
Payer: COMMERCIAL

## 2018-07-17 DIAGNOSIS — E11.9 TYPE 2 DIABETES MELLITUS WITHOUT COMPLICATION, WITHOUT LONG-TERM CURRENT USE OF INSULIN (HCC): ICD-10-CM

## 2018-07-17 DIAGNOSIS — E11.9 TYPE 2 DIABETES MELLITUS WITHOUT COMPLICATION, WITHOUT LONG-TERM CURRENT USE OF INSULIN (HCC): Primary | ICD-10-CM

## 2018-07-17 DIAGNOSIS — G89.29 CHRONIC PAIN OF RIGHT ANKLE: ICD-10-CM

## 2018-07-17 DIAGNOSIS — M25.571 CHRONIC PAIN OF RIGHT ANKLE: ICD-10-CM

## 2018-07-17 LAB
EST. AVERAGE GLUCOSE BLD GHB EST-MCNC: 108 MG/DL
HBA1C MFR BLD: 5.4 % (ref 4.2–6.3)

## 2018-07-17 PROCEDURE — 73610 X-RAY EXAM OF ANKLE: CPT

## 2018-07-17 PROCEDURE — 36415 COLL VENOUS BLD VENIPUNCTURE: CPT

## 2018-07-17 PROCEDURE — 83036 HEMOGLOBIN GLYCOSYLATED A1C: CPT

## 2018-08-13 ENCOUNTER — TRANSCRIBE ORDERS (OUTPATIENT)
Dept: ADMINISTRATIVE | Facility: HOSPITAL | Age: 53
End: 2018-08-13

## 2018-08-13 ENCOUNTER — APPOINTMENT (OUTPATIENT)
Dept: LAB | Facility: MEDICAL CENTER | Age: 53
End: 2018-08-13
Payer: COMMERCIAL

## 2018-08-13 DIAGNOSIS — B18.2 CHRONIC HEPATITIS C WITH HEPATIC COMA (HCC): ICD-10-CM

## 2018-08-13 DIAGNOSIS — B18.2 CHRONIC HEPATITIS C WITH HEPATIC COMA (HCC): Primary | ICD-10-CM

## 2018-08-13 LAB
AFP-TM SERPL-MCNC: 4.4 NG/ML (ref 0.5–8)
ALBUMIN SERPL BCP-MCNC: 3.9 G/DL (ref 3.5–5)
ALP SERPL-CCNC: 66 U/L (ref 46–116)
ALT SERPL W P-5'-P-CCNC: 29 U/L (ref 12–78)
ANION GAP SERPL CALCULATED.3IONS-SCNC: 8 MMOL/L (ref 4–13)
AST SERPL W P-5'-P-CCNC: 20 U/L (ref 5–45)
BILIRUB SERPL-MCNC: 1.25 MG/DL (ref 0.2–1)
BUN SERPL-MCNC: 19 MG/DL (ref 5–25)
CALCIUM SERPL-MCNC: 8.6 MG/DL (ref 8.3–10.1)
CHLORIDE SERPL-SCNC: 105 MMOL/L (ref 100–108)
CO2 SERPL-SCNC: 27 MMOL/L (ref 21–32)
CREAT SERPL-MCNC: 0.96 MG/DL (ref 0.6–1.3)
ERYTHROCYTE [DISTWIDTH] IN BLOOD BY AUTOMATED COUNT: 13.5 % (ref 11.6–15.1)
GFR SERPL CREATININE-BSD FRML MDRD: 89 ML/MIN/1.73SQ M
GLUCOSE P FAST SERPL-MCNC: 154 MG/DL (ref 65–99)
HCT VFR BLD AUTO: 44.9 % (ref 36.5–49.3)
HGB BLD-MCNC: 15.2 G/DL (ref 12–17)
INR PPP: 1.14 (ref 0.86–1.17)
MCH RBC QN AUTO: 31.9 PG (ref 26.8–34.3)
MCHC RBC AUTO-ENTMCNC: 33.9 G/DL (ref 31.4–37.4)
MCV RBC AUTO: 94 FL (ref 82–98)
PLATELET # BLD AUTO: 98 THOUSANDS/UL (ref 149–390)
PMV BLD AUTO: 11.7 FL (ref 8.9–12.7)
POTASSIUM SERPL-SCNC: 3.7 MMOL/L (ref 3.5–5.3)
PROT SERPL-MCNC: 7.1 G/DL (ref 6.4–8.2)
PROTHROMBIN TIME: 14.7 SECONDS (ref 11.8–14.2)
RBC # BLD AUTO: 4.77 MILLION/UL (ref 3.88–5.62)
SODIUM SERPL-SCNC: 140 MMOL/L (ref 136–145)
WBC # BLD AUTO: 4.83 THOUSAND/UL (ref 4.31–10.16)

## 2018-08-13 PROCEDURE — 85610 PROTHROMBIN TIME: CPT

## 2018-08-13 PROCEDURE — 82105 ALPHA-FETOPROTEIN SERUM: CPT

## 2018-08-13 PROCEDURE — 80053 COMPREHEN METABOLIC PANEL: CPT

## 2018-08-13 PROCEDURE — 87522 HEPATITIS C REVRS TRNSCRPJ: CPT

## 2018-08-13 PROCEDURE — 85027 COMPLETE CBC AUTOMATED: CPT

## 2018-08-13 PROCEDURE — 36415 COLL VENOUS BLD VENIPUNCTURE: CPT

## 2018-08-15 LAB
HCV RNA SERPL NAA+PROBE-ACNC: NORMAL IU/ML
TEST INFORMATION: NORMAL

## 2018-09-11 ENCOUNTER — OFFICE VISIT (OUTPATIENT)
Dept: FAMILY MEDICINE CLINIC | Facility: CLINIC | Age: 53
End: 2018-09-11
Payer: COMMERCIAL

## 2018-09-11 VITALS
SYSTOLIC BLOOD PRESSURE: 134 MMHG | HEIGHT: 72 IN | TEMPERATURE: 97.8 F | HEART RATE: 92 BPM | WEIGHT: 209 LBS | BODY MASS INDEX: 28.31 KG/M2 | DIASTOLIC BLOOD PRESSURE: 70 MMHG

## 2018-09-11 DIAGNOSIS — A08.4 VIRAL GASTROENTERITIS: Primary | ICD-10-CM

## 2018-09-11 PROCEDURE — 99213 OFFICE O/P EST LOW 20 MIN: CPT | Performed by: FAMILY MEDICINE

## 2018-09-11 PROCEDURE — 1036F TOBACCO NON-USER: CPT | Performed by: FAMILY MEDICINE

## 2018-09-11 PROCEDURE — 3008F BODY MASS INDEX DOCD: CPT | Performed by: FAMILY MEDICINE

## 2018-09-11 NOTE — PROGRESS NOTES
Assessment/Plan:    No problem-specific Assessment & Plan notes found for this encounter  Diagnoses and all orders for this visit:    Viral gastroenteritis  Comments:  pepto Bismol, Immodium PRN  Fluids, rest  If lasts more than 5 days, culture stool  Subjective: c/o upset stomach and diarrhea for past few days  --bb     Patient ID: Rama Sandhoff is a 46 y o  male  Patient is here because he has had some upset stomach and diarrhea for the last several days  He has not tried any medications for it yet  No vomiting  Is just loose stools  Has been for a couple days  Reviewed his past medical problems  The following portions of the patient's history were reviewed and updated as appropriate: allergies, current medications, past family history, past medical history, past social history, past surgical history and problem list     Review of Systems   Constitutional:        Per HPI   Gastrointestinal: Positive for diarrhea and nausea  Negative for abdominal distention, abdominal pain, anal bleeding, blood in stool, constipation, rectal pain and vomiting  Objective:      /70 (BP Location: Left arm, Patient Position: Sitting, Cuff Size: Large)   Pulse 92   Temp 97 8 °F (36 6 °C) (Tympanic)   Ht 5' 11 5" (1 816 m)   Wt 94 8 kg (209 lb)   BMI 28 74 kg/m²          Physical Exam   Constitutional: He appears well-developed and well-nourished  Abdominal: Soft  Bowel sounds are normal  He exhibits no distension and no mass  There is no tenderness  There is no rebound and no guarding  Nursing note and vitals reviewed

## 2018-09-11 NOTE — PATIENT INSTRUCTIONS
Problem List Items Addressed This Visit     None      Visit Diagnoses     Viral gastroenteritis    -  Primary    pepto Bismol, Immodium PRN  Fluids, rest  If lasts more than 5 days, culture stool

## 2018-11-07 DIAGNOSIS — I48.21 PERMANENT ATRIAL FIBRILLATION (HCC): Primary | ICD-10-CM

## 2018-11-07 DIAGNOSIS — R80.9 MICROALBUMINURIA: Primary | ICD-10-CM

## 2018-11-07 DIAGNOSIS — E11.9 TYPE 2 DIABETES MELLITUS WITHOUT COMPLICATION, UNSPECIFIED WHETHER LONG TERM INSULIN USE (HCC): ICD-10-CM

## 2018-11-07 PROCEDURE — 4010F ACE/ARB THERAPY RXD/TAKEN: CPT | Performed by: FAMILY MEDICINE

## 2018-11-07 RX ORDER — LISINOPRIL 2.5 MG/1
TABLET ORAL
Qty: 90 TABLET | Refills: 3 | Status: SHIPPED | OUTPATIENT
Start: 2018-11-07 | End: 2019-01-19

## 2018-11-07 RX ORDER — DIGOXIN 250 UG/1
TABLET ORAL
Qty: 90 TABLET | Refills: 1 | Status: SHIPPED | OUTPATIENT
Start: 2018-11-07 | End: 2019-05-06 | Stop reason: SDUPTHER

## 2018-11-07 RX ORDER — OMEPRAZOLE 20 MG/1
CAPSULE, DELAYED RELEASE ORAL
Qty: 90 CAPSULE | Refills: 1 | Status: SHIPPED | OUTPATIENT
Start: 2018-11-07 | End: 2019-05-06 | Stop reason: SDUPTHER

## 2019-01-14 ENCOUNTER — TRANSCRIBE ORDERS (OUTPATIENT)
Dept: FAMILY MEDICINE CLINIC | Facility: CLINIC | Age: 54
End: 2019-01-14

## 2019-01-14 DIAGNOSIS — K74.60 CIRRHOSIS OF LIVER (HCC): Primary | ICD-10-CM

## 2019-01-19 ENCOUNTER — APPOINTMENT (EMERGENCY)
Dept: CT IMAGING | Facility: HOSPITAL | Age: 54
End: 2019-01-19
Payer: COMMERCIAL

## 2019-01-19 ENCOUNTER — HOSPITAL ENCOUNTER (EMERGENCY)
Facility: HOSPITAL | Age: 54
Discharge: HOME | End: 2019-01-19
Attending: EMERGENCY MEDICINE
Payer: COMMERCIAL

## 2019-01-19 VITALS
BODY MASS INDEX: 29.12 KG/M2 | HEART RATE: 89 BPM | OXYGEN SATURATION: 95 % | SYSTOLIC BLOOD PRESSURE: 164 MMHG | WEIGHT: 215 LBS | DIASTOLIC BLOOD PRESSURE: 78 MMHG | TEMPERATURE: 97.8 F | HEIGHT: 72 IN | RESPIRATION RATE: 13 BRPM

## 2019-01-19 DIAGNOSIS — G51.0 BELL'S PALSY: Primary | ICD-10-CM

## 2019-01-19 PROCEDURE — 93005 ELECTROCARDIOGRAM TRACING: CPT

## 2019-01-19 PROCEDURE — 70450 CT HEAD/BRAIN W/O DYE: CPT

## 2019-01-19 PROCEDURE — 99284 EMERGENCY DEPT VISIT MOD MDM: CPT

## 2019-01-19 RX ORDER — VALACYCLOVIR HYDROCHLORIDE 500 MG/1
1000 TABLET, FILM COATED ORAL ONCE
Status: COMPLETED | OUTPATIENT
Start: 2019-01-19 | End: 2019-01-19

## 2019-01-19 RX ORDER — PREDNISONE 20 MG/1
60 TABLET ORAL ONCE
Status: COMPLETED | OUTPATIENT
Start: 2019-01-19 | End: 2019-01-19

## 2019-01-19 RX ORDER — VALACYCLOVIR HYDROCHLORIDE 1 G/1
1000 TABLET, FILM COATED ORAL 3 TIMES DAILY
Qty: 18 TABLET | Refills: 0 | Status: SHIPPED | OUTPATIENT
Start: 2019-01-19 | End: 2020-07-15

## 2019-01-19 RX ORDER — PREDNISONE 20 MG/1
60 TABLET ORAL DAILY
Qty: 18 TABLET | Refills: 0 | Status: SHIPPED | OUTPATIENT
Start: 2019-01-19 | End: 2019-01-25

## 2019-01-19 RX ORDER — LISINOPRIL 2.5 MG/1
2.5 TABLET ORAL DAILY
COMMUNITY
End: 2019-04-15 | Stop reason: SDUPTHER

## 2019-01-19 RX ADMIN — PREDNISONE 60 MG: 20 TABLET ORAL at 19:43

## 2019-01-19 RX ADMIN — VALACYCLOVIR HYDROCHLORIDE 1000 MG: 500 TABLET, FILM COATED ORAL at 19:43

## 2019-01-20 NOTE — DISCHARGE INSTRUCTIONS
Take medications provided today as directed for the full 1 week of treatment  Return if he developed any numbness tingling or weakness any worsening symptoms or any other concerning symptoms  Follow up with the primary care doctor in 4-5 days for re-evaluation to ensure improvement of symptoms  Patch the eye and used drops as needed for dryness or irritation  House Palsy   WHAT YOU NEED TO KNOW:   Bell palsy is a sudden weakness or paralysis of one side of your face  Bell palsy occurs when the nerve that controls the muscles in your face becomes swollen or irritated  DISCHARGE INSTRUCTIONS:   Medicines:   · Medicine may be given  to decrease swelling and irritation of your facial nerve  You may receive antiviral medicine if your healthcare provider thinks a virus caused your Bell palsy  Your healthcare provider may also suggest acetaminophen or ibuprofen to reduce pain  These medicines are available without a doctor's order  Ask which medicine to take, and how much you need  Follow directions  Acetaminophen can cause liver damage, and ibuprofen can cause stomach bleeding or kidney damage  · Take your medicine as directed  Contact your healthcare provider if you think your medicine is not helping or if you have side effects  Tell him if you are allergic to any medicine  Keep a list of the medicines, vitamins, and herbs you take  Include the amounts, and when and why you take them  Bring the list or the pill bottles to follow-up visits  Carry your medicine list with you in case of an emergency  Follow up with your healthcare provider as directed:  Write down your questions so you remember to ask them during your visits  Eye care: Your healthcare provider may recommend that you use artificial tears during the day to keep your eye moist  You may need to use an eye ointment at night  You may also need to wear a patch over your eye and tape it shut while you sleep   This helps keep your eye from getting dry and infected  Wear sunglasses to protect your eye from direct sunlight  Stay away from places that have fumes, dust, or other particles in the air that may harm your eye  Physical therapy:  A physical therapist may teach you how to massage your face and exercise the nerves and muscles in your face  This may help you get better sooner and prevent long-term problems  You can exercise on your own when your facial movement begins to return  Open and close your eye, wink, and smile wide  Do the exercises for 15 or 20 minutes several times a day  Contact your healthcare provider if:   · You have a fever  · Your eye becomes red, irritated, or painful  · You have questions or concerns about your condition or care  Return to the emergency department if:   · You develop weakness or numbness on one side of your body (other than your face)  · You have double vision, or you lose vision in your eye  · You have trouble thinking clearly  © 2017 [employer]  Information is for End User's use only and may not be sold, redistributed or otherwise used for commercial purposes  All illustrations and images included in CareNotes® are the copyrighted property of [employer]  or [employer]  The above information is an  only  It is not intended as medical advice for individual conditions or treatments  Talk to your doctor, nurse or pharmacist before following any medical regimen to see if it is safe and effective for you

## 2019-01-20 NOTE — ED PROVIDER NOTES
History  Chief Complaint   Patient presents with    Facial Numbness     Pt reports that he began with facial numbness on Tuesday  Today pt noted that he is unable to close L eye   Facial Droop     Pt's significant other noted L mouth droop today  58-year-old male presents for left-sided facial droop  Symptoms began on Monday, 5 days ago  He reports he inability to close his left eye  He also describes a nonspecific facial numbness but does not described as lack of sensation in the needles or difference between the 2 sides  Describes it as a irritation but not being able to close his left eye  He denies any other focal numbness tingling or weakness  No visual field deficit diplopia difficulty swallowing or change in speech  No difficulty with balance or ambulating  Was able to complete a full day work today, was then seen by his spouse, reported his symptoms and she then recommended he come into the ED for evaluation  He has a history of AFib on aspirin, diabetes on metformin  No known vascular disease  No recent viral illness  Denies any rash, muffled hearing, ringing in the ears  No recent trauma or falls  Patient has a remote history of traumatic brain injury over 20 years ago  On exam patient has left-sided facial droop that involves the forehead  Inability to close the left eye  No subjective sensory deficits between the left or right side of the face  Remainder of neurologic exam nonfocal             Prior to Admission Medications   Prescriptions Last Dose Informant Patient Reported? Taking?    [address] [facility] 250 MCG tablet   No Yes   Sig: TAKE 1 TABLET DAILY   aspirin 81 MG tablet  Self Yes Yes   Sig: Take by mouth   lisinopril (ZESTRIL) 2 5 mg tablet   Yes Yes   Sig: Take 2 5 mg by mouth daily   metFORMIN (GLUCOPHAGE) 500 mg tablet   No Yes   Sig: TAKE 1 TABLET TWICE A DAY   omeprazole (PriLOSEC) 20 mg delayed release capsule   No Yes   Sig: TAKE 1 CAPSULE DAILY      Facility-Administered Medications: None       Past Medical History:   Diagnosis Date    [facility]fib [facility] [address] )     Diabetes mellitus ([facility] [address] )        Past Surgical History:   Procedure Laterality Date    ROLAN HOLE FOR SUBDURAL HEMATOMA      COLONOSCOPY N/A 2/22/2016    Procedure: COLONOSCOPY;  Surgeon: Kianna Arrieta MD;  Location: [facility]; Service:     INGUINAL HERNIA REPAIR      SHOULDER SURGERY Left        Family History   Problem Relation Age of Onset    Atrial fibrillation Mother     Hypertension Mother      I have reviewed and agree with the history as documented  Social History   Substance Use Topics    Smoking status: Former Smoker     Packs/day: 0 20     Quit date: 8/11/2018    Smokeless tobacco: Former User      Comment: EXPOSURE TO SECOND HAND SMOKE  ALL SCRIPTS ALSO SAYS FORMER SMOKER    Alcohol use No      Comment: ALL SCRIPITS SAYS SOCIAL ALCOHOL USE        Review of Systems   Constitutional: Negative for chills, fatigue and fever  HENT: Negative for congestion and sore throat  Eyes: Negative for visual disturbance  Respiratory: Negative for cough, chest tightness, shortness of breath and wheezing  Cardiovascular: Negative for chest pain, palpitations and leg swelling  Gastrointestinal: Negative for abdominal pain, blood in stool, diarrhea, nausea and vomiting  Genitourinary: Negative for difficulty urinating, dysuria and hematuria  Musculoskeletal: Negative for gait problem  Skin: Negative for rash  Allergic/Immunologic: Negative for immunocompromised state  Neurological: Positive for facial asymmetry and weakness  Negative for dizziness, tremors, seizures, syncope, light-headedness, numbness and headaches  Hematological: Negative for adenopathy  Psychiatric/Behavioral: Negative for sleep disturbance         Physical Exam  ED Triage Vitals   Temperature Pulse Respirations Blood Pressure SpO2   01/19/19 1836 01/19/19 1836 01/19/19 1836 01/19/19 1836 01/19/19 1836   97 8 °F (36 6 °C) 103 18 (!) 177/95 98 %      Temp Source Heart Rate Source Patient Position - Orthostatic VS BP Location FiO2 (%)   01/19/19 1836 01/19/19 1947 01/19/19 1836 01/19/19 1836 --   Oral Monitor Sitting Right arm       Pain Score       01/19/19 1836       No Pain           Orthostatic Vital Signs  Vitals:    01/19/19 1836 01/19/19 1947   BP: (!) 177/95 164/78   Pulse: 103 89   Patient Position - Orthostatic VS: Sitting Lying       Physical Exam   Constitutional: He is oriented to person, place, and time  He appears well-developed and well-nourished  HENT:   Head: Normocephalic and atraumatic  Eyes: Pupils are equal, round, and reactive to light  Conjunctivae and EOM are normal    Neck: Normal range of motion  Neck supple  No JVD present  Cardiovascular: Normal rate and regular rhythm  No murmur heard  Pulmonary/Chest: Effort normal and breath sounds normal  He has no wheezes  He has no rales  Abdominal: Soft  Bowel sounds are normal  He exhibits no distension  There is no tenderness  Musculoskeletal: He exhibits no edema  Lymphadenopathy:     He has no cervical adenopathy  Neurological: He is alert and oriented to person, place, and time  He has normal strength  He is not disoriented  A cranial nerve deficit is present  No sensory deficit  He exhibits normal muscle tone  He displays a negative Romberg sign  Gait normal  GCS eye subscore is 4  GCS verbal subscore is 5  GCS motor subscore is 6  L sided facial droop involving the forehead  Inability to close L eye, otherside Unremarkable cranial nerve exam  Pupils 4 mm equal round reactive to light  No nystagmus, normal extraocular motion  5 out of 5 upper and lower extremity strength  No subjective sensory deficits to the face, upper or lower extremity  Normal finger-nose and heel shin  No dysarthria  No pronator drift  Skin: Skin is warm  No rash noted  He is not diaphoretic  Psychiatric: He has a normal mood and affect  Vitals reviewed        ED Medications  Medications   predniSONE tablet 60 mg (60 mg Oral Given 1/19/19 1943)   valACYclovir (VALTREX) tablet 1,000 mg (1,000 mg Oral Given 1/19/19 1943)       Diagnostic Studies  Results Reviewed     None                 CT head without contrast   Final Result by Lucita Herman MD (01/19 1935)      No acute intracranial abnormality  Workstation performed: [id number]               Procedures  Procedures      Phone Consults  ED Phone Contact    ED Course  ED Course as of Jan 19 2136   Sat Jan 19, 2019 1956 Patient provided return precautions as well as recommendation patient for follow-up primary care doctor in 3-4 days as well as with ENT to monitor for resolution of symptoms  Patient verbalized understanding and agrees with plan of care  MDM  CritCare Time    Disposition  Final diagnoses:   Bell's palsy     Time reflects when diagnosis was documented in both MDM as applicable and the Disposition within this note     Time User Action Codes Description Comment    1/19/2019  7:32 PM Paula Hall Add [G51 0] Bell's palsy       ED Disposition     ED Disposition Condition Comment    Discharge  [address] discharge to home/self care  Condition at discharge: Good        Follow-up Information     Follow up With Specialties Details Why Contact Info Additional [address] [facility] Emergency Department Emergency Medicine Go to If symptoms worsen [address] [facility]  [phone] [place] [facility], [address] Arturo Valentine MD Family Medicine Schedule an appointment as soon as possible for a visit in 1 week For re-check [address]  [address] [facility] [address] [facility]       Claude Calkins, MD Otolaryngology Schedule an appointment as soon as possible for a visit in 1 week For re-check [address]    [address] [facility] [address]             Discharge Medication List as of 1/19/2019  7:53 PM      START taking these medications    Details   Polyethylene Glycol 400 (BLINK TEARS) 0 25 % SOLN Apply 1 drop to eye every 4 (four) hours, Starting Sat 1/19/2019, Print      predniSONE 20 mg tablet Take 3 tablets (60 mg total) by mouth daily for 6 days, Starting Sat 1/19/2019, Until Fri 1/25/2019, Print      valACYclovir (VALTREX) 1,000 mg tablet Take 1 tablet (1,000 mg total) by mouth 3 (three) times a day for 6 days, Starting Sat 1/19/2019, Until Fri 1/25/2019, Print         CONTINUE these medications which have NOT CHANGED    Details   aspirin 81 MG tablet Take by mouth, Starting Mon 1/21/2013, Historical Med      DIGOX 250 MCG tablet TAKE 1 TABLET DAILY, Normal      lisinopril (ZESTRIL) 2 5 mg tablet Take 2 5 mg by mouth daily, Historical Med      metFORMIN (GLUCOPHAGE) 500 mg tablet TAKE 1 TABLET TWICE A DAY, Normal      omeprazole (PriLOSEC) 20 mg delayed release capsule TAKE 1 CAPSULE DAILY, Normal           No discharge procedures on file  ED Provider  Attending physically available and evaluated Vishnu Lea  MANDEEP managed the patient along with the ED Attending      Electronically Signed by         Mario Chaves DO  01/19/19 5182

## 2019-01-20 NOTE — ED ATTENDING ATTESTATION
Kina Norris DO, saw and evaluated the patient  I have discussed the patient with the resident/non-physician practitioner and agree with the resident's/non-physician practitioner's findings, Plan of Care, and MDM as documented in the resident's/non-physician practitioner's note, except where noted  All available labs and Radiology studies were reviewed  At this point I agree with the current assessment done in the Emergency Department  I have conducted an independent evaluation of this patient a history and physical is as follows:      Critical Care Time  CritCare Time    Procedures   59-year-old male presents with a 6 day history of left-sided facial droop and numbness  No visual or speech deficits  No focal neuro deficits  No headaches  No preceding URI symptoms  On exam he is awake alert oriented x3 with normal speech  Pupils are equal and reactive to light  Extraocular muscles are intact  He does have a left facial droop without sparing of the forehead  His tongue is midline  He has no focal deficits on exam   Heart is regular without murmur  Lungs are clear  Abdomen is soft and nontender  Skin is warm and dry without rash    This is consistent with Bell's palsy

## 2019-01-21 LAB
QRS AXIS: 44 DEGREES
QRSD INTERVAL: 100 MS
QT INTERVAL: 332 MS
QTC INTERVAL: 419 MS
T WAVE AXIS: 52 DEGREES
VENTRICULAR RATE: 96 BPM

## 2019-01-21 PROCEDURE — 93010 ELECTROCARDIOGRAM REPORT: CPT | Performed by: INTERNAL MEDICINE

## 2019-02-07 ENCOUNTER — TRANSCRIBE ORDERS (OUTPATIENT)
Dept: FAMILY MEDICINE CLINIC | Facility: CLINIC | Age: 54
End: 2019-02-07

## 2019-02-07 DIAGNOSIS — L95.9 VASCULITIS LIMITED TO SKIN: Primary | ICD-10-CM

## 2019-02-07 DIAGNOSIS — L30.9 DERMATITIS: ICD-10-CM

## 2019-02-13 ENCOUNTER — APPOINTMENT (OUTPATIENT)
Dept: LAB | Facility: MEDICAL CENTER | Age: 54
End: 2019-02-13
Payer: COMMERCIAL

## 2019-02-13 ENCOUNTER — TRANSCRIBE ORDERS (OUTPATIENT)
Dept: ADMINISTRATIVE | Facility: HOSPITAL | Age: 54
End: 2019-02-13

## 2019-02-13 ENCOUNTER — PROCEDURE VISIT (OUTPATIENT)
Dept: FAMILY MEDICINE CLINIC | Facility: CLINIC | Age: 54
End: 2019-02-13
Payer: COMMERCIAL

## 2019-02-13 VITALS
HEIGHT: 72 IN | WEIGHT: 220.6 LBS | HEART RATE: 68 BPM | SYSTOLIC BLOOD PRESSURE: 126 MMHG | DIASTOLIC BLOOD PRESSURE: 70 MMHG | BODY MASS INDEX: 29.88 KG/M2

## 2019-02-13 DIAGNOSIS — H61.23 BILATERAL IMPACTED CERUMEN: Primary | ICD-10-CM

## 2019-02-13 DIAGNOSIS — K74.60 CIRRHOSIS OF LIVER WITHOUT ASCITES, UNSPECIFIED HEPATIC CIRRHOSIS TYPE (HCC): ICD-10-CM

## 2019-02-13 DIAGNOSIS — K74.60 CIRRHOSIS OF LIVER WITHOUT ASCITES, UNSPECIFIED HEPATIC CIRRHOSIS TYPE (HCC): Primary | ICD-10-CM

## 2019-02-13 LAB
AFP-TM SERPL-MCNC: 4.5 NG/ML (ref 0.5–8)
ALBUMIN SERPL BCP-MCNC: 4.1 G/DL (ref 3.5–5)
ALP SERPL-CCNC: 69 U/L (ref 46–116)
ALT SERPL W P-5'-P-CCNC: 33 U/L (ref 12–78)
ANION GAP SERPL CALCULATED.3IONS-SCNC: 5 MMOL/L (ref 4–13)
AST SERPL W P-5'-P-CCNC: 20 U/L (ref 5–45)
BILIRUB SERPL-MCNC: 0.95 MG/DL (ref 0.2–1)
BUN SERPL-MCNC: 14 MG/DL (ref 5–25)
CALCIUM SERPL-MCNC: 8.6 MG/DL (ref 8.3–10.1)
CHLORIDE SERPL-SCNC: 105 MMOL/L (ref 100–108)
CO2 SERPL-SCNC: 30 MMOL/L (ref 21–32)
CREAT SERPL-MCNC: 0.93 MG/DL (ref 0.6–1.3)
ERYTHROCYTE [DISTWIDTH] IN BLOOD BY AUTOMATED COUNT: 14.5 % (ref 11.6–15.1)
GFR SERPL CREATININE-BSD FRML MDRD: 93 ML/MIN/1.73SQ M
GLUCOSE P FAST SERPL-MCNC: 124 MG/DL (ref 65–99)
HCT VFR BLD AUTO: 45.4 % (ref 36.5–49.3)
HGB BLD-MCNC: 14.9 G/DL (ref 12–17)
INR PPP: 1.13 (ref 0.86–1.17)
MCH RBC QN AUTO: 30.7 PG (ref 26.8–34.3)
MCHC RBC AUTO-ENTMCNC: 32.8 G/DL (ref 31.4–37.4)
MCV RBC AUTO: 93 FL (ref 82–98)
PLATELET # BLD AUTO: 111 THOUSANDS/UL (ref 149–390)
PMV BLD AUTO: 11 FL (ref 8.9–12.7)
POTASSIUM SERPL-SCNC: 4.1 MMOL/L (ref 3.5–5.3)
PROT SERPL-MCNC: 7 G/DL (ref 6.4–8.2)
PROTHROMBIN TIME: 14.6 SECONDS (ref 11.8–14.2)
RBC # BLD AUTO: 4.86 MILLION/UL (ref 3.88–5.62)
SODIUM SERPL-SCNC: 140 MMOL/L (ref 136–145)
WBC # BLD AUTO: 5.07 THOUSAND/UL (ref 4.31–10.16)

## 2019-02-13 PROCEDURE — 87521 HEPATITIS C PROBE&RVRS TRNSC: CPT

## 2019-02-13 PROCEDURE — 82105 ALPHA-FETOPROTEIN SERUM: CPT

## 2019-02-13 PROCEDURE — 85027 COMPLETE CBC AUTOMATED: CPT

## 2019-02-13 PROCEDURE — 85610 PROTHROMBIN TIME: CPT

## 2019-02-13 PROCEDURE — 99213 OFFICE O/P EST LOW 20 MIN: CPT | Performed by: PHYSICIAN ASSISTANT

## 2019-02-13 PROCEDURE — 69210 REMOVE IMPACTED EAR WAX UNI: CPT | Performed by: PHYSICIAN ASSISTANT

## 2019-02-13 PROCEDURE — 36415 COLL VENOUS BLD VENIPUNCTURE: CPT

## 2019-02-13 PROCEDURE — 80053 COMPREHEN METABOLIC PANEL: CPT

## 2019-02-13 NOTE — PROGRESS NOTES
Assessment and Plan:  Patient Instructions   1  Cerumen impaction-lavage bilaterally in office with success  See procedure note  Problem List Items Addressed This Visit     None      Visit Diagnoses     Bilateral impacted cerumen    -  Primary    Relevant Orders    Ear cerumen removal             Diagnoses and all orders for this visit:    Bilateral impacted cerumen  -     Ear cerumen removal              Subjective:      Patient ID: Andrea Estes is a 46 y o  male  CC:    Chief Complaint   Patient presents with    Cerumen Impaction     Pt states he went for a Hearing test at work and they told him he has wax in the ears  HPI:    HPI:  This is a 69-year-old gentleman that presents to the office with concerns over possible wax in his ears  He was for a hearing test at work and was told that he had significant wax buildup  He did passed the test   He has not had any itching or discomfort in the ears  He has not had any vertigo  He does wear earplugs for work on a regular basis  The following portions of the patient's history were reviewed and updated as appropriate: allergies, current medications, past family history, past medical history, past social history, past surgical history and problem list       Review of Systems   Constitutional: Negative for fever  HENT: Negative for congestion  Respiratory: Negative for cough, chest tightness and shortness of breath  Cardiovascular: Negative for chest pain  Musculoskeletal: Negative for arthralgias  Data to review:       Objective:    Vitals:    02/13/19 1815   BP: 126/70   BP Location: Left arm   Pulse: 68   Weight: 100 kg (220 lb 9 6 oz)   Height: 6' (1 829 m)          Physical Exam   Constitutional: He is oriented to person, place, and time  He appears well-developed and well-nourished  HENT:   Head: Normocephalic  Bilateral tympanic membranes obscured by cerumen  Cardiovascular: Normal rate and regular rhythm  Pulmonary/Chest: Effort normal and breath sounds normal  No respiratory distress  Abdominal: Soft  Musculoskeletal: Normal range of motion  Neurological: He is alert and oriented to person, place, and time  Psychiatric: He has a normal mood and affect  Nursing note and vitals reviewed  Ear cerumen removal  Date/Time: 2/13/2019 6:31 PM  Performed by: Inocencia Barahona PA-C  Authorized by: Inocencia Barahona PA-C     Patient location:  Clinic  Other Assisting Provider: No    Consent:     Consent obtained:  Verbal    Consent given by:  Patient    Risks discussed:  Bleeding, dizziness, infection, incomplete removal, pain and TM perforation    Alternatives discussed:  No treatment, delayed treatment, alternative treatment, observation and referral  Universal protocol:     Procedure explained and questions answered to patient or proxy's satisfaction: yes      Immediately prior to procedure a time out was called: yes      Patient identity confirmed:  Verbally with patient  Procedure details:     Local anesthetic:  None    Location:  L ear and R ear    Approach:  External  Post-procedure details:     Complication:  None    Hearing quality:  Improved    Patient tolerance of procedure:   Tolerated well, no immediate complications

## 2019-02-15 LAB — HCV RNA SERPL QL NAA+PROBE: NEGATIVE

## 2019-04-12 ENCOUNTER — TRANSITIONAL CARE MANAGEMENT (OUTPATIENT)
Dept: FAMILY MEDICINE CLINIC | Facility: CLINIC | Age: 54
End: 2019-04-12

## 2019-04-15 ENCOUNTER — OFFICE VISIT (OUTPATIENT)
Dept: FAMILY MEDICINE CLINIC | Facility: CLINIC | Age: 54
End: 2019-04-15
Payer: COMMERCIAL

## 2019-04-15 VITALS
WEIGHT: 213.6 LBS | SYSTOLIC BLOOD PRESSURE: 140 MMHG | BODY MASS INDEX: 28.93 KG/M2 | HEART RATE: 80 BPM | DIASTOLIC BLOOD PRESSURE: 80 MMHG | HEIGHT: 72 IN

## 2019-04-15 DIAGNOSIS — E11.29 CONTROLLED TYPE 2 DIABETES MELLITUS WITH MICROALBUMINURIA, WITHOUT LONG-TERM CURRENT USE OF INSULIN (HCC): ICD-10-CM

## 2019-04-15 DIAGNOSIS — I48.21 PERMANENT ATRIAL FIBRILLATION (HCC): ICD-10-CM

## 2019-04-15 DIAGNOSIS — I63.519 ACUTE ISCHEMIC CEREBROVASCULAR ACCIDENT (CVA) INVOLVING MIDDLE CEREBRAL ARTERY TERRITORY (HCC): Primary | ICD-10-CM

## 2019-04-15 DIAGNOSIS — R80.9 MICROALBUMINURIA: ICD-10-CM

## 2019-04-15 DIAGNOSIS — E11.9 TYPE 2 DIABETES MELLITUS WITHOUT COMPLICATION, UNSPECIFIED WHETHER LONG TERM INSULIN USE (HCC): ICD-10-CM

## 2019-04-15 DIAGNOSIS — R80.9 CONTROLLED TYPE 2 DIABETES MELLITUS WITH MICROALBUMINURIA, WITHOUT LONG-TERM CURRENT USE OF INSULIN (HCC): ICD-10-CM

## 2019-04-15 PROCEDURE — 99496 TRANSJ CARE MGMT HIGH F2F 7D: CPT | Performed by: PHYSICIAN ASSISTANT

## 2019-04-15 RX ORDER — ATORVASTATIN CALCIUM 40 MG/1
40 TABLET, FILM COATED ORAL DAILY
Qty: 30 TABLET | Refills: 5 | Status: SHIPPED | OUTPATIENT
Start: 2019-04-15 | End: 2019-05-20

## 2019-04-15 RX ORDER — LISINOPRIL 2.5 MG/1
2.5 TABLET ORAL DAILY
Qty: 30 TABLET | Refills: 5 | Status: SHIPPED | OUTPATIENT
Start: 2019-04-15 | End: 2019-11-03 | Stop reason: SDUPTHER

## 2019-05-06 DIAGNOSIS — E11.9 TYPE 2 DIABETES MELLITUS WITHOUT COMPLICATION, UNSPECIFIED WHETHER LONG TERM INSULIN USE (HCC): ICD-10-CM

## 2019-05-06 DIAGNOSIS — I48.21 PERMANENT ATRIAL FIBRILLATION (HCC): ICD-10-CM

## 2019-05-06 RX ORDER — OMEPRAZOLE 20 MG/1
CAPSULE, DELAYED RELEASE ORAL
Qty: 90 CAPSULE | Refills: 1 | Status: SHIPPED | OUTPATIENT
Start: 2019-05-06 | End: 2019-11-02 | Stop reason: SDUPTHER

## 2019-05-07 RX ORDER — DIGOXIN 250 UG/1
TABLET ORAL
Qty: 90 TABLET | Refills: 1 | Status: SHIPPED | OUTPATIENT
Start: 2019-05-07 | End: 2019-10-14 | Stop reason: SDUPTHER

## 2019-05-09 ENCOUNTER — TRANSCRIBE ORDERS (OUTPATIENT)
Dept: FAMILY MEDICINE CLINIC | Facility: CLINIC | Age: 54
End: 2019-05-09

## 2019-05-09 DIAGNOSIS — I48.20 CHRONIC ATRIAL FIBRILLATION (HCC): Primary | ICD-10-CM

## 2019-05-20 ENCOUNTER — OFFICE VISIT (OUTPATIENT)
Dept: CARDIOLOGY CLINIC | Facility: CLINIC | Age: 54
End: 2019-05-20
Payer: COMMERCIAL

## 2019-05-20 VITALS
HEART RATE: 67 BPM | DIASTOLIC BLOOD PRESSURE: 80 MMHG | HEIGHT: 72 IN | BODY MASS INDEX: 29.39 KG/M2 | SYSTOLIC BLOOD PRESSURE: 140 MMHG | OXYGEN SATURATION: 99 % | WEIGHT: 217 LBS

## 2019-05-20 DIAGNOSIS — I48.20 CHRONIC ATRIAL FIBRILLATION (HCC): Primary | ICD-10-CM

## 2019-05-20 DIAGNOSIS — R03.0 BORDERLINE HYPERTENSION: ICD-10-CM

## 2019-05-20 DIAGNOSIS — I74.9 TIA DUE TO EMBOLISM (HCC): ICD-10-CM

## 2019-05-20 DIAGNOSIS — I42.9 CARDIOMYOPATHY, UNSPECIFIED TYPE (HCC): ICD-10-CM

## 2019-05-20 DIAGNOSIS — G45.9 TIA DUE TO EMBOLISM (HCC): ICD-10-CM

## 2019-05-20 PROCEDURE — 99214 OFFICE O/P EST MOD 30 MIN: CPT | Performed by: INTERNAL MEDICINE

## 2019-06-10 DIAGNOSIS — I48.91 ATRIAL FIBRILLATION, UNSPECIFIED TYPE (HCC): Primary | ICD-10-CM

## 2019-06-25 ENCOUNTER — TRANSCRIBE ORDERS (OUTPATIENT)
Dept: FAMILY MEDICINE CLINIC | Facility: CLINIC | Age: 54
End: 2019-06-25

## 2019-06-25 DIAGNOSIS — I63.519: Primary | ICD-10-CM

## 2019-07-10 ENCOUNTER — TRANSCRIBE ORDERS (OUTPATIENT)
Dept: FAMILY MEDICINE CLINIC | Facility: CLINIC | Age: 54
End: 2019-07-10

## 2019-07-10 DIAGNOSIS — K74.60 CIRRHOSIS OF LIVER (HCC): Primary | ICD-10-CM

## 2019-07-19 ENCOUNTER — TRANSCRIBE ORDERS (OUTPATIENT)
Dept: FAMILY MEDICINE CLINIC | Facility: CLINIC | Age: 54
End: 2019-07-19

## 2019-07-19 DIAGNOSIS — E11.29 TYPE 2 DIABETES MELLITUS WITH OTHER DIABETIC KIDNEY COMPLICATION (HCC): Primary | ICD-10-CM

## 2019-07-22 ENCOUNTER — APPOINTMENT (OUTPATIENT)
Dept: LAB | Facility: MEDICAL CENTER | Age: 54
End: 2019-07-22
Payer: COMMERCIAL

## 2019-07-22 ENCOUNTER — TRANSCRIBE ORDERS (OUTPATIENT)
Dept: ADMINISTRATIVE | Facility: HOSPITAL | Age: 54
End: 2019-07-22

## 2019-07-22 DIAGNOSIS — I63.412 CEREBRAL INFARCTION DUE TO EMBOLISM OF LEFT MIDDLE CEREBRAL ARTERY (HCC): Primary | ICD-10-CM

## 2019-07-22 DIAGNOSIS — I63.412 CEREBRAL INFARCTION DUE TO EMBOLISM OF LEFT MIDDLE CEREBRAL ARTERY (HCC): ICD-10-CM

## 2019-07-22 LAB
ALBUMIN SERPL BCP-MCNC: 4 G/DL (ref 3.5–5)
ALP SERPL-CCNC: 70 U/L (ref 46–116)
ALT SERPL W P-5'-P-CCNC: 32 U/L (ref 12–78)
ANION GAP SERPL CALCULATED.3IONS-SCNC: 5 MMOL/L (ref 4–13)
AST SERPL W P-5'-P-CCNC: 19 U/L (ref 5–45)
BILIRUB SERPL-MCNC: 1.1 MG/DL (ref 0.2–1)
BUN SERPL-MCNC: 18 MG/DL (ref 5–25)
CALCIUM SERPL-MCNC: 8.5 MG/DL (ref 8.3–10.1)
CHLORIDE SERPL-SCNC: 104 MMOL/L (ref 100–108)
CO2 SERPL-SCNC: 30 MMOL/L (ref 21–32)
CREAT SERPL-MCNC: 0.92 MG/DL (ref 0.6–1.3)
GFR SERPL CREATININE-BSD FRML MDRD: 93 ML/MIN/1.73SQ M
GLUCOSE P FAST SERPL-MCNC: 150 MG/DL (ref 65–99)
POTASSIUM SERPL-SCNC: 3.5 MMOL/L (ref 3.5–5.3)
PROT SERPL-MCNC: 7 G/DL (ref 6.4–8.2)
SODIUM SERPL-SCNC: 139 MMOL/L (ref 136–145)

## 2019-07-22 PROCEDURE — 36415 COLL VENOUS BLD VENIPUNCTURE: CPT

## 2019-07-22 PROCEDURE — 80053 COMPREHEN METABOLIC PANEL: CPT

## 2019-07-30 LAB — HBA1C MFR BLD HPLC: 6 %

## 2019-08-29 DIAGNOSIS — I48.91 ATRIAL FIBRILLATION, UNSPECIFIED TYPE (HCC): ICD-10-CM

## 2019-08-29 DIAGNOSIS — I48.20 CHRONIC ATRIAL FIBRILLATION (HCC): Primary | ICD-10-CM

## 2019-09-29 LAB
LEFT EYE DIABETIC RETINOPATHY: NORMAL
RIGHT EYE DIABETIC RETINOPATHY: NORMAL

## 2019-10-05 DIAGNOSIS — E11.29 CONTROLLED TYPE 2 DIABETES MELLITUS WITH MICROALBUMINURIA, WITHOUT LONG-TERM CURRENT USE OF INSULIN (HCC): ICD-10-CM

## 2019-10-05 DIAGNOSIS — I48.21 PERMANENT ATRIAL FIBRILLATION (HCC): ICD-10-CM

## 2019-10-05 DIAGNOSIS — E11.9 TYPE 2 DIABETES MELLITUS WITHOUT COMPLICATION, UNSPECIFIED WHETHER LONG TERM INSULIN USE (HCC): ICD-10-CM

## 2019-10-05 DIAGNOSIS — R80.9 CONTROLLED TYPE 2 DIABETES MELLITUS WITH MICROALBUMINURIA, WITHOUT LONG-TERM CURRENT USE OF INSULIN (HCC): ICD-10-CM

## 2019-10-05 DIAGNOSIS — I63.519 ACUTE ISCHEMIC CEREBROVASCULAR ACCIDENT (CVA) INVOLVING MIDDLE CEREBRAL ARTERY TERRITORY (HCC): ICD-10-CM

## 2019-10-06 RX ORDER — ATORVASTATIN CALCIUM 40 MG/1
TABLET, FILM COATED ORAL
Qty: 30 TABLET | Refills: 5 | Status: SHIPPED | OUTPATIENT
Start: 2019-10-06 | End: 2020-01-27

## 2019-10-07 DIAGNOSIS — E11.9 TYPE 2 DIABETES MELLITUS WITHOUT COMPLICATION, UNSPECIFIED WHETHER LONG TERM INSULIN USE (HCC): ICD-10-CM

## 2019-10-14 DIAGNOSIS — I48.21 PERMANENT ATRIAL FIBRILLATION (HCC): ICD-10-CM

## 2019-10-14 RX ORDER — DIGOXIN 250 MCG
250 TABLET ORAL DAILY
Qty: 90 TABLET | Refills: 1 | Status: SHIPPED | OUTPATIENT
Start: 2019-10-14 | End: 2019-11-03 | Stop reason: SDUPTHER

## 2019-10-30 DIAGNOSIS — I63.519 ACUTE ISCHEMIC CEREBROVASCULAR ACCIDENT (CVA) INVOLVING MIDDLE CEREBRAL ARTERY TERRITORY (HCC): ICD-10-CM

## 2019-10-30 DIAGNOSIS — E11.29 CONTROLLED TYPE 2 DIABETES MELLITUS WITH MICROALBUMINURIA, WITHOUT LONG-TERM CURRENT USE OF INSULIN (HCC): ICD-10-CM

## 2019-10-30 DIAGNOSIS — E11.9 TYPE 2 DIABETES MELLITUS WITHOUT COMPLICATION, UNSPECIFIED WHETHER LONG TERM INSULIN USE (HCC): ICD-10-CM

## 2019-10-30 DIAGNOSIS — I48.21 PERMANENT ATRIAL FIBRILLATION (HCC): ICD-10-CM

## 2019-10-30 DIAGNOSIS — R80.9 CONTROLLED TYPE 2 DIABETES MELLITUS WITH MICROALBUMINURIA, WITHOUT LONG-TERM CURRENT USE OF INSULIN (HCC): ICD-10-CM

## 2019-11-02 DIAGNOSIS — E11.9 TYPE 2 DIABETES MELLITUS WITHOUT COMPLICATION, UNSPECIFIED WHETHER LONG TERM INSULIN USE (HCC): ICD-10-CM

## 2019-11-03 DIAGNOSIS — R80.9 MICROALBUMINURIA: ICD-10-CM

## 2019-11-03 DIAGNOSIS — I63.519 ACUTE ISCHEMIC CEREBROVASCULAR ACCIDENT (CVA) INVOLVING MIDDLE CEREBRAL ARTERY TERRITORY (HCC): ICD-10-CM

## 2019-11-03 DIAGNOSIS — E11.29 CONTROLLED TYPE 2 DIABETES MELLITUS WITH MICROALBUMINURIA, WITHOUT LONG-TERM CURRENT USE OF INSULIN (HCC): ICD-10-CM

## 2019-11-03 DIAGNOSIS — R80.9 CONTROLLED TYPE 2 DIABETES MELLITUS WITH MICROALBUMINURIA, WITHOUT LONG-TERM CURRENT USE OF INSULIN (HCC): ICD-10-CM

## 2019-11-03 DIAGNOSIS — I48.21 PERMANENT ATRIAL FIBRILLATION (HCC): ICD-10-CM

## 2019-11-03 DIAGNOSIS — E11.9 TYPE 2 DIABETES MELLITUS WITHOUT COMPLICATION, UNSPECIFIED WHETHER LONG TERM INSULIN USE (HCC): ICD-10-CM

## 2019-11-03 RX ORDER — DIGOXIN 250 MCG
TABLET ORAL
Qty: 90 TABLET | Refills: 3 | Status: SHIPPED | OUTPATIENT
Start: 2019-11-03 | End: 2020-04-06

## 2019-11-04 RX ORDER — LISINOPRIL 2.5 MG/1
TABLET ORAL
Qty: 90 TABLET | Refills: 4 | Status: SHIPPED | OUTPATIENT
Start: 2019-11-04 | End: 2021-01-27

## 2019-11-04 RX ORDER — OMEPRAZOLE 20 MG/1
CAPSULE, DELAYED RELEASE ORAL
Qty: 90 CAPSULE | Refills: 4 | Status: SHIPPED | OUTPATIENT
Start: 2019-11-04 | End: 2021-01-27

## 2019-11-15 ENCOUNTER — TRANSCRIBE ORDERS (OUTPATIENT)
Dept: FAMILY MEDICINE CLINIC | Facility: CLINIC | Age: 54
End: 2019-11-15

## 2019-11-15 DIAGNOSIS — L30.9 DERMATITIS, UNSPECIFIED: ICD-10-CM

## 2019-11-15 DIAGNOSIS — L95.9 VASCULITIS LIMITED TO THE SKIN, UNSPECIFIED: Primary | ICD-10-CM

## 2019-11-26 DIAGNOSIS — I63.519 ACUTE ISCHEMIC CEREBROVASCULAR ACCIDENT (CVA) INVOLVING MIDDLE CEREBRAL ARTERY TERRITORY (HCC): ICD-10-CM

## 2019-11-26 DIAGNOSIS — I48.21 PERMANENT ATRIAL FIBRILLATION (HCC): ICD-10-CM

## 2019-11-26 DIAGNOSIS — R80.9 CONTROLLED TYPE 2 DIABETES MELLITUS WITH MICROALBUMINURIA, WITHOUT LONG-TERM CURRENT USE OF INSULIN (HCC): ICD-10-CM

## 2019-11-26 DIAGNOSIS — E11.29 CONTROLLED TYPE 2 DIABETES MELLITUS WITH MICROALBUMINURIA, WITHOUT LONG-TERM CURRENT USE OF INSULIN (HCC): ICD-10-CM

## 2019-11-26 DIAGNOSIS — E11.9 TYPE 2 DIABETES MELLITUS WITHOUT COMPLICATION, UNSPECIFIED WHETHER LONG TERM INSULIN USE (HCC): ICD-10-CM

## 2019-12-24 DIAGNOSIS — I63.519 ACUTE ISCHEMIC CEREBROVASCULAR ACCIDENT (CVA) INVOLVING MIDDLE CEREBRAL ARTERY TERRITORY (HCC): ICD-10-CM

## 2019-12-24 DIAGNOSIS — E11.29 CONTROLLED TYPE 2 DIABETES MELLITUS WITH MICROALBUMINURIA, WITHOUT LONG-TERM CURRENT USE OF INSULIN (HCC): ICD-10-CM

## 2019-12-24 DIAGNOSIS — E11.9 TYPE 2 DIABETES MELLITUS WITHOUT COMPLICATION, UNSPECIFIED WHETHER LONG TERM INSULIN USE (HCC): ICD-10-CM

## 2019-12-24 DIAGNOSIS — I48.21 PERMANENT ATRIAL FIBRILLATION (HCC): ICD-10-CM

## 2019-12-24 DIAGNOSIS — R80.9 CONTROLLED TYPE 2 DIABETES MELLITUS WITH MICROALBUMINURIA, WITHOUT LONG-TERM CURRENT USE OF INSULIN (HCC): ICD-10-CM

## 2019-12-24 NOTE — TELEPHONE ENCOUNTER
This patient needs an appointment with [facility] as soon as possible has not been seen since April  Given 30 day supply

## 2019-12-26 NOTE — TELEPHONE ENCOUNTER
Informed patient of 30 day supply & needing an OV   He made one with MS for Thursday January 9th @ 4

## 2020-01-24 DIAGNOSIS — I63.519 ACUTE ISCHEMIC CEREBROVASCULAR ACCIDENT (CVA) INVOLVING MIDDLE CEREBRAL ARTERY TERRITORY (HCC): ICD-10-CM

## 2020-01-24 DIAGNOSIS — I48.21 PERMANENT ATRIAL FIBRILLATION (HCC): ICD-10-CM

## 2020-01-24 DIAGNOSIS — E11.9 TYPE 2 DIABETES MELLITUS WITHOUT COMPLICATION, UNSPECIFIED WHETHER LONG TERM INSULIN USE (HCC): ICD-10-CM

## 2020-01-24 DIAGNOSIS — R80.9 CONTROLLED TYPE 2 DIABETES MELLITUS WITH MICROALBUMINURIA, WITHOUT LONG-TERM CURRENT USE OF INSULIN (HCC): ICD-10-CM

## 2020-01-24 DIAGNOSIS — E11.29 CONTROLLED TYPE 2 DIABETES MELLITUS WITH MICROALBUMINURIA, WITHOUT LONG-TERM CURRENT USE OF INSULIN (HCC): ICD-10-CM

## 2020-01-24 PROCEDURE — 3066F NEPHROPATHY DOC TX: CPT | Performed by: FAMILY MEDICINE

## 2020-01-26 DIAGNOSIS — E11.29 CONTROLLED TYPE 2 DIABETES MELLITUS WITH MICROALBUMINURIA, WITHOUT LONG-TERM CURRENT USE OF INSULIN (HCC): ICD-10-CM

## 2020-01-26 DIAGNOSIS — I63.519 ACUTE ISCHEMIC CEREBROVASCULAR ACCIDENT (CVA) INVOLVING MIDDLE CEREBRAL ARTERY TERRITORY (HCC): ICD-10-CM

## 2020-01-26 DIAGNOSIS — E11.9 TYPE 2 DIABETES MELLITUS WITHOUT COMPLICATION, UNSPECIFIED WHETHER LONG TERM INSULIN USE (HCC): ICD-10-CM

## 2020-01-26 DIAGNOSIS — R80.9 CONTROLLED TYPE 2 DIABETES MELLITUS WITH MICROALBUMINURIA, WITHOUT LONG-TERM CURRENT USE OF INSULIN (HCC): ICD-10-CM

## 2020-01-26 DIAGNOSIS — I48.21 PERMANENT ATRIAL FIBRILLATION (HCC): ICD-10-CM

## 2020-01-27 RX ORDER — ATORVASTATIN CALCIUM 40 MG/1
TABLET, FILM COATED ORAL
Qty: 90 TABLET | Refills: 0 | Status: SHIPPED | OUTPATIENT
Start: 2020-01-27 | End: 2020-04-24

## 2020-01-30 ENCOUNTER — TRANSCRIBE ORDERS (OUTPATIENT)
Dept: FAMILY MEDICINE CLINIC | Facility: CLINIC | Age: 55
End: 2020-01-30

## 2020-01-30 DIAGNOSIS — I48.20 CHRONIC ATRIAL FIBRILLATION (HCC): Primary | ICD-10-CM

## 2020-01-30 DIAGNOSIS — I12.9 HYPERTENSIVE CHRONIC KIDNEY DISEASE WITH STAGE 1 THROUGH STAGE 4 CHRONIC KIDNEY DISEASE, OR UNSPECIFIED CHRONIC KIDNEY DISEASE: ICD-10-CM

## 2020-02-12 ENCOUNTER — HOSPITAL ENCOUNTER (OUTPATIENT)
Dept: NON INVASIVE DIAGNOSTICS | Facility: CLINIC | Age: 55
Discharge: HOME | End: 2020-02-12
Payer: COMMERCIAL

## 2020-02-12 DIAGNOSIS — I42.9 CARDIOMYOPATHY, UNSPECIFIED TYPE (HCC): ICD-10-CM

## 2020-02-12 PROCEDURE — 93306 TTE W/DOPPLER COMPLETE: CPT | Performed by: INTERNAL MEDICINE

## 2020-02-12 PROCEDURE — 93306 TTE W/DOPPLER COMPLETE: CPT

## 2020-02-16 DIAGNOSIS — E11.29 CONTROLLED TYPE 2 DIABETES MELLITUS WITH MICROALBUMINURIA, WITHOUT LONG-TERM CURRENT USE OF INSULIN (HCC): ICD-10-CM

## 2020-02-16 DIAGNOSIS — I48.21 PERMANENT ATRIAL FIBRILLATION (HCC): ICD-10-CM

## 2020-02-16 DIAGNOSIS — I63.519 ACUTE ISCHEMIC CEREBROVASCULAR ACCIDENT (CVA) INVOLVING MIDDLE CEREBRAL ARTERY TERRITORY (HCC): ICD-10-CM

## 2020-02-16 DIAGNOSIS — R80.9 CONTROLLED TYPE 2 DIABETES MELLITUS WITH MICROALBUMINURIA, WITHOUT LONG-TERM CURRENT USE OF INSULIN (HCC): ICD-10-CM

## 2020-02-16 DIAGNOSIS — E11.9 TYPE 2 DIABETES MELLITUS WITHOUT COMPLICATION, UNSPECIFIED WHETHER LONG TERM INSULIN USE (HCC): ICD-10-CM

## 2020-02-24 PROBLEM — R03.0 BORDERLINE HYPERTENSION: Status: ACTIVE | Noted: 2020-02-24

## 2020-02-24 PROBLEM — I63.10 CEREBROVASCULAR ACCIDENT (CVA) DUE TO EMBOLISM OF PRECEREBRAL ARTERY (HCC): Status: ACTIVE | Noted: 2020-02-24

## 2020-02-25 ENCOUNTER — OFFICE VISIT (OUTPATIENT)
Dept: CARDIOLOGY CLINIC | Facility: CLINIC | Age: 55
End: 2020-02-25
Payer: COMMERCIAL

## 2020-02-25 VITALS
WEIGHT: 218 LBS | DIASTOLIC BLOOD PRESSURE: 74 MMHG | HEIGHT: 72 IN | BODY MASS INDEX: 29.53 KG/M2 | RESPIRATION RATE: 16 BRPM | HEART RATE: 76 BPM | SYSTOLIC BLOOD PRESSURE: 126 MMHG

## 2020-02-25 DIAGNOSIS — I48.21 PERMANENT ATRIAL FIBRILLATION (HCC): Primary | ICD-10-CM

## 2020-02-25 DIAGNOSIS — I12.9 HYPERTENSIVE CHRONIC KIDNEY DISEASE WITH STAGE 1 THROUGH STAGE 4 CHRONIC KIDNEY DISEASE, OR UNSPECIFIED CHRONIC KIDNEY DISEASE: ICD-10-CM

## 2020-02-25 DIAGNOSIS — I45.10 RBBB: ICD-10-CM

## 2020-02-25 DIAGNOSIS — R03.0 BORDERLINE HYPERTENSION: ICD-10-CM

## 2020-02-25 PROCEDURE — 1036F TOBACCO NON-USER: CPT | Performed by: INTERNAL MEDICINE

## 2020-02-25 PROCEDURE — 3066F NEPHROPATHY DOC TX: CPT | Performed by: INTERNAL MEDICINE

## 2020-02-25 PROCEDURE — 3008F BODY MASS INDEX DOCD: CPT | Performed by: INTERNAL MEDICINE

## 2020-02-25 PROCEDURE — 99213 OFFICE O/P EST LOW 20 MIN: CPT | Performed by: INTERNAL MEDICINE

## 2020-02-25 NOTE — PROGRESS NOTES
Cardiology Follow Up    Vida Tyler  1965  [id number]  [address] [facility] [address] [phone]    Reason for visit:   2 year FU for chronic atrial fibrillation  Has hx of embolic TIA, DM and borderline HTN          1  Permanent atrial fibrillation     2  Cerebrovascular accident (CVA) due to embolism of precerebral artery (HCC)     3  Borderline hypertension     4  RBBB     5  Chronic atrial fibrillation  Ambulatory referral to Cardiology   6   Hypertensive chronic kidney disease with stage 1 through stage 4 chronic kidney disease, or unspecified chronic kidney disease  Ambulatory referral to Cardiology       Interval History:   Since the patient's last visit, he denies chest pain, shortness of breath, palpitations, edema or lightheadedness    Patient Active Problem List   Diagnosis    Allergic rhinitis    Atrial fibrillation ([place] [address] )    Chronic hepatitis C virus infection ([place] [address] )    Colon polyp    Controlled type 2 diabetes mellitus with microalbuminuria, without long-term current use of insulin (HCC)    Erectile dysfunction    Esophageal reflux    Microalbuminuria    Nocturia    Palpitations    RBBB    Thrombocytopenia (HCC)    Tobacco abuse    Hand dermatitis    Vasculitis of skin    Chronic pain of right ankle    Cerebrovascular accident (CVA) due to embolism of precerebral artery ([place] [address] )    Borderline hypertension     Past Medical History:   Diagnosis Date    A-fib ([place] [address] )     Diabetes mellitus ([place] [address] )      Social History     Socioeconomic History    Marital status: Single     Spouse name: Not on file    Number of children: Not on file    Years of education: Not on file    Highest education level: Not on file   Occupational History    Occupation: EMPLOYED   Social Needs    Financial resource strain: Not on file    Food insecurity:     Worry: Not on file     Inability: Not on file   [employer] needs:     Medical: Not on file     Non-medical: Not on file   Tobacco Use    Smoking status: Former Smoker     Packs/day: 0 20     Last attempt to quit: 2018     Years since quittin 5    Smokeless tobacco: Former User    Tobacco comment: EXPOSURE TO SECOND HAND SMOKE  ALL SCRIPTS ALSO SAYS FORMER SMOKER   Substance and Sexual Activity    Alcohol use: No     Frequency: Never    Drug use: No     Comment: ALL SCRIPTS SAYS ACTIVE    Sexual activity: Yes     Comment: [address] RELATIONSHIP   Lifestyle    Physical activity:     Days per week: Not on file     Minutes per session: Not on file    Stress: Not on file   Relationships    Social connections:     Talks on phone: Not on file     Gets together: Not on file     Attends [employer]ism service: Not on file     Active member of club or organization: Not on file     Attends meetings of clubs or organizations: Not on file     Relationship status: Not on file    Intimate partner violence:     Fear of current or ex partner: Not on file     Emotionally abused: Not on file     Physically abused: Not on file     Forced sexual activity: Not on file   Other Topics Concern    Not on file   Social History Narrative    Not on file      Family History   Problem Relation Age of Onset    Atrial fibrillation Mother     Hypertension Mother      Past Surgical History:   Procedure Laterality Date   Jagruti Vangjeovany FOR SUBDURAL HEMATOMA      COLONOSCOPY N/A 2016    Procedure: COLONOSCOPY;  Surgeon: Sung Meredith MD;  Location: [facility];   Service:     INGUINAL HERNIA REPAIR      SHOULDER SURGERY Left        Current Outpatient Medications:     apixaban (ELIQUIS) 5 mg, Take 1 tablet (5 mg total) by mouth 2 (two) times a day, Disp: 30 tablet, Rfl: 11    atorvastatin (LIPITOR) 40 mg tablet, TAKE 1 TABLET BY MOUTH EVERY DAY, Disp: 90 tablet, Rfl: 0    digoxin (LANOXIN) 0 25 mg tablet, TAKE 1 TABLET DAILY, Disp: 90 tablet, Rfl: 3    glucose blood (ONE TOUCH ULTRA TEST) test strip, 3 (three) times a day, Disp: , Rfl:     Lancets (ONETOUCH ULTRASOFT) lancets, 3 (three) times a day, Disp: , Rfl:     lisinopril (ZESTRIL) 2 5 mg tablet, TAKE 1 TABLET DAILY, Disp: 90 tablet, Rfl: 4    metFORMIN (GLUCOPHAGE) 500 mg tablet, TAKE 1 TABLET BY MOUTH TWICE A DAY, Disp: 180 tablet, Rfl: 1    metFORMIN (GLUCOPHAGE) 500 mg tablet, TAKE 1 TABLET TWICE A DAY, Disp: 180 tablet, Rfl: 4    metoprolol tartrate (LOPRESSOR) 25 mg tablet, TAKE 1/2 TABLET BY MOUTH EVERY 12 HOURS, Disp: 30 tablet, Rfl: 0    Milk Thistle 250 MG CAPS, Take by mouth, Disp: , Rfl:     omeprazole (PriLOSEC) 20 mg delayed release capsule, TAKE 1 CAPSULE DAILY, Disp: 90 capsule, Rfl: 4    valACYclovir (VALTREX) 1,000 mg tablet, Take 1 tablet (1,000 mg total) by mouth 3 (three) times a day for 6 days, Disp: 18 tablet, Rfl: 0  No Known Allergies    Review of Systems:  Review of Systems   Constitutional: Negative for fatigue and unexpected weight change  Respiratory: Negative for cough and shortness of breath  Cardiovascular: Negative for chest pain, palpitations and leg swelling  Gastrointestinal: Negative for blood in stool, constipation and diarrhea  Genitourinary: Negative for frequency and hematuria  Musculoskeletal: Negative for arthralgias and back pain  Neurological: Negative for dizziness, light-headedness and headaches  Psychiatric/Behavioral: Negative for agitation, behavioral problems, confusion and decreased concentration  Physical Exam:  Vitals:    02/25/20 1447   BP: 126/74   Pulse: 76   Resp: 16   Weight: 98 9 kg (218 lb)   Height: 6' (1 829 m)       Physical Exam   Constitutional: He appears well-developed and well-nourished  No distress  HENT:   Head: Normocephalic and atraumatic  Mouth/Throat: Oropharynx is clear and moist  No oropharyngeal exudate  Eyes: Conjunctivae are normal  No scleral icterus  Neck: Neck supple   Normal carotid pulses and no JVD present  Carotid bruit is not present  No thyromegaly present  Cardiovascular: Normal rate  An irregularly irregular rhythm present  Exam reveals no gallop and no friction rub  No murmur heard  Pulses:       Dorsalis pedis pulses are 2+ on the right side, and 2+ on the left side  Posterior tibial pulses are 2+ on the right side, and 2+ on the left side  Pulmonary/Chest: Breath sounds normal  He has no wheezes  He has no rhonchi  He has no rales  Abdominal: Soft  He exhibits no mass  There is no hepatosplenomegaly  There is no tenderness  Musculoskeletal: He exhibits no edema  Discussion/Summary:  1  Permanent AFIB  Unk Subhash Rate well controlled on digoxin and metoprolol    On eliquis for CVA prevention  2  Borderline HTN    Well controlled on lisinopril and metoprolol  3   RBBB- no evidence for symptomatic bradycardia    Note EF on recent echo normal exc for biatrial enlargement      FU one year      Marsha Fan MD

## 2020-03-13 DIAGNOSIS — I63.519 ACUTE ISCHEMIC CEREBROVASCULAR ACCIDENT (CVA) INVOLVING MIDDLE CEREBRAL ARTERY TERRITORY (HCC): ICD-10-CM

## 2020-03-13 DIAGNOSIS — E11.9 TYPE 2 DIABETES MELLITUS WITHOUT COMPLICATION, UNSPECIFIED WHETHER LONG TERM INSULIN USE (HCC): ICD-10-CM

## 2020-03-13 DIAGNOSIS — E11.29 CONTROLLED TYPE 2 DIABETES MELLITUS WITH MICROALBUMINURIA, WITHOUT LONG-TERM CURRENT USE OF INSULIN (HCC): ICD-10-CM

## 2020-03-13 DIAGNOSIS — R80.9 CONTROLLED TYPE 2 DIABETES MELLITUS WITH MICROALBUMINURIA, WITHOUT LONG-TERM CURRENT USE OF INSULIN (HCC): ICD-10-CM

## 2020-03-13 DIAGNOSIS — I48.21 PERMANENT ATRIAL FIBRILLATION (HCC): ICD-10-CM

## 2020-03-24 DIAGNOSIS — I48.20 CHRONIC ATRIAL FIBRILLATION (HCC): ICD-10-CM

## 2020-03-24 RX ORDER — APIXABAN 5 MG/1
TABLET, FILM COATED ORAL
Qty: 60 TABLET | Refills: 11 | Status: SHIPPED | OUTPATIENT
Start: 2020-03-24 | End: 2021-01-04 | Stop reason: SDUPTHER

## 2020-04-06 DIAGNOSIS — I48.21 PERMANENT ATRIAL FIBRILLATION (HCC): ICD-10-CM

## 2020-04-06 DIAGNOSIS — E11.9 TYPE 2 DIABETES MELLITUS WITHOUT COMPLICATION, UNSPECIFIED WHETHER LONG TERM INSULIN USE (HCC): ICD-10-CM

## 2020-04-06 RX ORDER — DIGOXIN 250 MCG
TABLET ORAL
Qty: 90 TABLET | Refills: 3 | Status: SHIPPED | OUTPATIENT
Start: 2020-04-06 | End: 2020-10-29

## 2020-04-09 ENCOUNTER — APPOINTMENT (OUTPATIENT)
Dept: LAB | Facility: MEDICAL CENTER | Age: 55
End: 2020-04-09
Payer: COMMERCIAL

## 2020-04-09 ENCOUNTER — TRANSCRIBE ORDERS (OUTPATIENT)
Dept: ADMINISTRATIVE | Facility: HOSPITAL | Age: 55
End: 2020-04-09

## 2020-04-09 DIAGNOSIS — E11.29 CONTROLLED TYPE 2 DIABETES MELLITUS WITH MICROALBUMINURIA, WITHOUT LONG-TERM CURRENT USE OF INSULIN (HCC): ICD-10-CM

## 2020-04-09 DIAGNOSIS — I63.519 ACUTE ISCHEMIC CEREBROVASCULAR ACCIDENT (CVA) INVOLVING MIDDLE CEREBRAL ARTERY TERRITORY (HCC): ICD-10-CM

## 2020-04-09 DIAGNOSIS — I48.21 PERMANENT ATRIAL FIBRILLATION (HCC): ICD-10-CM

## 2020-04-09 DIAGNOSIS — R80.9 CONTROLLED TYPE 2 DIABETES MELLITUS WITH MICROALBUMINURIA, WITHOUT LONG-TERM CURRENT USE OF INSULIN (HCC): ICD-10-CM

## 2020-04-09 DIAGNOSIS — K74.60 CIRRHOSIS OF LIVER WITHOUT ASCITES, UNSPECIFIED HEPATIC CIRRHOSIS TYPE (HCC): Primary | ICD-10-CM

## 2020-04-09 DIAGNOSIS — K74.60 CIRRHOSIS OF LIVER WITHOUT ASCITES, UNSPECIFIED HEPATIC CIRRHOSIS TYPE (HCC): ICD-10-CM

## 2020-04-09 DIAGNOSIS — E11.9 TYPE 2 DIABETES MELLITUS WITHOUT COMPLICATION, UNSPECIFIED WHETHER LONG TERM INSULIN USE (HCC): ICD-10-CM

## 2020-04-09 DIAGNOSIS — R80.9 MICROALBUMINURIA: ICD-10-CM

## 2020-04-09 LAB
AFP-TM SERPL-MCNC: 4 NG/ML (ref 0.5–8)
ALBUMIN SERPL BCP-MCNC: 3.9 G/DL (ref 3.5–5)
ALP SERPL-CCNC: 69 U/L (ref 46–116)
ALT SERPL W P-5'-P-CCNC: 24 U/L (ref 12–78)
ANION GAP SERPL CALCULATED.3IONS-SCNC: 3 MMOL/L (ref 4–13)
AST SERPL W P-5'-P-CCNC: 16 U/L (ref 5–45)
BASOPHILS # BLD AUTO: 0.02 THOUSANDS/ΜL (ref 0–0.1)
BASOPHILS NFR BLD AUTO: 0 % (ref 0–1)
BILIRUB SERPL-MCNC: 0.94 MG/DL (ref 0.2–1)
BUN SERPL-MCNC: 22 MG/DL (ref 5–25)
CALCIUM SERPL-MCNC: 8.8 MG/DL (ref 8.3–10.1)
CHLORIDE SERPL-SCNC: 104 MMOL/L (ref 100–108)
CHOLEST SERPL-MCNC: 114 MG/DL (ref 50–200)
CO2 SERPL-SCNC: 32 MMOL/L (ref 21–32)
CREAT SERPL-MCNC: 1.04 MG/DL (ref 0.6–1.3)
CREAT UR-MCNC: 157 MG/DL
EOSINOPHIL # BLD AUTO: 0.11 THOUSAND/ΜL (ref 0–0.61)
EOSINOPHIL NFR BLD AUTO: 2 % (ref 0–6)
ERYTHROCYTE [DISTWIDTH] IN BLOOD BY AUTOMATED COUNT: 14.3 % (ref 11.6–15.1)
EST. AVERAGE GLUCOSE BLD GHB EST-MCNC: 134 MG/DL
GFR SERPL CREATININE-BSD FRML MDRD: 80 ML/MIN/1.73SQ M
GLUCOSE P FAST SERPL-MCNC: 176 MG/DL (ref 65–99)
HBA1C MFR BLD: 6.3 %
HCT VFR BLD AUTO: 44.5 % (ref 36.5–49.3)
HDLC SERPL-MCNC: 30 MG/DL
HGB BLD-MCNC: 14.7 G/DL (ref 12–17)
IMM GRANULOCYTES # BLD AUTO: 0.01 THOUSAND/UL (ref 0–0.2)
IMM GRANULOCYTES NFR BLD AUTO: 0 % (ref 0–2)
INR PPP: 1.31 (ref 0.84–1.19)
LDLC SERPL CALC-MCNC: 63 MG/DL (ref 0–100)
LYMPHOCYTES # BLD AUTO: 1.44 THOUSANDS/ΜL (ref 0.6–4.47)
LYMPHOCYTES NFR BLD AUTO: 24 % (ref 14–44)
MCH RBC QN AUTO: 30.6 PG (ref 26.8–34.3)
MCHC RBC AUTO-ENTMCNC: 33 G/DL (ref 31.4–37.4)
MCV RBC AUTO: 93 FL (ref 82–98)
MICROALBUMIN UR-MCNC: 169 MG/L (ref 0–20)
MICROALBUMIN/CREAT 24H UR: 108 MG/G CREATININE (ref 0–30)
MONOCYTES # BLD AUTO: 0.57 THOUSAND/ΜL (ref 0.17–1.22)
MONOCYTES NFR BLD AUTO: 9 % (ref 4–12)
NEUTROPHILS # BLD AUTO: 3.91 THOUSANDS/ΜL (ref 1.85–7.62)
NEUTS SEG NFR BLD AUTO: 65 % (ref 43–75)
NRBC BLD AUTO-RTO: 0 /100 WBCS
PLATELET # BLD AUTO: 117 THOUSANDS/UL (ref 149–390)
PMV BLD AUTO: 11.2 FL (ref 8.9–12.7)
POTASSIUM SERPL-SCNC: 4.5 MMOL/L (ref 3.5–5.3)
PROT SERPL-MCNC: 6.9 G/DL (ref 6.4–8.2)
PROTHROMBIN TIME: 15.9 SECONDS (ref 11.6–14.5)
RBC # BLD AUTO: 4.81 MILLION/UL (ref 3.88–5.62)
SODIUM SERPL-SCNC: 139 MMOL/L (ref 136–145)
TRIGL SERPL-MCNC: 105 MG/DL
TSH SERPL DL<=0.05 MIU/L-ACNC: 2.11 UIU/ML (ref 0.36–3.74)
WBC # BLD AUTO: 6.06 THOUSAND/UL (ref 4.31–10.16)

## 2020-04-09 PROCEDURE — 3060F POS MICROALBUMINURIA REV: CPT | Performed by: FAMILY MEDICINE

## 2020-04-09 PROCEDURE — 80061 LIPID PANEL: CPT

## 2020-04-09 PROCEDURE — 82570 ASSAY OF URINE CREATININE: CPT

## 2020-04-09 PROCEDURE — 82043 UR ALBUMIN QUANTITATIVE: CPT

## 2020-04-09 PROCEDURE — 82105 ALPHA-FETOPROTEIN SERUM: CPT

## 2020-04-09 PROCEDURE — 85610 PROTHROMBIN TIME: CPT

## 2020-04-09 PROCEDURE — 80053 COMPREHEN METABOLIC PANEL: CPT

## 2020-04-09 PROCEDURE — 36415 COLL VENOUS BLD VENIPUNCTURE: CPT

## 2020-04-09 PROCEDURE — 84443 ASSAY THYROID STIM HORMONE: CPT

## 2020-04-09 PROCEDURE — 85025 COMPLETE CBC W/AUTO DIFF WBC: CPT

## 2020-04-09 PROCEDURE — 83036 HEMOGLOBIN GLYCOSYLATED A1C: CPT

## 2020-04-10 ENCOUNTER — OFFICE VISIT (OUTPATIENT)
Dept: FAMILY MEDICINE CLINIC | Facility: CLINIC | Age: 55
End: 2020-04-10
Payer: COMMERCIAL

## 2020-04-10 VITALS
BODY MASS INDEX: 29.39 KG/M2 | DIASTOLIC BLOOD PRESSURE: 66 MMHG | SYSTOLIC BLOOD PRESSURE: 112 MMHG | TEMPERATURE: 98.3 F | WEIGHT: 217 LBS | HEIGHT: 72 IN | HEART RATE: 68 BPM

## 2020-04-10 DIAGNOSIS — D69.6 THROMBOCYTOPENIA (HCC): ICD-10-CM

## 2020-04-10 DIAGNOSIS — R80.9 MICROALBUMINURIA: ICD-10-CM

## 2020-04-10 DIAGNOSIS — Z72.0 TOBACCO ABUSE: ICD-10-CM

## 2020-04-10 DIAGNOSIS — I48.21 PERMANENT ATRIAL FIBRILLATION (HCC): ICD-10-CM

## 2020-04-10 DIAGNOSIS — R80.9 CONTROLLED TYPE 2 DIABETES MELLITUS WITH MICROALBUMINURIA, WITHOUT LONG-TERM CURRENT USE OF INSULIN (HCC): ICD-10-CM

## 2020-04-10 DIAGNOSIS — I63.10 CEREBROVASCULAR ACCIDENT (CVA) DUE TO EMBOLISM OF PRECEREBRAL ARTERY (HCC): ICD-10-CM

## 2020-04-10 DIAGNOSIS — B18.2 CHRONIC HEPATITIS C WITHOUT HEPATIC COMA (HCC): Primary | ICD-10-CM

## 2020-04-10 DIAGNOSIS — E11.29 CONTROLLED TYPE 2 DIABETES MELLITUS WITH MICROALBUMINURIA, WITHOUT LONG-TERM CURRENT USE OF INSULIN (HCC): ICD-10-CM

## 2020-04-10 DIAGNOSIS — E66.3 OVERWEIGHT (BMI 25.0-29.9): ICD-10-CM

## 2020-04-10 DIAGNOSIS — K74.69 OTHER CIRRHOSIS OF LIVER (HCC): ICD-10-CM

## 2020-04-10 DIAGNOSIS — K63.5 POLYP OF COLON, UNSPECIFIED PART OF COLON, UNSPECIFIED TYPE: ICD-10-CM

## 2020-04-10 PROBLEM — R03.0 BORDERLINE HYPERTENSION: Status: RESOLVED | Noted: 2020-02-24 | Resolved: 2020-04-10

## 2020-04-10 PROBLEM — I10 ESSENTIAL HYPERTENSION: Status: ACTIVE | Noted: 2020-04-10

## 2020-04-10 PROBLEM — K74.60 CIRRHOSIS OF LIVER (HCC): Status: ACTIVE | Noted: 2020-04-10

## 2020-04-10 PROCEDURE — 99214 OFFICE O/P EST MOD 30 MIN: CPT | Performed by: FAMILY MEDICINE

## 2020-04-10 PROCEDURE — 3008F BODY MASS INDEX DOCD: CPT | Performed by: FAMILY MEDICINE

## 2020-04-10 PROCEDURE — 3078F DIAST BP <80 MM HG: CPT | Performed by: FAMILY MEDICINE

## 2020-04-10 PROCEDURE — 1036F TOBACCO NON-USER: CPT | Performed by: FAMILY MEDICINE

## 2020-04-10 PROCEDURE — 3060F POS MICROALBUMINURIA REV: CPT | Performed by: FAMILY MEDICINE

## 2020-04-10 PROCEDURE — 3044F HG A1C LEVEL LT 7.0%: CPT | Performed by: FAMILY MEDICINE

## 2020-04-10 PROCEDURE — 3074F SYST BP LT 130 MM HG: CPT | Performed by: FAMILY MEDICINE

## 2020-04-10 PROCEDURE — 3066F NEPHROPATHY DOC TX: CPT | Performed by: FAMILY MEDICINE

## 2020-04-24 DIAGNOSIS — E11.29 CONTROLLED TYPE 2 DIABETES MELLITUS WITH MICROALBUMINURIA, WITHOUT LONG-TERM CURRENT USE OF INSULIN (HCC): ICD-10-CM

## 2020-04-24 DIAGNOSIS — E11.9 TYPE 2 DIABETES MELLITUS WITHOUT COMPLICATION, UNSPECIFIED WHETHER LONG TERM INSULIN USE (HCC): ICD-10-CM

## 2020-04-24 DIAGNOSIS — R80.9 CONTROLLED TYPE 2 DIABETES MELLITUS WITH MICROALBUMINURIA, WITHOUT LONG-TERM CURRENT USE OF INSULIN (HCC): ICD-10-CM

## 2020-04-24 DIAGNOSIS — I48.21 PERMANENT ATRIAL FIBRILLATION (HCC): ICD-10-CM

## 2020-04-24 DIAGNOSIS — I63.519 ACUTE ISCHEMIC CEREBROVASCULAR ACCIDENT (CVA) INVOLVING MIDDLE CEREBRAL ARTERY TERRITORY (HCC): ICD-10-CM

## 2020-04-24 RX ORDER — ATORVASTATIN CALCIUM 40 MG/1
TABLET, FILM COATED ORAL
Qty: 90 TABLET | Refills: 0 | Status: SHIPPED | OUTPATIENT
Start: 2020-04-24 | End: 2020-07-16

## 2020-06-09 ENCOUNTER — TRANSCRIBE ORDERS (OUTPATIENT)
Dept: FAMILY MEDICINE CLINIC | Facility: CLINIC | Age: 55
End: 2020-06-09

## 2020-06-09 DIAGNOSIS — Z86.010 PERSONAL HISTORY OF COLONIC POLYPS: Primary | ICD-10-CM

## 2020-06-25 DIAGNOSIS — Z01.812 PRE-PROCEDURAL LABORATORY EXAMINATIONS: ICD-10-CM

## 2020-06-25 PROCEDURE — U0003 INFECTIOUS AGENT DETECTION BY NUCLEIC ACID (DNA OR RNA); SEVERE ACUTE RESPIRATORY SYNDROME CORONAVIRUS 2 (SARS-COV-2) (CORONAVIRUS DISEASE [COVID-19]), AMPLIFIED PROBE TECHNIQUE, MAKING USE OF HIGH THROUGHPUT TECHNOLOGIES AS DESCRIBED BY CMS-2020-01-R: HCPCS

## 2020-06-26 ENCOUNTER — ANESTHESIA EVENT (OUTPATIENT)
Dept: GASTROENTEROLOGY | Facility: HOSPITAL | Age: 55
End: 2020-06-26

## 2020-06-28 LAB — SARS-COV-2 RNA SPEC QL NAA+PROBE: NOT DETECTED

## 2020-06-29 ENCOUNTER — HOSPITAL ENCOUNTER (OUTPATIENT)
Dept: GASTROENTEROLOGY | Facility: HOSPITAL | Age: 55
Setting detail: OUTPATIENT SURGERY
Discharge: HOME | End: 2020-06-29
Attending: COLON & RECTAL SURGERY
Payer: COMMERCIAL

## 2020-06-29 ENCOUNTER — ANESTHESIA (OUTPATIENT)
Dept: GASTROENTEROLOGY | Facility: HOSPITAL | Age: 55
End: 2020-06-29

## 2020-06-29 VITALS
RESPIRATION RATE: 20 BRPM | WEIGHT: 217 LBS | OXYGEN SATURATION: 98 % | BODY MASS INDEX: 29.39 KG/M2 | HEIGHT: 72 IN | HEART RATE: 60 BPM | DIASTOLIC BLOOD PRESSURE: 77 MMHG | SYSTOLIC BLOOD PRESSURE: 143 MMHG | TEMPERATURE: 98.9 F

## 2020-06-29 DIAGNOSIS — Z86.010 PERSONAL HISTORY OF COLONIC POLYPS: ICD-10-CM

## 2020-06-29 DIAGNOSIS — Z12.11 ENCOUNTER FOR SCREENING FOR MALIGNANT NEOPLASM OF COLON: ICD-10-CM

## 2020-06-29 LAB — GLUCOSE SERPL-MCNC: 167 MG/DL (ref 65–140)

## 2020-06-29 PROCEDURE — 88305 TISSUE EXAM BY PATHOLOGIST: CPT | Performed by: PATHOLOGY

## 2020-06-29 PROCEDURE — 82948 REAGENT STRIP/BLOOD GLUCOSE: CPT

## 2020-06-29 RX ORDER — SODIUM CHLORIDE 9 MG/ML
125 INJECTION, SOLUTION INTRAVENOUS CONTINUOUS
Status: DISCONTINUED | OUTPATIENT
Start: 2020-06-29 | End: 2020-06-29

## 2020-06-29 RX ORDER — LIDOCAINE HYDROCHLORIDE 20 MG/ML
INJECTION, SOLUTION EPIDURAL; INFILTRATION; INTRACAUDAL; PERINEURAL AS NEEDED
Status: DISCONTINUED | OUTPATIENT
Start: 2020-06-29 | End: 2020-06-29 | Stop reason: SURG

## 2020-06-29 RX ORDER — PROPOFOL 10 MG/ML
INJECTION, EMULSION INTRAVENOUS AS NEEDED
Status: DISCONTINUED | OUTPATIENT
Start: 2020-06-29 | End: 2020-06-29 | Stop reason: SURG

## 2020-06-29 RX ADMIN — LIDOCAINE HYDROCHLORIDE 100 MG: 20 INJECTION, SOLUTION EPIDURAL; INFILTRATION; INTRACAUDAL; PERINEURAL at 07:39

## 2020-06-29 RX ADMIN — PROPOFOL 50 MG: 10 INJECTION, EMULSION INTRAVENOUS at 07:40

## 2020-06-29 RX ADMIN — PROPOFOL 50 MG: 10 INJECTION, EMULSION INTRAVENOUS at 07:47

## 2020-06-29 RX ADMIN — PROPOFOL 50 MG: 10 INJECTION, EMULSION INTRAVENOUS at 07:43

## 2020-06-29 RX ADMIN — SODIUM CHLORIDE 125 ML/HR: 0.9 INJECTION, SOLUTION INTRAVENOUS at 07:14

## 2020-06-29 RX ADMIN — PROPOFOL 50 MG: 10 INJECTION, EMULSION INTRAVENOUS at 07:54

## 2020-06-29 RX ADMIN — PROPOFOL 100 MG: 10 INJECTION, EMULSION INTRAVENOUS at 07:39

## 2020-06-29 NOTE — INTERVAL H&P NOTE
H&P reviewed  After examining the patient I find no changes in the patients condition since the H&P had been written      Vitals:    06/29/20 0702   BP: 144/81   Pulse: (!) 52   Resp: 16   Temp: 98 9 °F (37 2 °C)   SpO2: 99%

## 2020-06-29 NOTE — DISCHARGE INSTRUCTIONS
[facility] Khushbu Camargo [address] [facility], [address] [place] [address] Alexandru  Phone: [phone]    DISCHARGE INSTRUCTIONS:    1   ___ Complete Exam - Normal    2   ___ Exam normal, but entire colon not seen  We will discuss this with you  3   ___ Polyp(s) removed by "burning" - NO pathology report will follow    4   _3__ Polyp(s) removed by excision  Pathology report will be available in 4-5 days   Someone from our office will call you with results  5   ___ Exam prompted biopsies  Pathology report will be available in 4-5 days   Someone from our office will call you with results  6   ___ Exam demonstrated findings that need treatment  Prescriptions will be   Given to you  Return to our office in ____ weeks  Please call for appt  7   ___ Original office visit or colonoscopy findings necessitate an office visit  Please call to set up a new appointment    8   ___ Medication  _MAY 101 [facility]:    - Go straight home and rest today    - No driving or drinking alcohol for 24 hours    - Resume regular diet and medications unless otherwise instructed  Coumadin and Plavix are blood thinners  You can resume these medications on __________  IF YOU ARE HAVING ANY FEVER, BLEEDING OR PERSISTENT PAIN IN THE ABDOMEN, PLEASE CALL OUR OFFICE ANY DAY OR TIME  [phone]    Colorectal Polyps   WHAT YOU NEED TO KNOW:   Colorectal polyps are small growths of tissue in the lining of the colon and rectum  Most polyps are hyperplastic polyps and are usually benign (noncancerous)  Certain types of polyps, called adenomatous polyps, may turn into cancer  DISCHARGE INSTRUCTIONS:   Follow up with your healthcare provider or gastroenterologist as directed: You may need to return for more tests, such as another colonoscopy  Write down your questions so you remember to ask them during your visits    Reduce your risk for colorectal polyps:   · Eat a variety of healthy foods:  Healthy foods include fruit, vegetables, whole-grain breads, low-fat dairy products, beans, lean meat, and fish  Ask if you need to be on a special diet  · Maintain a healthy weight:  Ask your healthcare provider if you need to lose weight and how much you need to lose  Ask for help with a weight loss program     · Exercise:  Begin to exercise slowly and do more as you get stronger  Talk with your healthcare provider before you start an exercise program      · Limit alcohol:  Your risk for polyps increases the more you drink  · Do not smoke: If you smoke, it is never too late to quit  Ask for information about how to stop  For support and more information:   · Norman Rendon ([facility]) [address], [place] [address]  Phone: [phone]  Web Address: www digestive  niddk nih gov  Contact your healthcare provider or gastroenterologist if:   · You have a fever  · You have chills, a cough, or feel weak and achy  · You have abdominal pain that does not go away or gets worse after you take medicine  · Your abdomen is swollen  · You are losing weight without trying  · You have questions or concerns about your condition or care  Seek care immediately or call 911 if:   · You have sudden shortness of breath  · You have a fast heart rate, fast breathing, or are too dizzy to stand up  · You have severe abdominal pain  · You see blood in your bowel movement  © 2017 [address] Information is for End User's use only and may not be sold, redistributed or otherwise used for commercial purposes  All illustrations and images included in CareNotes® are the copyrighted property of [employer]  or Rob Rich  The above information is an  only  It is not intended as medical advice for individual conditions or treatments   Talk to your doctor, nurse or pharmacist before following any medical regimen to see if it is safe and effective for you

## 2020-07-06 ENCOUNTER — TRANSCRIBE ORDERS (OUTPATIENT)
Dept: FAMILY MEDICINE CLINIC | Facility: CLINIC | Age: 55
End: 2020-07-06

## 2020-07-06 DIAGNOSIS — K74.60 UNSPECIFIED CIRRHOSIS OF LIVER (HCC): Primary | ICD-10-CM

## 2020-07-15 ENCOUNTER — OFFICE VISIT (OUTPATIENT)
Dept: FAMILY MEDICINE CLINIC | Facility: CLINIC | Age: 55
End: 2020-07-15
Payer: COMMERCIAL

## 2020-07-15 VITALS
HEIGHT: 72 IN | DIASTOLIC BLOOD PRESSURE: 76 MMHG | TEMPERATURE: 98 F | HEART RATE: 84 BPM | BODY MASS INDEX: 28.71 KG/M2 | WEIGHT: 212 LBS | SYSTOLIC BLOOD PRESSURE: 130 MMHG

## 2020-07-15 DIAGNOSIS — I63.519 ACUTE ISCHEMIC CEREBROVASCULAR ACCIDENT (CVA) INVOLVING MIDDLE CEREBRAL ARTERY TERRITORY (HCC): ICD-10-CM

## 2020-07-15 DIAGNOSIS — E11.9 TYPE 2 DIABETES MELLITUS WITHOUT COMPLICATION, UNSPECIFIED WHETHER LONG TERM INSULIN USE (HCC): ICD-10-CM

## 2020-07-15 DIAGNOSIS — R80.9 CONTROLLED TYPE 2 DIABETES MELLITUS WITH MICROALBUMINURIA, WITHOUT LONG-TERM CURRENT USE OF INSULIN (HCC): ICD-10-CM

## 2020-07-15 DIAGNOSIS — K63.5 POLYP OF COLON, UNSPECIFIED PART OF COLON, UNSPECIFIED TYPE: ICD-10-CM

## 2020-07-15 DIAGNOSIS — M79.10 MUSCLE ACHE: Primary | ICD-10-CM

## 2020-07-15 DIAGNOSIS — I48.21 PERMANENT ATRIAL FIBRILLATION (HCC): ICD-10-CM

## 2020-07-15 DIAGNOSIS — I10 ESSENTIAL HYPERTENSION: ICD-10-CM

## 2020-07-15 DIAGNOSIS — E11.29 CONTROLLED TYPE 2 DIABETES MELLITUS WITH MICROALBUMINURIA, WITHOUT LONG-TERM CURRENT USE OF INSULIN (HCC): ICD-10-CM

## 2020-07-15 PROCEDURE — 3060F POS MICROALBUMINURIA REV: CPT | Performed by: FAMILY MEDICINE

## 2020-07-15 PROCEDURE — 3078F DIAST BP <80 MM HG: CPT | Performed by: FAMILY MEDICINE

## 2020-07-15 PROCEDURE — 3066F NEPHROPATHY DOC TX: CPT | Performed by: FAMILY MEDICINE

## 2020-07-15 PROCEDURE — 3008F BODY MASS INDEX DOCD: CPT | Performed by: FAMILY MEDICINE

## 2020-07-15 PROCEDURE — 3075F SYST BP GE 130 - 139MM HG: CPT | Performed by: FAMILY MEDICINE

## 2020-07-15 PROCEDURE — 99214 OFFICE O/P EST MOD 30 MIN: CPT | Performed by: FAMILY MEDICINE

## 2020-07-15 PROCEDURE — 3044F HG A1C LEVEL LT 7.0%: CPT | Performed by: FAMILY MEDICINE

## 2020-07-15 PROCEDURE — 1036F TOBACCO NON-USER: CPT | Performed by: FAMILY MEDICINE

## 2020-07-15 NOTE — PROGRESS NOTES
Assessment and Plan:    Problem List Items Addressed This Visit     Atrial fibrillation ([facility] [address] )     Currently on Eliquis  Continue same  No changes  Also on rate control with Lanoxin, metoprolol  Colon polyp     Patient did have colonoscopy recently  Will follow with colorectal surgery  Controlled type 2 diabetes mellitus with microalbuminuria, without long-term current use of insulin (HCC)     A1c recently was quite good at 6 3, especially as he is currently on metformin only  With regard to this, I would not make any adjustments today  Lab Results   Component Value Date    HGBA1C 6 3 (H) 04/09/2020            Essential hypertension     Blood pressure doing well  No change  Muscle ache - Primary     Patient does have a significant amount of muscle aches on the inside of the thighs bilaterally  Question if this is related to atorvastatin  Also, could just be muscle spasm  For right now, increase water intake  Stop atorvastatin for 2 weeks  Restart atorvastatin after that  Look for changes in his symptoms  If there is no change with the Lipitor being discontinued for 2 weeks, consider laboratory studies earlier than his next scheduled time             Other Visit Diagnoses     Acute ischemic cerebrovascular accident (CVA) involving middle cerebral artery territory [facility])        Type 2 diabetes mellitus without complication, unspecified whether long term insulin use ([place] [address] )                     Diagnoses and all orders for this visit:    Muscle ache    P[facility] ([facility] )    Essential hypertension    Controlled type 2 diabetes mellitus with microalbuminuria, without long-term current use of insulin (HCC)    Polyp of colon, unspecified part of colon, unspecified type    Acute ischemic cerebrovascular accident (CVA) involving middle cerebral artery territory [facility])    Type 2 diabetes mellitus without complication, unspecified whether long term insulin use ([place] [address] )    Other orders  -     econazole nitrate 1 % cream; APPLY TO AFFECTED AREA ON THE HANDS TWICE A DAY              Subjective:      Patient ID: Nelwdalila East is a 63 y o  male  CC:    Chief Complaint   Patient presents with    Leg Pain     Leg cramps bilateral inner thighs when he gets up in the morning   -  [facility]       HPI:    Patient is awake and lately with some leg cramps  Is inside both legs  Notes it when he gets up from the morning  Is not every morning  Patient did have some [place] food recently, and thought that may have been part of the issue  Patient does have a history of atrial fibrillation  Has been on several medications for this including rate control with ditch  Hypertension:  Currently on several medications  Has been doing quite well  Hyperlipidemia:  Patient is on statins, mostly for his diabetes  Has been taking for a while now  Diabetes:  Currently on metformin  Has been doing relatively well  The following portions of the patient's history were reviewed and updated as appropriate: allergies, current medications, past family history, past medical history, past social history, past surgical history and problem list       Review of Systems   Constitutional: Negative  HENT: Negative  Eyes: Negative  Respiratory: Negative  Musculoskeletal: Positive for arthralgias and myalgias  All other systems reviewed and are negative  Data to review:       Objective:    Vitals:    07/15/20 1440   BP: 130/76   BP Location: Left arm   Patient Position: Sitting   Cuff Size: Large   Pulse: 84   Temp: 98 °F (36 7 °C)   TempSrc: Temporal   Weight: 96 2 kg (212 lb)   Height: 6' (1 829 m)        Physical Exam   Constitutional: He appears well-developed and well-nourished  HENT:   Head: Normocephalic and atraumatic  Neck: Normal range of motion  Neck supple  Cardiovascular: Normal rate, regular rhythm and normal heart sounds  Exam reveals no gallop and no friction rub  No murmur heard  Pulses:       Carotid pulses are 2+ on the right side, and 2+ on the left side  Pulmonary/Chest: Effort normal and breath sounds normal  No respiratory distress  He has no wheezes  He has no rales  Musculoskeletal:   Some tenderness at the inside of the thighs bilaterally  Some irritation in the tendons  Nursing note and vitals reviewed

## 2020-07-15 NOTE — ASSESSMENT & PLAN NOTE
A1c recently was quite good at 6 3, especially as he is currently on metformin only  With regard to this, I would not make any adjustments today    Lab Results   Component Value Date    HGBA1C 6 3 (H) 04/09/2020

## 2020-07-15 NOTE — ASSESSMENT & PLAN NOTE
Patient does have a significant amount of muscle aches on the inside of the thighs bilaterally  Question if this is related to atorvastatin  Also, could just be muscle spasm  For right now, increase water intake  Stop atorvastatin for 2 weeks  Restart atorvastatin after that  Look for changes in his symptoms  If there is no change with the Lipitor being discontinued for 2 weeks, consider laboratory studies earlier than his next scheduled time

## 2020-07-15 NOTE — PATIENT INSTRUCTIONS
Problem List Items Addressed This Visit     Atrial fibrillation ([facility] [address] )     Currently on Eliquis  Continue same  No changes  Also on rate control with Lanoxin, metoprolol  Colon polyp     Patient did have colonoscopy recently  Will follow with colorectal surgery  Controlled type 2 diabetes mellitus with microalbuminuria, without long-term current use of insulin ([facility])     A1c recently was quite good at 6 3, especially as he is currently on metformin only  With regard to this, I would not make any adjustments today  Lab Results   Component Value Date    HGBA1C 6 3 (H) 04/09/2020            Essential hypertension     Blood pressure doing well  No change  Muscle ache - Primary     Patient does have a significant amount of muscle aches on the inside of the thighs bilaterally  Question if this is related to atorvastatin  Also, could just be muscle spasm  For right now, increase water intake  Stop atorvastatin for 2 weeks  Restart atorvastatin after that  Look for changes in his symptoms  If there is no change with the Lipitor being discontinued for 2 weeks, consider laboratory studies earlier than his next scheduled time  Other Visit Diagnoses     Acute ischemic cerebrovascular accident (CVA) involving middle cerebral artery territory [facility])        Type 2 diabetes mellitus without complication, unspecified whether long term insulin use ([facility] )              COVID 19 Instructions    Nasir Mackenzie was advised to limit contact with others to essential tasks such as getting food, medications, and medical care  Proper handwashing reviewed, and Hand sanitzer when washing is not available  If the patient develops symptoms of COVID 19, the patient should call the office as soon as possible  Please try to download Google Duo  Once you do download this on your phone, you will be prompted to add your phone number to the account    After that, he should receive a text from Google, and use that code to verify your phone number  After that, you should be able to use Google Duo to receive and make video calls  Please download Microsoft Teams to your phone or computer  We will be transitioning to this platform for Video Visits  Instructions for downloading this are available from the office

## 2020-07-16 ENCOUNTER — TELEPHONE (OUTPATIENT)
Dept: ADMINISTRATIVE | Facility: OTHER | Age: 55
End: 2020-07-16

## 2020-07-16 DIAGNOSIS — I48.21 PERMANENT ATRIAL FIBRILLATION (HCC): ICD-10-CM

## 2020-07-16 DIAGNOSIS — E11.29 CONTROLLED TYPE 2 DIABETES MELLITUS WITH MICROALBUMINURIA, WITHOUT LONG-TERM CURRENT USE OF INSULIN (HCC): ICD-10-CM

## 2020-07-16 DIAGNOSIS — I63.519 ACUTE ISCHEMIC CEREBROVASCULAR ACCIDENT (CVA) INVOLVING MIDDLE CEREBRAL ARTERY TERRITORY (HCC): ICD-10-CM

## 2020-07-16 DIAGNOSIS — E11.9 TYPE 2 DIABETES MELLITUS WITHOUT COMPLICATION, UNSPECIFIED WHETHER LONG TERM INSULIN USE (HCC): ICD-10-CM

## 2020-07-16 DIAGNOSIS — R80.9 CONTROLLED TYPE 2 DIABETES MELLITUS WITH MICROALBUMINURIA, WITHOUT LONG-TERM CURRENT USE OF INSULIN (HCC): ICD-10-CM

## 2020-07-16 RX ORDER — ATORVASTATIN CALCIUM 40 MG/1
TABLET, FILM COATED ORAL
Qty: 90 TABLET | Refills: 0 | Status: SHIPPED | OUTPATIENT
Start: 2020-07-16 | End: 2020-10-15

## 2020-07-16 NOTE — TELEPHONE ENCOUNTER
----- Message from Terry Mcnulty sent at 7/15/2020  3:26 PM EDT -----  Regarding: diabetic eye exam / [facility]ente Georges primary care  Contact: [phone]  07/15/20 3:28 PM    Hello, our patient Freddie Medina has had Diabetic Eye Exam completed/performed  Please assist in updating the patient chart by making an External outreach to Dr Liam Miranda facility located in [facility]  The date of service is 6 months ago       Thank you,  Aris Polanco   [facility]

## 2020-07-16 NOTE — LETTER
Diabetic Eye Exam Form    Date Requested: 20  Patient: Angelica Barbosa  Patient : 1965   Referring Provider: Liat Barger MD    Dilated Retinal Exam, Optomap-Iris Exam, or Fundus Photography Done         Yes (Koi one above)         No     Date of Diabetic Eye Exam ______________________________  Left Eye      Exam did show retinopathy    Exam did not show retinopathy         Mild       Moderate       None       Proliferative       Severe     Right Eye     Exam did show retinopathy    Exam did not show retinopathy         Mild       Moderate       None       Proliferative       Severe     Comments __________________________________________________________    Practice Providing Exam ______________________________________________    Exam Performed By (print name) _______________________________________      Provider Signature ___________________________________________________      These reports are needed for  compliance    Please fax this completed form and a copy of the Diabetic Eye Exam report to our office located at [place] [address] as soon as possible to [phone] kamlesh Saravia Picking: Phone [phone]    We thank you for your assistance in treating our mutual patient

## 2020-07-16 NOTE — TELEPHONE ENCOUNTER
Upon review of the In Basket request and the patient's chart, initial outreach has been made via fax, please see Contacts section for details  A second outreach attempt will be made within 5 business days      Thank you  Amadeo Trimble MA

## 2020-07-21 NOTE — TELEPHONE ENCOUNTER
Upon review of the In Basket request we were able to locate, review, and update the patient chart as requested for Diabetic Eye Exam     Any additional questions or concerns should be emailed to the Practice Liaisons via [email or web address] email, please do not reply via In Basket      Thank you  Juan Ramon Fenton MA

## 2020-07-24 ENCOUNTER — TRANSCRIBE ORDERS (OUTPATIENT)
Dept: FAMILY MEDICINE CLINIC | Facility: CLINIC | Age: 55
End: 2020-07-24

## 2020-07-24 DIAGNOSIS — E11.29 TYPE 2 DIABETES MELLITUS WITH OTHER DIABETIC KIDNEY COMPLICATION (HCC): Primary | ICD-10-CM

## 2020-07-25 ENCOUNTER — TRANSCRIBE ORDERS (OUTPATIENT)
Dept: ADMINISTRATIVE | Facility: HOSPITAL | Age: 55
End: 2020-07-25

## 2020-07-25 ENCOUNTER — APPOINTMENT (OUTPATIENT)
Dept: LAB | Facility: MEDICAL CENTER | Age: 55
End: 2020-07-25
Payer: COMMERCIAL

## 2020-07-25 DIAGNOSIS — R80.9 PROTEINURIA, UNSPECIFIED TYPE: ICD-10-CM

## 2020-07-25 DIAGNOSIS — E11.29 TYPE 2 DIABETES MELLITUS WITH OTHER KIDNEY COMPLICATION, UNSPECIFIED WHETHER LONG TERM INSULIN USE (HCC): Primary | ICD-10-CM

## 2020-07-25 DIAGNOSIS — E11.29 TYPE 2 DIABETES MELLITUS WITH OTHER KIDNEY COMPLICATION, UNSPECIFIED WHETHER LONG TERM INSULIN USE (HCC): ICD-10-CM

## 2020-07-25 LAB
ALBUMIN SERPL BCP-MCNC: 3.9 G/DL (ref 3.5–5)
ALP SERPL-CCNC: 72 U/L (ref 46–116)
ALT SERPL W P-5'-P-CCNC: 33 U/L (ref 12–78)
ANION GAP SERPL CALCULATED.3IONS-SCNC: 5 MMOL/L (ref 4–13)
AST SERPL W P-5'-P-CCNC: 16 U/L (ref 5–45)
BILIRUB SERPL-MCNC: 1.5 MG/DL (ref 0.2–1)
BUN SERPL-MCNC: 12 MG/DL (ref 5–25)
CALCIUM SERPL-MCNC: 8.6 MG/DL (ref 8.3–10.1)
CHLORIDE SERPL-SCNC: 102 MMOL/L (ref 100–108)
CHOLEST SERPL-MCNC: 116 MG/DL (ref 50–200)
CO2 SERPL-SCNC: 32 MMOL/L (ref 21–32)
CREAT SERPL-MCNC: 0.95 MG/DL (ref 0.6–1.3)
EST. AVERAGE GLUCOSE BLD GHB EST-MCNC: 148 MG/DL
GFR SERPL CREATININE-BSD FRML MDRD: 89 ML/MIN/1.73SQ M
GLUCOSE P FAST SERPL-MCNC: 197 MG/DL (ref 65–99)
HBA1C MFR BLD: 6.8 %
HDLC SERPL-MCNC: 39 MG/DL
LDLC SERPL CALC-MCNC: 62 MG/DL (ref 0–100)
NONHDLC SERPL-MCNC: 77 MG/DL
POTASSIUM SERPL-SCNC: 4.1 MMOL/L (ref 3.5–5.3)
PROT SERPL-MCNC: 7.1 G/DL (ref 6.4–8.2)
SODIUM SERPL-SCNC: 139 MMOL/L (ref 136–145)
TRIGL SERPL-MCNC: 76 MG/DL

## 2020-07-25 PROCEDURE — 80061 LIPID PANEL: CPT

## 2020-07-25 PROCEDURE — 83036 HEMOGLOBIN GLYCOSYLATED A1C: CPT

## 2020-07-25 PROCEDURE — 36415 COLL VENOUS BLD VENIPUNCTURE: CPT

## 2020-07-25 PROCEDURE — 3044F HG A1C LEVEL LT 7.0%: CPT | Performed by: FAMILY MEDICINE

## 2020-07-25 PROCEDURE — 80053 COMPREHEN METABOLIC PANEL: CPT

## 2020-08-07 DIAGNOSIS — E11.9 TYPE 2 DIABETES MELLITUS WITHOUT COMPLICATION, UNSPECIFIED WHETHER LONG TERM INSULIN USE (HCC): ICD-10-CM

## 2020-08-07 DIAGNOSIS — I63.519 ACUTE ISCHEMIC CEREBROVASCULAR ACCIDENT (CVA) INVOLVING MIDDLE CEREBRAL ARTERY TERRITORY (HCC): ICD-10-CM

## 2020-08-07 DIAGNOSIS — R80.9 CONTROLLED TYPE 2 DIABETES MELLITUS WITH MICROALBUMINURIA, WITHOUT LONG-TERM CURRENT USE OF INSULIN (HCC): ICD-10-CM

## 2020-08-07 DIAGNOSIS — E11.29 CONTROLLED TYPE 2 DIABETES MELLITUS WITH MICROALBUMINURIA, WITHOUT LONG-TERM CURRENT USE OF INSULIN (HCC): ICD-10-CM

## 2020-08-07 DIAGNOSIS — I48.21 PERMANENT ATRIAL FIBRILLATION (HCC): ICD-10-CM

## 2020-08-17 ENCOUNTER — TELEPHONE (OUTPATIENT)
Dept: FAMILY MEDICINE CLINIC | Facility: CLINIC | Age: 55
End: 2020-08-17

## 2020-08-17 DIAGNOSIS — I48.21 PERMANENT ATRIAL FIBRILLATION (HCC): ICD-10-CM

## 2020-08-17 DIAGNOSIS — I63.519 ACUTE ISCHEMIC CEREBROVASCULAR ACCIDENT (CVA) INVOLVING MIDDLE CEREBRAL ARTERY TERRITORY (HCC): ICD-10-CM

## 2020-08-17 DIAGNOSIS — E11.9 TYPE 2 DIABETES MELLITUS WITHOUT COMPLICATION, UNSPECIFIED WHETHER LONG TERM INSULIN USE (HCC): ICD-10-CM

## 2020-08-17 DIAGNOSIS — E11.29 CONTROLLED TYPE 2 DIABETES MELLITUS WITH MICROALBUMINURIA, WITHOUT LONG-TERM CURRENT USE OF INSULIN (HCC): ICD-10-CM

## 2020-08-17 DIAGNOSIS — R80.9 CONTROLLED TYPE 2 DIABETES MELLITUS WITH MICROALBUMINURIA, WITHOUT LONG-TERM CURRENT USE OF INSULIN (HCC): ICD-10-CM

## 2020-08-17 NOTE — TELEPHONE ENCOUNTER
Pt stopped in office requesting refill of Metoprolol to be sent to [facility]

## 2020-08-18 DIAGNOSIS — I63.519 ACUTE ISCHEMIC CEREBROVASCULAR ACCIDENT (CVA) INVOLVING MIDDLE CEREBRAL ARTERY TERRITORY (HCC): ICD-10-CM

## 2020-08-18 DIAGNOSIS — E11.29 CONTROLLED TYPE 2 DIABETES MELLITUS WITH MICROALBUMINURIA, WITHOUT LONG-TERM CURRENT USE OF INSULIN (HCC): ICD-10-CM

## 2020-08-18 DIAGNOSIS — E11.9 TYPE 2 DIABETES MELLITUS WITHOUT COMPLICATION, UNSPECIFIED WHETHER LONG TERM INSULIN USE (HCC): ICD-10-CM

## 2020-08-18 DIAGNOSIS — R80.9 CONTROLLED TYPE 2 DIABETES MELLITUS WITH MICROALBUMINURIA, WITHOUT LONG-TERM CURRENT USE OF INSULIN (HCC): ICD-10-CM

## 2020-08-18 DIAGNOSIS — I48.21 PERMANENT ATRIAL FIBRILLATION (HCC): ICD-10-CM

## 2020-09-01 DIAGNOSIS — I63.519 ACUTE ISCHEMIC CEREBROVASCULAR ACCIDENT (CVA) INVOLVING MIDDLE CEREBRAL ARTERY TERRITORY (HCC): ICD-10-CM

## 2020-09-01 DIAGNOSIS — E11.9 TYPE 2 DIABETES MELLITUS WITHOUT COMPLICATION, UNSPECIFIED WHETHER LONG TERM INSULIN USE (HCC): ICD-10-CM

## 2020-09-01 DIAGNOSIS — E11.29 CONTROLLED TYPE 2 DIABETES MELLITUS WITH MICROALBUMINURIA, WITHOUT LONG-TERM CURRENT USE OF INSULIN (HCC): ICD-10-CM

## 2020-09-01 DIAGNOSIS — R80.9 CONTROLLED TYPE 2 DIABETES MELLITUS WITH MICROALBUMINURIA, WITHOUT LONG-TERM CURRENT USE OF INSULIN (HCC): ICD-10-CM

## 2020-09-01 DIAGNOSIS — I48.21 PERMANENT ATRIAL FIBRILLATION (HCC): ICD-10-CM

## 2020-09-01 PROCEDURE — 3066F NEPHROPATHY DOC TX: CPT | Performed by: FAMILY MEDICINE

## 2020-09-14 ENCOUNTER — OFFICE VISIT (OUTPATIENT)
Dept: FAMILY MEDICINE CLINIC | Facility: CLINIC | Age: 55
End: 2020-09-14
Payer: COMMERCIAL

## 2020-09-14 VITALS
HEART RATE: 80 BPM | DIASTOLIC BLOOD PRESSURE: 70 MMHG | WEIGHT: 219 LBS | SYSTOLIC BLOOD PRESSURE: 132 MMHG | BODY MASS INDEX: 29.66 KG/M2 | TEMPERATURE: 97.6 F | HEIGHT: 72 IN | RESPIRATION RATE: 20 BRPM

## 2020-09-14 DIAGNOSIS — R80.9 CONTROLLED TYPE 2 DIABETES MELLITUS WITH MICROALBUMINURIA, WITHOUT LONG-TERM CURRENT USE OF INSULIN (HCC): ICD-10-CM

## 2020-09-14 DIAGNOSIS — M25.511 ACUTE PAIN OF RIGHT SHOULDER: ICD-10-CM

## 2020-09-14 DIAGNOSIS — E11.29 CONTROLLED TYPE 2 DIABETES MELLITUS WITH MICROALBUMINURIA, WITHOUT LONG-TERM CURRENT USE OF INSULIN (HCC): ICD-10-CM

## 2020-09-14 DIAGNOSIS — B35.6 TINEA CRURIS: Primary | ICD-10-CM

## 2020-09-14 PROCEDURE — 3078F DIAST BP <80 MM HG: CPT | Performed by: NURSE PRACTITIONER

## 2020-09-14 PROCEDURE — 99214 OFFICE O/P EST MOD 30 MIN: CPT | Performed by: NURSE PRACTITIONER

## 2020-09-14 RX ORDER — CLOTRIMAZOLE 1 %
CREAM (GRAM) TOPICAL 2 TIMES DAILY
Qty: 60 G | Refills: 3 | Status: SHIPPED | OUTPATIENT
Start: 2020-09-14 | End: 2021-01-04 | Stop reason: SDUPTHER

## 2020-09-14 NOTE — ASSESSMENT & PLAN NOTE
Lab Results   Component Value Date    HGBA1C 6 8 (H) 07/25/2020       Patient sees Dr Kelly Alas endocrinology  Currently only on metformin  Continue with endocrinology plan of care

## 2020-09-14 NOTE — ASSESSMENT & PLAN NOTE
Certainly patient's diabetes would predispose him to more aggressive fungal infection  For now will have patient do clotrimazole 2 times a day advised patient it may take 2 to 4 weeks for resolution    Patient does have follow-up at the end of October should this persist can evaluate at next office visit

## 2020-09-14 NOTE — ASSESSMENT & PLAN NOTE
Patient currently under workers comp  He states he would like referral to ortho if they decide this was not work related  He can call back for referral to dr Reyes Me

## 2020-09-14 NOTE — PATIENT INSTRUCTIONS
Jock Itch   WHAT YOU NEED TO KNOW:   What is jock itch and what causes it? Jock itch is a rash on your groin  The groin is the area between your abdomen and legs  Jock itch is usually easy to treat and prevent  It is caused by a fungus  The fungus also causes athlete's foot  What increases my risk of jock itch? · Contact: The most common cause of jock itch is contact with something that has the fungus  For example, you touch another person's skin or clothes when you play contact sports  Jock itch is also easily spread among people who live close together, such as in a college dorm  You can also spread the fungus to your groin from your feet if you have athlete's foot  · Moisture: The fungus that causes jock itch multiplies quickly in warm, moist areas  The fungus can grow in the sweat collected in the folds of your skin  You can get jock itch when your clothes are wet or too tight  For example, you wear tight pants or leave a wet bathing suit on  You can get jock itch if you are in a warm and humid climate  · Medical conditions: You are at a higher risk of jock itch if you are overweight  It may be hard to prevent jock itch if you have a weak immune system  Diabetes (high blood sugar) can also put you at risk of jock itch  · Gender: You are more likely to get jock itch if you are male  What are the signs and symptoms of jock itch? Jock itch is a reddish-brown rash with round lesions that can spread from your groin to your thighs and buttocks  You may see a red ring with raised edges  You may see flakes of skin on the rash  The rash may burn, itch, or be painful  How is jock itch diagnosed? Your healthcare provider will ask about your signs and symptoms and examine you  He may ask if you have any medical conditions, such as diabetes  Your healthcare provider may ask if you play sports  He may ask if you wear tight clothes or leave wet clothes on for long periods   He will check your groin and your feet for a rash  Your healthcare provider may gently scrape off some of your skin with a special tool  An exam of the skin rash can help your healthcare provider diagnose jock itch  How is jock itch treated? Jock itch is usually treated with a cream that kills the fungus  Apply the cream to the rash and the skin around it as directed  You may need to apply the cream 1 to 2 times each day for 2 weeks  You may be given this medicine as a pill if the cream does not help  What are the risks of jock itch? You may get a headache or rash somewhere else on your body from the medicine used to treat jock itch   The medicines may cause stomach pain, nausea, vomiting, or diarrhea  Without treatment, your jock itch could become severe  You might have to take more than one kind of medicine to treat a severe rash  You may get jock itch again, even after treatment  What can I do to manage and prevent jock itch? · Keep the area dry  · Wear light, loose clothes  Do not share clothes  · Do not wear wet clothes for long periods  Wash athletic gear after you play sports  · Bathe daily  Dry your skin completely after you bathe  Apply cream or powder after you bathe as directed if you get jock itch often  Wash your hands often to prevent the spread of the fungus  You may want to wear disposable gloves when you clean your feet  The gloves will keep the fungus from moving from your feet to your hands  · Use separate towels to dry each part of your body  Put your socks on before you put on your underwear so you do not spread the fungus from your feet to your groin  · Lose weight if you are overweight  When should I contact my healthcare provider? · Your signs and symptoms do not get better within 2 weeks of treatment  · Your signs and symptoms get worse or come back after treatment  · You get a rash on a part of your body other than your groin  · You have a fever      · You have questions or concerns about your condition or care  CARE AGREEMENT:   You have the right to help plan your care  Learn about your health condition and how it may be treated  Discuss treatment options with your caregivers to decide what care you want to receive  You always have the right to refuse treatment  The above information is an  only  It is not intended as medical advice for individual conditions or treatments  Talk to your doctor, nurse or pharmacist before following any medical regimen to see if it is safe and effective for you  © 2017 [address]  Information is for End User's use only and may not be sold, redistributed or otherwise used for commercial purposes  All illustrations and images included in CareNotes® are the copyrighted property of [employer]  or Rob Rich

## 2020-09-14 NOTE — PROGRESS NOTES
Assessment and Plan:    Problem List Items Addressed This Visit        Endocrine    Controlled type 2 diabetes mellitus with microalbuminuria, without long-term current use of insulin (HCC)       Lab Results   Component Value Date    HGBA1C 6 8 (H) 07/25/2020       Patient sees Dr Mali Guerrero endocrinology  Currently only on metformin  Continue with endocrinology plan of care  Musculoskeletal and Integument    Tinea cruris - Primary     Certainly patient's diabetes would predispose him to more aggressive fungal infection  For now will have patient do clotrimazole 2 times a day advised patient it may take 2 to 4 weeks for resolution  Patient does have follow-up at the end of October should this persist can evaluate at next office visit         Relevant Medications    clotrimazole (LOTRIMIN) 1 % cream       Other    Acute pain of right shoulder     Patient currently under workers comp  He states he would like referral to ortho if they decide this was not work related  He can call back for referral to dr Shayna Nye  Diagnoses and all orders for this visit:    Tinea cruris  -     clotrimazole (LOTRIMIN) 1 % cream; Apply topically 2 (two) times a day Continue for 2-4 weeks  Controlled type 2 diabetes mellitus with microalbuminuria, without long-term current use of insulin (HCC)    Acute pain of right shoulder              Subjective:      Patient ID: Andrea Estes is a 63 y o  male  CC:    Chief Complaint   Patient presents with    Rash     patient present today for a rash on both upper legs for the past week  HPI:    Rash   This is a new problem  The current episode started in the past 7 days  The affected locations include the left upper leg, right upper leg and groin  He was exposed to nothing  Pertinent negatives include no cough, fatigue, fever or shortness of breath  Past treatments include nothing  The treatment provided no relief         The following portions of the patient's history were reviewed and updated as appropriate: allergies, current medications, past family history, past medical history, past social history, past surgical history and problem list       Review of Systems   Constitutional: Negative for chills, diaphoresis, fatigue and fever  Respiratory: Negative for cough, chest tightness and shortness of breath  Cardiovascular: Negative for chest pain and palpitations  Skin: Positive for rash  Neurological: Negative for numbness  Data to review:       Objective:    Vitals:    09/14/20 1608   BP: 132/70   BP Location: Left arm   Patient Position: Sitting   Cuff Size: Large   Pulse: 80   Resp: 20   Temp: 97 6 °F (36 4 °C)   TempSrc: Temporal   Weight: 99 3 kg (219 lb)   Height: 6' (1 829 m)        Physical Exam  Vitals signs and nursing note reviewed  Constitutional:       Appearance: He is not ill-appearing  HENT:      Head: Normocephalic and atraumatic  Right Ear: Tympanic membrane normal       Left Ear: Tympanic membrane normal    Eyes:      Extraocular Movements: Extraocular movements intact  Conjunctiva/sclera: Conjunctivae normal    Neck:      Thyroid: No thyromegaly  Vascular: No carotid bruit or JVD  Cardiovascular:      Rate and Rhythm: Normal rate  Rhythm irregularly irregular  Heart sounds: S1 normal and S2 normal  No murmur  Pulmonary:      Effort: Pulmonary effort is normal  No respiratory distress  Breath sounds: Normal breath sounds and air entry  No wheezing  Abdominal:      General: Bowel sounds are normal  There is no distension  Palpations: Abdomen is soft  Musculoskeletal:      Right shoulder: He exhibits crepitus (Slight as well as popping with adduction) and pain (mild with empty can test)  He exhibits normal range of motion, no tenderness, normal pulse and normal strength  Right lower leg: No edema  Left lower leg: No edema        Comments: Empty can test negative    Skin: Coloration: Skin is not pale  Findings: Rash present  No erythema  Rash is macular  Comments: Macular rash to both groin areas consistent with fungal dermatitis  Neurological:      Mental Status: He is alert and oriented to person, place, and time  Psychiatric:         Mood and Affect: Mood normal          Behavior: Behavior normal          Thought Content:  Thought content normal          Judgment: Judgment normal

## 2020-09-16 ENCOUNTER — TELEPHONE (OUTPATIENT)
Dept: FAMILY MEDICINE CLINIC | Facility: CLINIC | Age: 55
End: 2020-09-16

## 2020-09-16 DIAGNOSIS — M25.511 ACUTE PAIN OF RIGHT SHOULDER: Primary | ICD-10-CM

## 2020-09-16 NOTE — TELEPHONE ENCOUNTER
Pt came in because he will like a referral to see ortho doctor as he discussed with Judah Gomez on his appt  Please put in ref to see Ortho DR KATIE ramos please    Call pt once its complete so he can make an appt

## 2020-09-29 DIAGNOSIS — I63.519 ACUTE ISCHEMIC CEREBROVASCULAR ACCIDENT (CVA) INVOLVING MIDDLE CEREBRAL ARTERY TERRITORY (HCC): ICD-10-CM

## 2020-09-29 DIAGNOSIS — I48.21 PERMANENT ATRIAL FIBRILLATION (HCC): ICD-10-CM

## 2020-09-29 DIAGNOSIS — E11.9 TYPE 2 DIABETES MELLITUS WITHOUT COMPLICATION, UNSPECIFIED WHETHER LONG TERM INSULIN USE (HCC): ICD-10-CM

## 2020-09-29 DIAGNOSIS — R80.9 CONTROLLED TYPE 2 DIABETES MELLITUS WITH MICROALBUMINURIA, WITHOUT LONG-TERM CURRENT USE OF INSULIN (HCC): ICD-10-CM

## 2020-09-29 DIAGNOSIS — E11.29 CONTROLLED TYPE 2 DIABETES MELLITUS WITH MICROALBUMINURIA, WITHOUT LONG-TERM CURRENT USE OF INSULIN (HCC): ICD-10-CM

## 2020-10-05 ENCOUNTER — TRANSCRIBE ORDERS (OUTPATIENT)
Dept: FAMILY MEDICINE CLINIC | Facility: CLINIC | Age: 55
End: 2020-10-05

## 2020-10-05 DIAGNOSIS — M25.511 PAIN IN RIGHT SHOULDER: Primary | ICD-10-CM

## 2020-10-15 DIAGNOSIS — E11.9 TYPE 2 DIABETES MELLITUS WITHOUT COMPLICATION, UNSPECIFIED WHETHER LONG TERM INSULIN USE (HCC): ICD-10-CM

## 2020-10-15 DIAGNOSIS — R80.9 CONTROLLED TYPE 2 DIABETES MELLITUS WITH MICROALBUMINURIA, WITHOUT LONG-TERM CURRENT USE OF INSULIN (HCC): ICD-10-CM

## 2020-10-15 DIAGNOSIS — I48.21 PERMANENT ATRIAL FIBRILLATION (HCC): ICD-10-CM

## 2020-10-15 DIAGNOSIS — E11.29 CONTROLLED TYPE 2 DIABETES MELLITUS WITH MICROALBUMINURIA, WITHOUT LONG-TERM CURRENT USE OF INSULIN (HCC): ICD-10-CM

## 2020-10-15 DIAGNOSIS — I63.519 ACUTE ISCHEMIC CEREBROVASCULAR ACCIDENT (CVA) INVOLVING MIDDLE CEREBRAL ARTERY TERRITORY (HCC): ICD-10-CM

## 2020-10-15 RX ORDER — ATORVASTATIN CALCIUM 40 MG/1
TABLET, FILM COATED ORAL
Qty: 90 TABLET | Refills: 0 | Status: SHIPPED | OUTPATIENT
Start: 2020-10-15 | End: 2021-01-04 | Stop reason: SDUPTHER

## 2020-10-19 ENCOUNTER — OFFICE VISIT (OUTPATIENT)
Dept: FAMILY MEDICINE CLINIC | Facility: CLINIC | Age: 55
End: 2020-10-19
Payer: COMMERCIAL

## 2020-10-19 VITALS
HEART RATE: 96 BPM | DIASTOLIC BLOOD PRESSURE: 70 MMHG | TEMPERATURE: 97.3 F | HEIGHT: 72 IN | WEIGHT: 221.4 LBS | BODY MASS INDEX: 29.99 KG/M2 | SYSTOLIC BLOOD PRESSURE: 130 MMHG

## 2020-10-19 DIAGNOSIS — I63.10 CEREBROVASCULAR ACCIDENT (CVA) DUE TO EMBOLISM OF PRECEREBRAL ARTERY (HCC): ICD-10-CM

## 2020-10-19 DIAGNOSIS — I48.21 PERMANENT ATRIAL FIBRILLATION (HCC): ICD-10-CM

## 2020-10-19 DIAGNOSIS — M50.90 CERVICAL DISC DISEASE: ICD-10-CM

## 2020-10-19 DIAGNOSIS — R80.9 CONTROLLED TYPE 2 DIABETES MELLITUS WITH MICROALBUMINURIA, WITHOUT LONG-TERM CURRENT USE OF INSULIN (HCC): Primary | ICD-10-CM

## 2020-10-19 DIAGNOSIS — K74.69 OTHER CIRRHOSIS OF LIVER (HCC): ICD-10-CM

## 2020-10-19 DIAGNOSIS — Z72.0 TOBACCO ABUSE: ICD-10-CM

## 2020-10-19 DIAGNOSIS — Z11.4 SCREENING FOR HIV (HUMAN IMMUNODEFICIENCY VIRUS): ICD-10-CM

## 2020-10-19 DIAGNOSIS — E11.29 CONTROLLED TYPE 2 DIABETES MELLITUS WITH MICROALBUMINURIA, WITHOUT LONG-TERM CURRENT USE OF INSULIN (HCC): Primary | ICD-10-CM

## 2020-10-19 DIAGNOSIS — L30.9 HAND DERMATITIS: ICD-10-CM

## 2020-10-19 DIAGNOSIS — R35.1 NOCTURIA: ICD-10-CM

## 2020-10-19 DIAGNOSIS — B18.2 CHRONIC HEPATITIS C WITHOUT HEPATIC COMA (HCC): ICD-10-CM

## 2020-10-19 DIAGNOSIS — I10 ESSENTIAL HYPERTENSION: ICD-10-CM

## 2020-10-19 DIAGNOSIS — R80.9 MICROALBUMINURIA: ICD-10-CM

## 2020-10-19 DIAGNOSIS — Z23 ENCOUNTER FOR IMMUNIZATION: ICD-10-CM

## 2020-10-19 DIAGNOSIS — D69.6 THROMBOCYTOPENIA (HCC): ICD-10-CM

## 2020-10-19 DIAGNOSIS — K63.5 POLYP OF COLON, UNSPECIFIED PART OF COLON, UNSPECIFIED TYPE: ICD-10-CM

## 2020-10-19 PROCEDURE — 3725F SCREEN DEPRESSION PERFORMED: CPT | Performed by: FAMILY MEDICINE

## 2020-10-19 PROCEDURE — 1036F TOBACCO NON-USER: CPT | Performed by: FAMILY MEDICINE

## 2020-10-19 PROCEDURE — 99214 OFFICE O/P EST MOD 30 MIN: CPT | Performed by: FAMILY MEDICINE

## 2020-10-20 ENCOUNTER — TELEPHONE (OUTPATIENT)
Dept: PHYSICAL THERAPY | Facility: OTHER | Age: 55
End: 2020-10-20

## 2020-10-21 ENCOUNTER — NURSE TRIAGE (OUTPATIENT)
Dept: PHYSICAL THERAPY | Facility: OTHER | Age: 55
End: 2020-10-21

## 2020-10-21 DIAGNOSIS — R80.9 CONTROLLED TYPE 2 DIABETES MELLITUS WITH MICROALBUMINURIA, WITHOUT LONG-TERM CURRENT USE OF INSULIN (HCC): ICD-10-CM

## 2020-10-21 DIAGNOSIS — I63.519 ACUTE ISCHEMIC CEREBROVASCULAR ACCIDENT (CVA) INVOLVING MIDDLE CEREBRAL ARTERY TERRITORY (HCC): ICD-10-CM

## 2020-10-21 DIAGNOSIS — I48.21 PERMANENT ATRIAL FIBRILLATION (HCC): ICD-10-CM

## 2020-10-21 DIAGNOSIS — E11.9 TYPE 2 DIABETES MELLITUS WITHOUT COMPLICATION, UNSPECIFIED WHETHER LONG TERM INSULIN USE (HCC): ICD-10-CM

## 2020-10-21 DIAGNOSIS — M54.2 ACUTE NECK PAIN: Primary | ICD-10-CM

## 2020-10-21 DIAGNOSIS — E11.29 CONTROLLED TYPE 2 DIABETES MELLITUS WITH MICROALBUMINURIA, WITHOUT LONG-TERM CURRENT USE OF INSULIN (HCC): ICD-10-CM

## 2020-10-26 ENCOUNTER — EVALUATION (OUTPATIENT)
Dept: PHYSICAL THERAPY | Facility: CLINIC | Age: 55
End: 2020-10-26
Payer: COMMERCIAL

## 2020-10-26 VITALS — HEART RATE: 99 BPM | SYSTOLIC BLOOD PRESSURE: 140 MMHG | DIASTOLIC BLOOD PRESSURE: 78 MMHG | TEMPERATURE: 98.6 F

## 2020-10-26 DIAGNOSIS — M54.2 ACUTE NECK PAIN: ICD-10-CM

## 2020-10-26 PROCEDURE — 97161 PT EVAL LOW COMPLEX 20 MIN: CPT | Performed by: PHYSICAL THERAPIST

## 2020-10-26 PROCEDURE — 97012 MECHANICAL TRACTION THERAPY: CPT | Performed by: PHYSICAL THERAPIST

## 2020-10-28 ENCOUNTER — LAB (OUTPATIENT)
Dept: LAB | Facility: MEDICAL CENTER | Age: 55
End: 2020-10-28
Payer: COMMERCIAL

## 2020-10-28 ENCOUNTER — OFFICE VISIT (OUTPATIENT)
Dept: PHYSICAL THERAPY | Facility: CLINIC | Age: 55
End: 2020-10-28
Payer: COMMERCIAL

## 2020-10-28 DIAGNOSIS — Z72.0 TOBACCO ABUSE: ICD-10-CM

## 2020-10-28 DIAGNOSIS — R35.1 NOCTURIA: ICD-10-CM

## 2020-10-28 DIAGNOSIS — I63.10 CEREBROVASCULAR ACCIDENT (CVA) DUE TO EMBOLISM OF PRECEREBRAL ARTERY (HCC): ICD-10-CM

## 2020-10-28 DIAGNOSIS — B18.2 CHRONIC HEPATITIS C WITHOUT HEPATIC COMA (HCC): ICD-10-CM

## 2020-10-28 DIAGNOSIS — K63.5 POLYP OF COLON, UNSPECIFIED PART OF COLON, UNSPECIFIED TYPE: ICD-10-CM

## 2020-10-28 DIAGNOSIS — M50.90 CERVICAL DISC DISEASE: ICD-10-CM

## 2020-10-28 DIAGNOSIS — R80.9 CONTROLLED TYPE 2 DIABETES MELLITUS WITH MICROALBUMINURIA, WITHOUT LONG-TERM CURRENT USE OF INSULIN (HCC): ICD-10-CM

## 2020-10-28 DIAGNOSIS — D69.6 THROMBOCYTOPENIA (HCC): ICD-10-CM

## 2020-10-28 DIAGNOSIS — R80.9 MICROALBUMINURIA: ICD-10-CM

## 2020-10-28 DIAGNOSIS — K74.69 OTHER CIRRHOSIS OF LIVER (HCC): ICD-10-CM

## 2020-10-28 DIAGNOSIS — M54.2 ACUTE NECK PAIN: Primary | ICD-10-CM

## 2020-10-28 DIAGNOSIS — Z23 ENCOUNTER FOR IMMUNIZATION: ICD-10-CM

## 2020-10-28 DIAGNOSIS — E11.29 CONTROLLED TYPE 2 DIABETES MELLITUS WITH MICROALBUMINURIA, WITHOUT LONG-TERM CURRENT USE OF INSULIN (HCC): ICD-10-CM

## 2020-10-28 DIAGNOSIS — I48.21 PERMANENT ATRIAL FIBRILLATION (HCC): ICD-10-CM

## 2020-10-28 DIAGNOSIS — L30.9 HAND DERMATITIS: ICD-10-CM

## 2020-10-28 LAB
ALBUMIN SERPL BCP-MCNC: 4 G/DL (ref 3.5–5)
ALP SERPL-CCNC: 65 U/L (ref 46–116)
ALT SERPL W P-5'-P-CCNC: 30 U/L (ref 12–78)
ANION GAP SERPL CALCULATED.3IONS-SCNC: 5 MMOL/L (ref 4–13)
AST SERPL W P-5'-P-CCNC: 17 U/L (ref 5–45)
BILIRUB SERPL-MCNC: 1.42 MG/DL (ref 0.2–1)
BUN SERPL-MCNC: 20 MG/DL (ref 5–25)
CALCIUM SERPL-MCNC: 8.6 MG/DL (ref 8.3–10.1)
CHLORIDE SERPL-SCNC: 103 MMOL/L (ref 100–108)
CHOLEST SERPL-MCNC: 115 MG/DL (ref 50–200)
CO2 SERPL-SCNC: 30 MMOL/L (ref 21–32)
CREAT SERPL-MCNC: 1 MG/DL (ref 0.6–1.3)
CREAT UR-MCNC: 84 MG/DL
EST. AVERAGE GLUCOSE BLD GHB EST-MCNC: 154 MG/DL
GFR SERPL CREATININE-BSD FRML MDRD: 84 ML/MIN/1.73SQ M
GLUCOSE P FAST SERPL-MCNC: 220 MG/DL (ref 65–99)
HBA1C MFR BLD: 7 %
HDLC SERPL-MCNC: 34 MG/DL
LDLC SERPL CALC-MCNC: 52 MG/DL (ref 0–100)
MICROALBUMIN UR-MCNC: 255 MG/L (ref 0–20)
MICROALBUMIN/CREAT 24H UR: 304 MG/G CREATININE (ref 0–30)
POTASSIUM SERPL-SCNC: 4.3 MMOL/L (ref 3.5–5.3)
PROT SERPL-MCNC: 6.9 G/DL (ref 6.4–8.2)
PSA SERPL-MCNC: 0.2 NG/ML (ref 0–4)
SODIUM SERPL-SCNC: 138 MMOL/L (ref 136–145)
TRIGL SERPL-MCNC: 145 MG/DL

## 2020-10-28 PROCEDURE — 3051F HG A1C>EQUAL 7.0%<8.0%: CPT | Performed by: PHYSICAL MEDICINE & REHABILITATION

## 2020-10-28 PROCEDURE — 80053 COMPREHEN METABOLIC PANEL: CPT

## 2020-10-28 PROCEDURE — 97012 MECHANICAL TRACTION THERAPY: CPT | Performed by: PHYSICAL THERAPIST

## 2020-10-28 PROCEDURE — 97140 MANUAL THERAPY 1/> REGIONS: CPT | Performed by: PHYSICAL THERAPIST

## 2020-10-28 PROCEDURE — 84153 ASSAY OF PSA TOTAL: CPT

## 2020-10-28 PROCEDURE — 82570 ASSAY OF URINE CREATININE: CPT

## 2020-10-28 PROCEDURE — 82043 UR ALBUMIN QUANTITATIVE: CPT

## 2020-10-28 PROCEDURE — 83036 HEMOGLOBIN GLYCOSYLATED A1C: CPT

## 2020-10-28 PROCEDURE — 3066F NEPHROPATHY DOC TX: CPT | Performed by: PHYSICAL MEDICINE & REHABILITATION

## 2020-10-28 PROCEDURE — 3062F POS MACROALBUMINURIA REV: CPT | Performed by: FAMILY MEDICINE

## 2020-10-28 PROCEDURE — 36415 COLL VENOUS BLD VENIPUNCTURE: CPT

## 2020-10-28 PROCEDURE — 97110 THERAPEUTIC EXERCISES: CPT | Performed by: PHYSICAL THERAPIST

## 2020-10-28 PROCEDURE — 80061 LIPID PANEL: CPT

## 2020-10-29 DIAGNOSIS — I48.21 PERMANENT ATRIAL FIBRILLATION (HCC): ICD-10-CM

## 2020-10-29 RX ORDER — DIGOXIN 250 MCG
TABLET ORAL
Qty: 90 TABLET | Refills: 3 | Status: SHIPPED | OUTPATIENT
Start: 2020-10-29 | End: 2021-04-09

## 2020-11-02 ENCOUNTER — OFFICE VISIT (OUTPATIENT)
Dept: PHYSICAL THERAPY | Facility: CLINIC | Age: 55
End: 2020-11-02
Payer: COMMERCIAL

## 2020-11-02 DIAGNOSIS — M54.2 ACUTE NECK PAIN: Primary | ICD-10-CM

## 2020-11-02 PROCEDURE — 97140 MANUAL THERAPY 1/> REGIONS: CPT | Performed by: PHYSICAL THERAPIST

## 2020-11-02 PROCEDURE — 97110 THERAPEUTIC EXERCISES: CPT | Performed by: PHYSICAL THERAPIST

## 2020-11-02 PROCEDURE — 97012 MECHANICAL TRACTION THERAPY: CPT | Performed by: PHYSICAL THERAPIST

## 2020-11-04 ENCOUNTER — OFFICE VISIT (OUTPATIENT)
Dept: PHYSICAL THERAPY | Facility: CLINIC | Age: 55
End: 2020-11-04
Payer: COMMERCIAL

## 2020-11-04 DIAGNOSIS — M54.2 ACUTE NECK PAIN: Primary | ICD-10-CM

## 2020-11-04 PROCEDURE — 97110 THERAPEUTIC EXERCISES: CPT | Performed by: PHYSICAL THERAPIST

## 2020-11-04 PROCEDURE — 97140 MANUAL THERAPY 1/> REGIONS: CPT | Performed by: PHYSICAL THERAPIST

## 2020-11-04 PROCEDURE — 97012 MECHANICAL TRACTION THERAPY: CPT | Performed by: PHYSICAL THERAPIST

## 2020-11-05 ENCOUNTER — OFFICE VISIT (OUTPATIENT)
Dept: FAMILY MEDICINE CLINIC | Facility: CLINIC | Age: 55
End: 2020-11-05
Payer: COMMERCIAL

## 2020-11-05 VITALS
HEART RATE: 80 BPM | TEMPERATURE: 98.2 F | SYSTOLIC BLOOD PRESSURE: 146 MMHG | DIASTOLIC BLOOD PRESSURE: 70 MMHG | HEIGHT: 72 IN | BODY MASS INDEX: 30.2 KG/M2 | WEIGHT: 223 LBS

## 2020-11-05 DIAGNOSIS — N52.1 ERECTILE DYSFUNCTION DUE TO DISEASES CLASSIFIED ELSEWHERE: ICD-10-CM

## 2020-11-05 DIAGNOSIS — E78.2 MIXED HYPERLIPIDEMIA: ICD-10-CM

## 2020-11-05 DIAGNOSIS — I10 ESSENTIAL HYPERTENSION: ICD-10-CM

## 2020-11-05 DIAGNOSIS — I48.21 PERMANENT ATRIAL FIBRILLATION (HCC): ICD-10-CM

## 2020-11-05 DIAGNOSIS — R80.9 CONTROLLED TYPE 2 DIABETES MELLITUS WITH MICROALBUMINURIA, WITHOUT LONG-TERM CURRENT USE OF INSULIN (HCC): ICD-10-CM

## 2020-11-05 DIAGNOSIS — E11.29 CONTROLLED TYPE 2 DIABETES MELLITUS WITH MICROALBUMINURIA, WITHOUT LONG-TERM CURRENT USE OF INSULIN (HCC): ICD-10-CM

## 2020-11-05 DIAGNOSIS — D69.6 THROMBOCYTOPENIA (HCC): ICD-10-CM

## 2020-11-05 DIAGNOSIS — M50.90 CERVICAL DISC DISEASE: Primary | ICD-10-CM

## 2020-11-05 DIAGNOSIS — Z23 NEED FOR INFLUENZA VACCINATION: ICD-10-CM

## 2020-11-05 PROBLEM — E78.5 HYPERLIPIDEMIA: Status: ACTIVE | Noted: 2020-11-05

## 2020-11-05 PROBLEM — E66.9 OBESITY: Status: ACTIVE | Noted: 2020-04-10

## 2020-11-05 PROCEDURE — 90471 IMMUNIZATION ADMIN: CPT | Performed by: FAMILY MEDICINE

## 2020-11-05 PROCEDURE — 99214 OFFICE O/P EST MOD 30 MIN: CPT | Performed by: FAMILY MEDICINE

## 2020-11-05 PROCEDURE — 90682 RIV4 VACC RECOMBINANT DNA IM: CPT | Performed by: FAMILY MEDICINE

## 2020-11-05 RX ORDER — SILDENAFIL 50 MG/1
50 TABLET, FILM COATED ORAL DAILY PRN
Qty: 10 TABLET | Refills: 0 | Status: SHIPPED | OUTPATIENT
Start: 2020-11-05 | End: 2021-04-26 | Stop reason: SDUPTHER

## 2020-11-08 DIAGNOSIS — R80.9 CONTROLLED TYPE 2 DIABETES MELLITUS WITH MICROALBUMINURIA, WITHOUT LONG-TERM CURRENT USE OF INSULIN (HCC): ICD-10-CM

## 2020-11-08 DIAGNOSIS — E11.9 TYPE 2 DIABETES MELLITUS WITHOUT COMPLICATION, UNSPECIFIED WHETHER LONG TERM INSULIN USE (HCC): ICD-10-CM

## 2020-11-08 DIAGNOSIS — I48.21 PERMANENT ATRIAL FIBRILLATION (HCC): ICD-10-CM

## 2020-11-08 DIAGNOSIS — E11.29 CONTROLLED TYPE 2 DIABETES MELLITUS WITH MICROALBUMINURIA, WITHOUT LONG-TERM CURRENT USE OF INSULIN (HCC): ICD-10-CM

## 2020-11-08 DIAGNOSIS — I63.519 ACUTE ISCHEMIC CEREBROVASCULAR ACCIDENT (CVA) INVOLVING MIDDLE CEREBRAL ARTERY TERRITORY (HCC): ICD-10-CM

## 2020-11-09 ENCOUNTER — TELEPHONE (OUTPATIENT)
Dept: PSYCHIATRY | Facility: CLINIC | Age: 55
End: 2020-11-09

## 2020-11-10 ENCOUNTER — OFFICE VISIT (OUTPATIENT)
Dept: PHYSICAL THERAPY | Facility: CLINIC | Age: 55
End: 2020-11-10
Payer: COMMERCIAL

## 2020-11-10 DIAGNOSIS — M54.2 ACUTE NECK PAIN: Primary | ICD-10-CM

## 2020-11-10 PROCEDURE — 97012 MECHANICAL TRACTION THERAPY: CPT | Performed by: PHYSICAL THERAPIST

## 2020-11-10 PROCEDURE — 97110 THERAPEUTIC EXERCISES: CPT | Performed by: PHYSICAL THERAPIST

## 2020-11-10 PROCEDURE — 97140 MANUAL THERAPY 1/> REGIONS: CPT | Performed by: PHYSICAL THERAPIST

## 2020-11-12 ENCOUNTER — OFFICE VISIT (OUTPATIENT)
Dept: PHYSICAL THERAPY | Facility: CLINIC | Age: 55
End: 2020-11-12
Payer: COMMERCIAL

## 2020-11-12 DIAGNOSIS — M54.2 ACUTE NECK PAIN: Primary | ICD-10-CM

## 2020-11-12 PROCEDURE — 97110 THERAPEUTIC EXERCISES: CPT | Performed by: PHYSICAL THERAPIST

## 2020-11-12 PROCEDURE — 97140 MANUAL THERAPY 1/> REGIONS: CPT | Performed by: PHYSICAL THERAPIST

## 2020-11-12 PROCEDURE — 97012 MECHANICAL TRACTION THERAPY: CPT | Performed by: PHYSICAL THERAPIST

## 2020-11-17 ENCOUNTER — OFFICE VISIT (OUTPATIENT)
Dept: PHYSICAL THERAPY | Facility: CLINIC | Age: 55
End: 2020-11-17
Payer: COMMERCIAL

## 2020-11-17 DIAGNOSIS — M54.2 ACUTE NECK PAIN: Primary | ICD-10-CM

## 2020-11-17 PROCEDURE — 97110 THERAPEUTIC EXERCISES: CPT | Performed by: PHYSICAL THERAPIST

## 2020-11-17 PROCEDURE — 97012 MECHANICAL TRACTION THERAPY: CPT | Performed by: PHYSICAL THERAPIST

## 2020-11-17 PROCEDURE — 97140 MANUAL THERAPY 1/> REGIONS: CPT | Performed by: PHYSICAL THERAPIST

## 2020-11-19 ENCOUNTER — OFFICE VISIT (OUTPATIENT)
Dept: PHYSICAL THERAPY | Facility: CLINIC | Age: 55
End: 2020-11-19
Payer: COMMERCIAL

## 2020-11-19 DIAGNOSIS — M54.2 ACUTE NECK PAIN: Primary | ICD-10-CM

## 2020-11-19 PROCEDURE — 97012 MECHANICAL TRACTION THERAPY: CPT | Performed by: PHYSICAL THERAPIST

## 2020-11-19 PROCEDURE — 97140 MANUAL THERAPY 1/> REGIONS: CPT | Performed by: PHYSICAL THERAPIST

## 2020-11-19 PROCEDURE — 97110 THERAPEUTIC EXERCISES: CPT | Performed by: PHYSICAL THERAPIST

## 2020-11-23 ENCOUNTER — OFFICE VISIT (OUTPATIENT)
Dept: FAMILY MEDICINE CLINIC | Facility: CLINIC | Age: 55
End: 2020-11-23
Payer: COMMERCIAL

## 2020-11-23 ENCOUNTER — TELEPHONE (OUTPATIENT)
Dept: PAIN MEDICINE | Facility: MEDICAL CENTER | Age: 55
End: 2020-11-23

## 2020-11-23 ENCOUNTER — OFFICE VISIT (OUTPATIENT)
Dept: PHYSICAL THERAPY | Facility: CLINIC | Age: 55
End: 2020-11-23
Payer: COMMERCIAL

## 2020-11-23 ENCOUNTER — CONSULT (OUTPATIENT)
Dept: PAIN MEDICINE | Facility: MEDICAL CENTER | Age: 55
End: 2020-11-23
Payer: COMMERCIAL

## 2020-11-23 VITALS
HEART RATE: 85 BPM | BODY MASS INDEX: 29.26 KG/M2 | DIASTOLIC BLOOD PRESSURE: 93 MMHG | RESPIRATION RATE: 16 BRPM | SYSTOLIC BLOOD PRESSURE: 147 MMHG | HEIGHT: 72 IN | WEIGHT: 216 LBS | TEMPERATURE: 97.8 F

## 2020-11-23 VITALS
DIASTOLIC BLOOD PRESSURE: 74 MMHG | WEIGHT: 221 LBS | TEMPERATURE: 97.5 F | HEIGHT: 72 IN | HEART RATE: 68 BPM | SYSTOLIC BLOOD PRESSURE: 148 MMHG | BODY MASS INDEX: 29.93 KG/M2

## 2020-11-23 DIAGNOSIS — I48.21 PERMANENT ATRIAL FIBRILLATION (HCC): ICD-10-CM

## 2020-11-23 DIAGNOSIS — E11.22 TYPE 2 DIABETES MELLITUS WITH CHRONIC KIDNEY DISEASE, WITHOUT LONG-TERM CURRENT USE OF INSULIN, UNSPECIFIED CKD STAGE (HCC): ICD-10-CM

## 2020-11-23 DIAGNOSIS — M54.2 ACUTE NECK PAIN: Primary | ICD-10-CM

## 2020-11-23 DIAGNOSIS — M50.90 CERVICAL DISC DISEASE: Primary | ICD-10-CM

## 2020-11-23 DIAGNOSIS — N52.1 ERECTILE DYSFUNCTION DUE TO DISEASES CLASSIFIED ELSEWHERE: ICD-10-CM

## 2020-11-23 DIAGNOSIS — M50.90 CERVICAL DISC DISEASE: ICD-10-CM

## 2020-11-23 DIAGNOSIS — M54.12 CERVICAL RADICULITIS: Primary | ICD-10-CM

## 2020-11-23 DIAGNOSIS — I10 ESSENTIAL HYPERTENSION: ICD-10-CM

## 2020-11-23 PROBLEM — E11.9 TYPE 2 DIABETES MELLITUS (HCC): Status: ACTIVE | Noted: 2017-04-08

## 2020-11-23 PROCEDURE — 3008F BODY MASS INDEX DOCD: CPT | Performed by: PHYSICAL MEDICINE & REHABILITATION

## 2020-11-23 PROCEDURE — 3078F DIAST BP <80 MM HG: CPT | Performed by: FAMILY MEDICINE

## 2020-11-23 PROCEDURE — 97012 MECHANICAL TRACTION THERAPY: CPT | Performed by: PHYSICAL THERAPIST

## 2020-11-23 PROCEDURE — 99244 OFF/OP CNSLTJ NEW/EST MOD 40: CPT | Performed by: PHYSICAL MEDICINE & REHABILITATION

## 2020-11-23 PROCEDURE — 3077F SYST BP >= 140 MM HG: CPT | Performed by: FAMILY MEDICINE

## 2020-11-23 PROCEDURE — 1036F TOBACCO NON-USER: CPT | Performed by: PHYSICAL MEDICINE & REHABILITATION

## 2020-11-23 PROCEDURE — 97110 THERAPEUTIC EXERCISES: CPT | Performed by: PHYSICAL THERAPIST

## 2020-11-23 PROCEDURE — 97140 MANUAL THERAPY 1/> REGIONS: CPT | Performed by: PHYSICAL THERAPIST

## 2020-11-23 PROCEDURE — 99214 OFFICE O/P EST MOD 30 MIN: CPT | Performed by: FAMILY MEDICINE

## 2020-11-27 ENCOUNTER — OFFICE VISIT (OUTPATIENT)
Dept: PHYSICAL THERAPY | Facility: CLINIC | Age: 55
End: 2020-11-27
Payer: COMMERCIAL

## 2020-11-27 DIAGNOSIS — M54.2 ACUTE NECK PAIN: Primary | ICD-10-CM

## 2020-11-27 PROCEDURE — 97012 MECHANICAL TRACTION THERAPY: CPT | Performed by: PHYSICAL THERAPIST

## 2020-11-27 PROCEDURE — 97110 THERAPEUTIC EXERCISES: CPT | Performed by: PHYSICAL THERAPIST

## 2020-11-27 PROCEDURE — 97140 MANUAL THERAPY 1/> REGIONS: CPT | Performed by: PHYSICAL THERAPIST

## 2020-11-30 ENCOUNTER — OFFICE VISIT (OUTPATIENT)
Dept: PHYSICAL THERAPY | Facility: CLINIC | Age: 55
End: 2020-11-30
Payer: COMMERCIAL

## 2020-11-30 DIAGNOSIS — M54.2 ACUTE NECK PAIN: Primary | ICD-10-CM

## 2020-11-30 PROCEDURE — 97012 MECHANICAL TRACTION THERAPY: CPT | Performed by: PHYSICAL THERAPIST

## 2020-11-30 PROCEDURE — 97140 MANUAL THERAPY 1/> REGIONS: CPT | Performed by: PHYSICAL THERAPIST

## 2020-11-30 PROCEDURE — 97110 THERAPEUTIC EXERCISES: CPT | Performed by: PHYSICAL THERAPIST

## 2020-12-02 ENCOUNTER — OFFICE VISIT (OUTPATIENT)
Dept: PHYSICAL THERAPY | Facility: CLINIC | Age: 55
End: 2020-12-02
Payer: COMMERCIAL

## 2020-12-02 DIAGNOSIS — M54.2 ACUTE NECK PAIN: Primary | ICD-10-CM

## 2020-12-02 PROCEDURE — 97140 MANUAL THERAPY 1/> REGIONS: CPT | Performed by: PHYSICAL THERAPIST

## 2020-12-02 PROCEDURE — 97012 MECHANICAL TRACTION THERAPY: CPT | Performed by: PHYSICAL THERAPIST

## 2020-12-02 PROCEDURE — 97110 THERAPEUTIC EXERCISES: CPT | Performed by: PHYSICAL THERAPIST

## 2020-12-03 ENCOUNTER — VBI (OUTPATIENT)
Dept: ADMINISTRATIVE | Facility: OTHER | Age: 55
End: 2020-12-03

## 2020-12-04 ENCOUNTER — TELEPHONE (OUTPATIENT)
Dept: FAMILY MEDICINE CLINIC | Facility: CLINIC | Age: 55
End: 2020-12-04

## 2020-12-04 DIAGNOSIS — E11.9 TYPE 2 DIABETES MELLITUS WITHOUT COMPLICATION, UNSPECIFIED WHETHER LONG TERM INSULIN USE (HCC): ICD-10-CM

## 2020-12-04 DIAGNOSIS — R80.9 CONTROLLED TYPE 2 DIABETES MELLITUS WITH MICROALBUMINURIA, WITHOUT LONG-TERM CURRENT USE OF INSULIN (HCC): ICD-10-CM

## 2020-12-04 DIAGNOSIS — E11.29 CONTROLLED TYPE 2 DIABETES MELLITUS WITH MICROALBUMINURIA, WITHOUT LONG-TERM CURRENT USE OF INSULIN (HCC): ICD-10-CM

## 2020-12-04 DIAGNOSIS — I48.21 PERMANENT ATRIAL FIBRILLATION (HCC): ICD-10-CM

## 2020-12-04 DIAGNOSIS — I63.519 ACUTE ISCHEMIC CEREBROVASCULAR ACCIDENT (CVA) INVOLVING MIDDLE CEREBRAL ARTERY TERRITORY (HCC): ICD-10-CM

## 2020-12-04 PROCEDURE — 3066F NEPHROPATHY DOC TX: CPT | Performed by: FAMILY MEDICINE

## 2020-12-07 ENCOUNTER — APPOINTMENT (OUTPATIENT)
Dept: PHYSICAL THERAPY | Facility: CLINIC | Age: 55
End: 2020-12-07
Payer: COMMERCIAL

## 2020-12-08 ENCOUNTER — TRANSCRIBE ORDERS (OUTPATIENT)
Dept: FAMILY MEDICINE CLINIC | Facility: CLINIC | Age: 55
End: 2020-12-08

## 2020-12-08 ENCOUNTER — OFFICE VISIT (OUTPATIENT)
Dept: PHYSICAL THERAPY | Facility: CLINIC | Age: 55
End: 2020-12-08
Payer: COMMERCIAL

## 2020-12-08 DIAGNOSIS — M54.2 ACUTE NECK PAIN: Primary | ICD-10-CM

## 2020-12-08 DIAGNOSIS — M54.12 RADICULOPATHY, CERVICAL REGION: ICD-10-CM

## 2020-12-08 DIAGNOSIS — M54.2 CERVICALGIA: Primary | ICD-10-CM

## 2020-12-08 PROCEDURE — 97012 MECHANICAL TRACTION THERAPY: CPT | Performed by: PHYSICAL THERAPIST

## 2020-12-08 PROCEDURE — 97140 MANUAL THERAPY 1/> REGIONS: CPT | Performed by: PHYSICAL THERAPIST

## 2020-12-08 PROCEDURE — 97110 THERAPEUTIC EXERCISES: CPT | Performed by: PHYSICAL THERAPIST

## 2020-12-09 ENCOUNTER — OFFICE VISIT (OUTPATIENT)
Dept: PHYSICAL THERAPY | Facility: CLINIC | Age: 55
End: 2020-12-09
Payer: COMMERCIAL

## 2020-12-09 DIAGNOSIS — M54.2 ACUTE NECK PAIN: Primary | ICD-10-CM

## 2020-12-09 PROCEDURE — 97012 MECHANICAL TRACTION THERAPY: CPT | Performed by: PHYSICAL THERAPIST

## 2020-12-09 PROCEDURE — 97110 THERAPEUTIC EXERCISES: CPT | Performed by: PHYSICAL THERAPIST

## 2020-12-09 PROCEDURE — 97140 MANUAL THERAPY 1/> REGIONS: CPT | Performed by: PHYSICAL THERAPIST

## 2020-12-15 ENCOUNTER — OFFICE VISIT (OUTPATIENT)
Dept: PHYSICAL THERAPY | Facility: CLINIC | Age: 55
End: 2020-12-15
Payer: COMMERCIAL

## 2020-12-15 DIAGNOSIS — M54.2 ACUTE NECK PAIN: Primary | ICD-10-CM

## 2020-12-15 PROCEDURE — 97140 MANUAL THERAPY 1/> REGIONS: CPT | Performed by: PHYSICAL THERAPIST

## 2020-12-15 PROCEDURE — 97110 THERAPEUTIC EXERCISES: CPT | Performed by: PHYSICAL THERAPIST

## 2020-12-15 PROCEDURE — 97012 MECHANICAL TRACTION THERAPY: CPT | Performed by: PHYSICAL THERAPIST

## 2020-12-18 ENCOUNTER — OFFICE VISIT (OUTPATIENT)
Dept: PHYSICAL THERAPY | Facility: CLINIC | Age: 55
End: 2020-12-18
Payer: COMMERCIAL

## 2020-12-18 DIAGNOSIS — M54.2 ACUTE NECK PAIN: Primary | ICD-10-CM

## 2020-12-18 PROCEDURE — 97110 THERAPEUTIC EXERCISES: CPT | Performed by: PHYSICAL THERAPIST

## 2020-12-18 PROCEDURE — 97012 MECHANICAL TRACTION THERAPY: CPT | Performed by: PHYSICAL THERAPIST

## 2020-12-18 PROCEDURE — 97140 MANUAL THERAPY 1/> REGIONS: CPT | Performed by: PHYSICAL THERAPIST

## 2020-12-21 ENCOUNTER — DOCUMENTATION (OUTPATIENT)
Dept: CARDIOLOGY CLINIC | Facility: CLINIC | Age: 55
End: 2020-12-21

## 2020-12-21 ENCOUNTER — OFFICE VISIT (OUTPATIENT)
Dept: FAMILY MEDICINE CLINIC | Facility: CLINIC | Age: 55
End: 2020-12-21
Payer: COMMERCIAL

## 2020-12-21 ENCOUNTER — OFFICE VISIT (OUTPATIENT)
Dept: PHYSICAL THERAPY | Facility: CLINIC | Age: 55
End: 2020-12-21
Payer: COMMERCIAL

## 2020-12-21 VITALS
WEIGHT: 221.6 LBS | BODY MASS INDEX: 30.02 KG/M2 | TEMPERATURE: 97.5 F | DIASTOLIC BLOOD PRESSURE: 72 MMHG | SYSTOLIC BLOOD PRESSURE: 120 MMHG | HEART RATE: 68 BPM | HEIGHT: 72 IN

## 2020-12-21 DIAGNOSIS — M54.2 ACUTE NECK PAIN: Primary | ICD-10-CM

## 2020-12-21 DIAGNOSIS — I63.10 CEREBROVASCULAR ACCIDENT (CVA) DUE TO EMBOLISM OF PRECEREBRAL ARTERY (HCC): ICD-10-CM

## 2020-12-21 DIAGNOSIS — E78.2 MIXED HYPERLIPIDEMIA: ICD-10-CM

## 2020-12-21 DIAGNOSIS — M50.90 CERVICAL DISC DISEASE: Primary | ICD-10-CM

## 2020-12-21 DIAGNOSIS — E11.22 TYPE 2 DIABETES MELLITUS WITH CHRONIC KIDNEY DISEASE, WITHOUT LONG-TERM CURRENT USE OF INSULIN, UNSPECIFIED CKD STAGE (HCC): ICD-10-CM

## 2020-12-21 DIAGNOSIS — I48.21 PERMANENT ATRIAL FIBRILLATION (HCC): ICD-10-CM

## 2020-12-21 DIAGNOSIS — I10 ESSENTIAL HYPERTENSION: ICD-10-CM

## 2020-12-21 DIAGNOSIS — N52.1 ERECTILE DYSFUNCTION DUE TO DISEASES CLASSIFIED ELSEWHERE: ICD-10-CM

## 2020-12-21 PROCEDURE — 97012 MECHANICAL TRACTION THERAPY: CPT | Performed by: PHYSICAL THERAPIST

## 2020-12-21 PROCEDURE — 3074F SYST BP LT 130 MM HG: CPT | Performed by: FAMILY MEDICINE

## 2020-12-21 PROCEDURE — 99214 OFFICE O/P EST MOD 30 MIN: CPT | Performed by: FAMILY MEDICINE

## 2020-12-21 PROCEDURE — 97110 THERAPEUTIC EXERCISES: CPT | Performed by: PHYSICAL THERAPIST

## 2020-12-21 PROCEDURE — 97140 MANUAL THERAPY 1/> REGIONS: CPT | Performed by: PHYSICAL THERAPIST

## 2020-12-21 PROCEDURE — 3008F BODY MASS INDEX DOCD: CPT | Performed by: FAMILY MEDICINE

## 2020-12-21 PROCEDURE — 3078F DIAST BP <80 MM HG: CPT | Performed by: FAMILY MEDICINE

## 2020-12-21 PROCEDURE — 3066F NEPHROPATHY DOC TX: CPT | Performed by: FAMILY MEDICINE

## 2020-12-21 PROCEDURE — 1036F TOBACCO NON-USER: CPT | Performed by: FAMILY MEDICINE

## 2020-12-21 RX ORDER — TADALAFIL 10 MG/1
TABLET ORAL
Qty: 10 TABLET | Refills: 0 | Status: SHIPPED | OUTPATIENT
Start: 2020-12-21 | End: 2022-01-17

## 2020-12-22 ENCOUNTER — TRANSCRIBE ORDERS (OUTPATIENT)
Dept: FAMILY MEDICINE CLINIC | Facility: CLINIC | Age: 55
End: 2020-12-22

## 2020-12-22 DIAGNOSIS — M54.2 CERVICALGIA: Primary | ICD-10-CM

## 2020-12-30 DIAGNOSIS — E11.29 CONTROLLED TYPE 2 DIABETES MELLITUS WITH MICROALBUMINURIA, WITHOUT LONG-TERM CURRENT USE OF INSULIN (HCC): ICD-10-CM

## 2020-12-30 DIAGNOSIS — E11.9 TYPE 2 DIABETES MELLITUS WITHOUT COMPLICATION, UNSPECIFIED WHETHER LONG TERM INSULIN USE (HCC): ICD-10-CM

## 2020-12-30 DIAGNOSIS — I48.21 PERMANENT ATRIAL FIBRILLATION (HCC): ICD-10-CM

## 2020-12-30 DIAGNOSIS — I63.519 ACUTE ISCHEMIC CEREBROVASCULAR ACCIDENT (CVA) INVOLVING MIDDLE CEREBRAL ARTERY TERRITORY (HCC): ICD-10-CM

## 2020-12-30 DIAGNOSIS — R80.9 CONTROLLED TYPE 2 DIABETES MELLITUS WITH MICROALBUMINURIA, WITHOUT LONG-TERM CURRENT USE OF INSULIN (HCC): ICD-10-CM

## 2021-01-04 ENCOUNTER — OFFICE VISIT (OUTPATIENT)
Dept: OBGYN CLINIC | Facility: HOSPITAL | Age: 56
End: 2021-01-04
Payer: COMMERCIAL

## 2021-01-04 VITALS
HEIGHT: 71 IN | WEIGHT: 210 LBS | SYSTOLIC BLOOD PRESSURE: 158 MMHG | DIASTOLIC BLOOD PRESSURE: 92 MMHG | BODY MASS INDEX: 29.4 KG/M2 | HEART RATE: 93 BPM

## 2021-01-04 DIAGNOSIS — I63.519 ACUTE ISCHEMIC CEREBROVASCULAR ACCIDENT (CVA) INVOLVING MIDDLE CEREBRAL ARTERY TERRITORY (HCC): ICD-10-CM

## 2021-01-04 DIAGNOSIS — E11.9 TYPE 2 DIABETES MELLITUS WITHOUT COMPLICATION, UNSPECIFIED WHETHER LONG TERM INSULIN USE (HCC): ICD-10-CM

## 2021-01-04 DIAGNOSIS — R80.9 CONTROLLED TYPE 2 DIABETES MELLITUS WITH MICROALBUMINURIA, WITHOUT LONG-TERM CURRENT USE OF INSULIN (HCC): ICD-10-CM

## 2021-01-04 DIAGNOSIS — E11.29 CONTROLLED TYPE 2 DIABETES MELLITUS WITH MICROALBUMINURIA, WITHOUT LONG-TERM CURRENT USE OF INSULIN (HCC): ICD-10-CM

## 2021-01-04 DIAGNOSIS — L30.9 HAND DERMATITIS: ICD-10-CM

## 2021-01-04 DIAGNOSIS — M50.30 DDD (DEGENERATIVE DISC DISEASE), CERVICAL: Primary | ICD-10-CM

## 2021-01-04 DIAGNOSIS — I48.20 CHRONIC ATRIAL FIBRILLATION (HCC): ICD-10-CM

## 2021-01-04 DIAGNOSIS — I48.21 PERMANENT ATRIAL FIBRILLATION (HCC): ICD-10-CM

## 2021-01-04 DIAGNOSIS — B35.6 TINEA CRURIS: ICD-10-CM

## 2021-01-04 DIAGNOSIS — M54.12 CERVICAL RADICULOPATHY: ICD-10-CM

## 2021-01-04 PROCEDURE — 3066F NEPHROPATHY DOC TX: CPT | Performed by: PHYSICAL MEDICINE & REHABILITATION

## 2021-01-04 PROCEDURE — 99244 OFF/OP CNSLTJ NEW/EST MOD 40: CPT | Performed by: ORTHOPAEDIC SURGERY

## 2021-01-04 NOTE — PROGRESS NOTES
63 y o male presents for evaluation of right arm pain  Patient describes his right arm feeling different than his left arm  He denies any burning numbness or tingling  He does describe weakness in his right arm compared to his left arm  Patient is a labor and has been on disability because of his symptoms  He has been participating in physical therapy for approximately 3 months without significant reduction in symptoms  He was seen and evaluated by pain management who had recommended epidural corticosteroid injections  However patient is on anticoagulation for history of stroke and has been unable to undergo injections  Patient's past medical history is also significant for diabetes, hepatic cirrhosis, and a fib  Review of Systems  Review of systems negative unless otherwise specified in HPI    Past Medical History  Past Medical History:   Diagnosis Date    A-fib [facility])     Colon polyp     Diabetes mellitus ([address] )     GERD (gastroesophageal reflux disease)     Hyperlipidemia     Hypertension     Liver disease     Hx Hep C treated    Osteoarthritis     TIA (transient ischemic attack)        Past Surgical History  Past Surgical History:   Procedure Laterality Date    ROLAN HOLE FOR SUBDURAL HEMATOMA      COLONOSCOPY N/A 2/22/2016    Procedure: COLONOSCOPY;  Surgeon: Erik Rios MD;  [facility]: [facility]; Service:     INGUINAL HERNIA REPAIR      ORTHOPEDIC SURGERY      SHOULDER SURGERY Left        Current Medications  Current Outpatient Medications on File Prior to Visit   Medication Sig Dispense Refill    atorvastatin (LIPITOR) 40 mg tablet TAKE 1 TABLET BY MOUTH EVERY DAY 90 tablet 0    clotrimazole (LOTRIMIN) 1 % cream Apply topically 2 (two) times a day Continue for 2-4 weeks   60 g 3    digoxin (LANOXIN) 0 25 mg tablet TAKE 1 TABLET DAILY 90 tablet 3    econazole nitrate 1 % cream Apply topically 2 (two) times a day 30 g 3    glucose blood (ONE TOUCH ULTRA TEST) test strip 3 (three) times a day      Lancets (ONETOUCH ULTRASOFT) lancets 3 (three) times a day      lisinopril (ZESTRIL) 2 5 mg tablet TAKE 1 TABLET DAILY 90 tablet 4    metFORMIN (GLUCOPHAGE) 500 mg tablet TAKE 1 TABLET TWICE A  tablet 4    metoprolol tartrate (LOPRESSOR) 25 mg tablet TAKE 1/2 TABLETS (12 5 MG TOTAL) BY MOUTH EVERY 12 (TWELVE) HOURS 30 tablet 0    Milk Thistle 250 MG CAPS Take by mouth      omeprazole (PriLOSEC) 20 mg delayed release capsule TAKE 1 CAPSULE DAILY 90 capsule 4    sildenafil (VIAGRA) 50 MG tablet Take 1 tablet (50 mg total) by mouth daily as needed for erectile dysfunction 10 tablet 0    tadalafil (CIALIS) 10 MG tablet Take 1 on 2 occasions, and then can increase to 2 on 2 attempts if not helpful  10 tablet 0    [DISCONTINUED] ELIQUIS 5 MG TAKE 1 TABLET BY MOUTH TWICE A DAY 60 tablet 11    [DISCONTINUED] metoprolol tartrate (LOPRESSOR) 25 mg tablet TAKE 1/2 TABLETS (12 5 MG TOTAL) BY MOUTH EVERY 12 (TWELVE) HOURS 30 tablet 0     No current facility-administered medications on file prior to visit          Recent Labs [facility])  0   Lab Value Date/Time    HCT 44 5 04/09/2020 0708    HCT 45 2 09/25/2014 0706    HGB 14 7 04/09/2020 0708    HGB 16 1 09/25/2014 0706    WBC 6 06 04/09/2020 0708    WBC 6 71 09/25/2014 0706    INR 1 31 (H) 04/09/2020 0708    GLUCOSE 123 01/19/2015 0644    HGBA1C 7 0 (H) 10/28/2020 0914    HGBA1C 5 9 (H) 04/09/2019 1736         Physical exam  · General: Awake, Alert, Oriented  · Eyes: Pupils equal, round and reactive to light  · Heart: regular rate and rhythm  · Lungs: No audible wheezing  · Abdomen: soft  Neck exam  · 5/5 strength is equal to contralateral extremity C5-T1  · Sensation intact to light touch and equal to contralateral extremity C5-T1  · +2 biceps/BR reflexes   · +spurlings  · +lhermittes   · Negative Tigist's  · Palpable pluses distally     Procedure  None     Imaging  Previously obtained cervical MRI reviewed with patient today's visit  This reveals a large C5-C6 central disc herniation with multilevel degenerative disc disease evident  No evidence of spinal cord myelomalacia    54-year-old male with multilevel cervical DDD with large C5-C6 disc herniation    Long discussion was had with patient concerning the etiology of his symptoms  They were described in detail  After discussion patient, it was decided the patient would proceed with epidural steroid injections performed by pain management  As explained the patient that if if symptoms do not improve, consideration would be given for surgical intervention in the form of ACDF  Patient moves in agreement this plan and will follow-up with pain management to coordinate timing of injections with his Eliquis for hx of previous CVA

## 2021-01-04 NOTE — LETTER
January 4, 2021     MD [address]  [place] [address]    Patient: Ted Angeles   YOB: 1965   Date of Visit: 1/4/2021       Dear Dr Bailey San: Thank you for referring Voncille Homans to me for evaluation  Below are my notes for this consultation  If you have questions, please do not hesitate to call me  I look forward to following your patient along with you  Sincerely,        Patti Braxton MD        CC: No Recipients  Juan J Solis MD  1/4/2021  1:32 PM  Attested  55 y o male presents for evaluation of right arm pain  Patient describes his right arm feeling different than his left arm  He denies any burning numbness or tingling  He does describe weakness in his right arm compared to his left arm  Patient is a labor and has been on disability because of his symptoms  He has been participating in physical therapy for approximately 3 months without significant reduction in symptoms  He was seen and evaluated by pain management who had recommended epidural corticosteroid injections  However patient is on anticoagulation for history of stroke and has been unable to undergo injections  Patient's past medical history is also significant for diabetes, hepatic cirrhosis, and a fib  Review of Systems  Review of systems negative unless otherwise specified in HPI    Past Medical History  Past Medical History:   Diagnosis Date    A-fib [facility])     Colon polyp     Diabetes mellitus [address] )     GERD (gastroesophageal reflux disease)     Hyperlipidemia     Hypertension     Liver disease     Hx Hep C treated    Osteoarthritis     TIA (transient ischemic attack)        Past Surgical History  Past Surgical History:   Procedure Laterality Date    ROLAN HOLE FOR SUBDURAL HEMATOMA      COLONOSCOPY N/A 2/22/2016    Procedure: COLONOSCOPY;  Surgeon: Becky Vela MD;  Location: [facility];   Service:     INGUINAL HERNIA REPAIR      ORTHOPEDIC SURGERY      SHOULDER SURGERY Left        Current Medications  Current Outpatient Medications on File Prior to Visit   Medication Sig Dispense Refill    atorvastatin (LIPITOR) 40 mg tablet TAKE 1 TABLET BY MOUTH EVERY DAY 90 tablet 0    clotrimazole (LOTRIMIN) 1 % cream Apply topically 2 (two) times a day Continue for 2-4 weeks  60 g 3    digoxin (LANOXIN) 0 25 mg tablet TAKE 1 TABLET DAILY 90 tablet 3    econazole nitrate 1 % cream Apply topically 2 (two) times a day 30 g 3    glucose blood (ONE TOUCH ULTRA TEST) test strip 3 (three) times a day      Lancets (ONETOUCH ULTRASOFT) lancets 3 (three) times a day      lisinopril (ZESTRIL) 2 5 mg tablet TAKE 1 TABLET DAILY 90 tablet 4    metFORMIN (GLUCOPHAGE) 500 mg tablet TAKE 1 TABLET TWICE A  tablet 4    metoprolol tartrate (LOPRESSOR) 25 mg tablet TAKE 1/2 TABLETS (12 5 MG TOTAL) BY MOUTH EVERY 12 (TWELVE) HOURS 30 tablet 0    Milk Thistle 250 MG CAPS Take by mouth      omeprazole (PriLOSEC) 20 mg delayed release capsule TAKE 1 CAPSULE DAILY 90 capsule 4    sildenafil (VIAGRA) 50 MG tablet Take 1 tablet (50 mg total) by mouth daily as needed for erectile dysfunction 10 tablet 0    tadalafil (CIALIS) 10 MG tablet Take 1 on 2 occasions, and then can increase to 2 on 2 attempts if not helpful  10 tablet 0    [DISCONTINUED] ELIQUIS 5 MG TAKE 1 TABLET BY MOUTH TWICE A DAY 60 tablet 11    [DISCONTINUED] metoprolol tartrate (LOPRESSOR) 25 mg tablet TAKE 1/2 TABLETS (12 5 MG TOTAL) BY MOUTH EVERY 12 (TWELVE) HOURS 30 tablet 0     No current facility-administered medications on file prior to visit          Recent Labs [facility])  0   Lab Value Date/Time    HCT 44 5 04/09/2020 0708    HCT 45 2 09/25/2014 0706    HGB 14 7 04/09/2020 0708    HGB 16 1 09/25/2014 0706    WBC 6 06 04/09/2020 0708    WBC 6 71 09/25/2014 0706    INR 1 31 (H) 04/09/2020 0708    GLUCOSE 123 01/19/2015 0644    HGBA1C 7 0 (H) 10/28/2020 0914    HGBA1C 5 9 (H) 04/09/2019 1736 Physical exam  · General: Awake, Alert, Oriented  · Eyes: Pupils equal, round and reactive to light  · Heart: regular rate and rhythm  · Lungs: No audible wheezing  · Abdomen: soft  Neck exam  · 5/5 strength is equal to contralateral extremity C5-T1  · Sensation intact to light touch and equal to contralateral extremity C5-T1  · +2 biceps/BR reflexes   · +spurlings  · +lhermittes   · Negative Tigist's  · Palpable pluses distally     Procedure  None     Imaging  Previously obtained cervical MRI reviewed with patient today's visit  This reveals a large C5-C6 central disc herniation with multilevel degenerative disc disease evident  No evidence of spinal cord myelomalacia    77-year-old male with multilevel cervical DDD with large C5-C6 disc herniation    Long discussion was had with patient concerning the etiology of his symptoms  They were described in detail  After discussion patient, it was decided the patient would proceed with epidural steroid injections performed by pain management  As explained the patient that if if symptoms do not improve, consideration would be given for surgical intervention in the form of ACDF  Patient moves in agreement this plan and will follow-up with pain management to coordinate timing of injections with his Eliquis for hx of previous CVA  Attestation signed by Teddy Andrade MD at 1/4/2021  1:32 PM:  Patient is seen and examined  I agree with the above note and plan of care by resident    Multilevel ddd with most pronounced level at C5-6, cord effacement but no evidence of cord signal abnormality  Clinically, no signs of myelopathy on examination    Treatment options including injections and surgical intervention are discussed    We agree on conservative for now, start injections, if no improvement or clinical worsening, will proceed to surgical intervention, likely in the way of ACDF

## 2021-01-05 ENCOUNTER — TELEPHONE (OUTPATIENT)
Dept: OBGYN CLINIC | Facility: HOSPITAL | Age: 56
End: 2021-01-05

## 2021-01-05 RX ORDER — CLOTRIMAZOLE 1 %
CREAM (GRAM) TOPICAL 2 TIMES DAILY
Qty: 60 G | Refills: 0 | Status: SHIPPED | OUTPATIENT
Start: 2021-01-05

## 2021-01-05 RX ORDER — ATORVASTATIN CALCIUM 40 MG/1
40 TABLET, FILM COATED ORAL DAILY
Qty: 90 TABLET | Refills: 3 | Status: SHIPPED | OUTPATIENT
Start: 2021-01-05

## 2021-01-06 ENCOUNTER — TELEPHONE (OUTPATIENT)
Dept: OBGYN CLINIC | Facility: HOSPITAL | Age: 56
End: 2021-01-06

## 2021-01-06 NOTE — TELEPHONE ENCOUNTER
Received Disability Forms but unable to fill out due to Physician not taking Patient out on Disability  Called Patient to Inform him the Status  He will like me to Fax the Forms to His PCP at [phone]

## 2021-01-13 ENCOUNTER — TELEPHONE (OUTPATIENT)
Dept: PAIN MEDICINE | Facility: MEDICAL CENTER | Age: 56
End: 2021-01-13

## 2021-01-13 NOTE — TELEPHONE ENCOUNTER
Patients insurance requires referral, M54 12, m50 90 are codes previously used       [facility] [phone]   Please fax to [phone]

## 2021-01-14 ENCOUNTER — OFFICE VISIT (OUTPATIENT)
Dept: PAIN MEDICINE | Facility: MEDICAL CENTER | Age: 56
End: 2021-01-14
Payer: COMMERCIAL

## 2021-01-14 VITALS
TEMPERATURE: 98.3 F | BODY MASS INDEX: 30.1 KG/M2 | HEIGHT: 71 IN | HEART RATE: 99 BPM | DIASTOLIC BLOOD PRESSURE: 84 MMHG | WEIGHT: 215 LBS | SYSTOLIC BLOOD PRESSURE: 149 MMHG | RESPIRATION RATE: 16 BRPM

## 2021-01-14 DIAGNOSIS — I48.21 PERMANENT ATRIAL FIBRILLATION (HCC): ICD-10-CM

## 2021-01-14 DIAGNOSIS — K74.69 OTHER CIRRHOSIS OF LIVER (HCC): ICD-10-CM

## 2021-01-14 DIAGNOSIS — M54.12 CERVICAL RADICULOPATHY: ICD-10-CM

## 2021-01-14 DIAGNOSIS — D69.6 THROMBOCYTOPENIA (HCC): ICD-10-CM

## 2021-01-14 DIAGNOSIS — G45.9 TRANSIENT ISCHEMIC ATTACK (TIA): ICD-10-CM

## 2021-01-14 DIAGNOSIS — M50.90 CERVICAL DISC DISEASE: Primary | ICD-10-CM

## 2021-01-14 PROCEDURE — 1036F TOBACCO NON-USER: CPT | Performed by: PHYSICAL MEDICINE & REHABILITATION

## 2021-01-14 PROCEDURE — 99214 OFFICE O/P EST MOD 30 MIN: CPT | Performed by: PHYSICAL MEDICINE & REHABILITATION

## 2021-01-14 PROCEDURE — 3077F SYST BP >= 140 MM HG: CPT | Performed by: PHYSICAL MEDICINE & REHABILITATION

## 2021-01-14 PROCEDURE — 3079F DIAST BP 80-89 MM HG: CPT | Performed by: PHYSICAL MEDICINE & REHABILITATION

## 2021-01-14 PROCEDURE — 3008F BODY MASS INDEX DOCD: CPT | Performed by: PHYSICAL MEDICINE & REHABILITATION

## 2021-01-14 RX ORDER — PREGABALIN 75 MG/1
75 CAPSULE ORAL 2 TIMES DAILY
Qty: 60 CAPSULE | Refills: 1 | Status: SHIPPED | OUTPATIENT
Start: 2021-01-14 | End: 2021-02-11 | Stop reason: SDUPTHER

## 2021-01-14 NOTE — LETTER
January 14, 2021     Missy Newell MD  [address] [place] [address] [address]    Patient: Yasmin Pruitt   YOB: 1965   Date of Visit: 1/14/2021       Dear Dr Sharron Rosales: Thank you for referring Debbie Zelaya to me for evaluation  Below are my notes for this consultation  If you have questions, please do not hesitate to call me  I look forward to following your patient along with you  Sincerely,        Ez Ramírez DO        CC: MD Akhil Nixon MD Moses Denise, DO  1/14/2021  8:45 AM  Incomplete  Assessment  1  Cervical disc disease    2  Cervical radiculopathy    3  Thrombocytopenia ([address] )    4  Permanent atrial fibrillation ([facility] )    5  Transient ischemic attack (TIA)    6  Other cirrhosis of liver ([facility] )        Plan  1  I had a thorough discussion with the patient regarding risks of holding anticoagulants  He states that he really would not like to go through another transient ischemic attack or stroke  I reviewed with him that that is a risk of holding anticoagulation  He understands we need to weigh the risks and benefits of an epidural steroid injection with the relatively short duration benefit of the epidural compared to significant life impact of a stroke  At this time we will trial neuropathic pain medications in the form of Lyrica 75 mg twice daily  I reviewed with him that we will follow-up in 4 weeks to evaluate his response and consider further dose adjustment at that time  If he does elect to pursue an epidural steroid injection we would need to obtain labs to demonstrate he has at least 100,000 platelets to safely undergo the epidural injection  This was all reviewed with the patient and he has a thorough understanding of this and did not have any further questions at this time  My impressions and treatment recommendations were discussed in detail with the patient who verbalized understanding and had no further questions    Discharge instructions were provided  I personally saw and examined the patient and I agree with the above discussed plan of care  No orders of the defined types were placed in this encounter  New Medications Ordered This Visit   Medications    pregabalin (LYRICA) 75 mg capsule     Sig: Take 1 capsule (75 mg total) by mouth 2 (two) times a day     Dispense:  60 capsule     Refill:  1       History of Present Illness    Kishore Ingram is a 63 y o  male seen in follow-up to discuss treatment options including epidural steroid injection or medication trials  He states that he would like to avoid surgery if possible and does not want to put himself at risk for another transient ischemic attack or stroke  With this knowledge he also understands that this essentially limits him to physical therapy and medication trials  At this time he rates the pain as a 7/10 and states that is the same  This is constant and characterized as dull, aching, sharp, shooting, numbness  He is frustrated and would like to return to work but understands that this line of work may need to change as he may not be able to tolerate the functional demands  I have personally reviewed and/or updated the patient's past medical history, past surgical history, family history, social history, current medications, allergies, and vital signs today  Review of Systems   Respiratory: Negative for shortness of breath  Cardiovascular: Negative for chest pain  Gastrointestinal: Negative for constipation, diarrhea, nausea and vomiting  Musculoskeletal: Positive for gait problem, myalgias, neck pain and neck stiffness  Negative for arthralgias and joint swelling  Skin: Negative for rash  Neurological: Negative for dizziness, seizures and weakness  All other systems reviewed and are negative        Patient Active Problem List   Diagnosis    Allergic rhinitis    [facility]    Chronic hepatitis C virus infection ([facility] )    Colon polyp    Type 2 diabetes mellitus (HCC)    Erectile dysfunction    Esophageal reflux    Microalbuminuria    Nocturia    Palpitations    RBBB    Thrombocytopenia (HCC)    Tobacco abuse    Hand dermatitis    Vasculitis of skin    Chronic pain of right ankle    Cerebrovascular accident (CVA) due to embolism of precerebral artery (HCC)    Obesity    Cirrhosis of liver (HCC)    Essential hypertension    Muscle ache    Acute pain of right shoulder    Tinea cruris    Cervical disc disease    Hyperlipidemia    DDD (degenerative disc disease), cervical    Cervical radiculopathy       Past Medical History:   Diagnosis Date    A-fib [facility])     Colon polyp     Diabetes mellitus ([facility] )     GERD (gastroesophageal reflux disease)     Hyperlipidemia     Hypertension     Liver disease     Hx Hep C treated    Osteoarthritis     TIA (transient ischemic attack)        Past Surgical History:   Procedure Laterality Date    ROLAN HOLE FOR SUBDURAL HEMATOMA      COLONOSCOPY N/A 2016    Procedure: COLONOSCOPY;  Surgeon: Annette Cabezas MD;  Location: [facility];   Service:     INGUINAL HERNIA REPAIR      ORTHOPEDIC SURGERY      SHOULDER SURGERY Left        Family History   Problem Relation Age of Onset    Atrial fibrillation Mother     Hypertension Mother     No Known Problems Father        Social History     Occupational History    Occupation: EMPLOYED   Tobacco Use    Smoking status: Former Smoker     Packs/day: 0 20     Quit date: 2018     Years since quittin 4    Smokeless tobacco: Former User    Tobacco comment: EXPOSURE TO SECOND HAND SMOKE  ALL SCRIPTS ALSO SAYS FORMER SMOKER   Substance and Sexual Activity    Alcohol use: No     Frequency: Never    Drug use: No     Comment: ALL SCRIPTS SAYS ACTIVE    Sexual activity: Yes     Comment: [address] RELATIONSHIP       Current Outpatient Medications on File Prior to Visit   Medication Sig    apixaban (Eliquis) 5 mg Take 1 tablet (5 mg total) by mouth 2 (two) times a day    clotrimazole (LOTRIMIN) 1 % cream Apply topically 2 (two) times a day Continue for 2-4 weeks   digoxin (LANOXIN) 0 25 mg tablet TAKE 1 TABLET DAILY    econazole nitrate 1 % cream Apply topically 2 (two) times a day    lisinopril (ZESTRIL) 2 5 mg tablet TAKE 1 TABLET DAILY    metFORMIN (GLUCOPHAGE) 500 mg tablet TAKE 1 TABLET TWICE A DAY    metoprolol tartrate (LOPRESSOR) 25 mg tablet Take 0 5 tablets (12 5 mg total) by mouth every 12 (twelve) hours    Milk Thistle 250 MG CAPS Take by mouth    omeprazole (PriLOSEC) 20 mg delayed release capsule TAKE 1 CAPSULE DAILY    sildenafil (VIAGRA) 50 MG tablet Take 1 tablet (50 mg total) by mouth daily as needed for erectile dysfunction    tadalafil (CIALIS) 10 MG tablet Take 1 on 2 occasions, and then can increase to 2 on 2 attempts if not helpful   atorvastatin (LIPITOR) 40 mg tablet Take 1 tablet (40 mg total) by mouth daily (Patient not taking: Reported on 1/14/2021)    glucose blood (ONE TOUCH ULTRA TEST) test strip 3 (three) times a day    Lancets (ONETOUCH ULTRASOFT) lancets 3 (three) times a day    [DISCONTINUED] metoprolol tartrate (LOPRESSOR) 25 mg tablet TAKE 1/2 TABLETS (12 5 MG TOTAL) BY MOUTH EVERY 12 (TWELVE) HOURS     No current facility-administered medications on file prior to visit  No Known Allergies    Physical Exam    /84   Pulse 99   Temp 98 3 °F (36 8 °C)   Resp 16   Ht 5' 11" (1 803 m)   Wt 97 5 kg (215 lb)   BMI 29 99 kg/m²     Constitutional: normal, well developed, well nourished, alert, in no distress and non-toxic and no overt pain behavior    Eyes: anicteric  HEENT: grossly intact  Neck: supple, symmetric, trachea midline and no masses   Pulmonary:even and unlabored  Cardiovascular:No edema or pitting edema present  Psychiatric:Mood and affect appropriate  Neurologic:Cranial Nerves II-XII grossly intact  Musculoskeletal:normal    Imaging

## 2021-01-14 NOTE — PROGRESS NOTES
Assessment  1  Cervical disc disease    2  Cervical radiculopathy    3  Thrombocytopenia ([address] )    4  Permanent atrial fibrillation ([facility] [address] )    5  Transient ischemic attack (TIA)    6  Other cirrhosis of liver ([facility] )        Plan  1  I had a thorough discussion with the patient regarding risks of holding anticoagulants  He states that he really would not like to go through another transient ischemic attack or stroke  I reviewed with him that that is a risk of holding anticoagulation  He understands we need to weigh the risks and benefits of an epidural steroid injection with the relatively short duration benefit of the epidural compared to significant life impact of a stroke  At this time we will trial neuropathic pain medications in the form of Lyrica 75 mg twice daily  I reviewed with him that we will follow-up in 4 weeks to evaluate his response and consider further dose adjustment at that time  If he does elect to pursue an epidural steroid injection we would need to obtain labs to demonstrate he has at least 100,000 platelets to safely undergo the epidural injection  This was all reviewed with the patient and he has a thorough understanding of this and did not have any further questions at this time  My impressions and treatment recommendations were discussed in detail with the patient who verbalized understanding and had no further questions  Discharge instructions were provided  I personally saw and examined the patient and I agree with the above discussed plan of care  No orders of the defined types were placed in this encounter      New Medications Ordered This Visit   Medications    pregabalin (LYRICA) 75 mg capsule     Sig: Take 1 capsule (75 mg total) by mouth 2 (two) times a day     Dispense:  60 capsule     Refill:  1       History of Present Illness    Denise Jordan is a 63 y o  male seen in follow-up to discuss treatment options including epidural steroid injection or medication trials  He states that he would like to avoid surgery if possible and does not want to put himself at risk for another transient ischemic attack or stroke  With this knowledge he also understands that this essentially limits him to physical therapy and medication trials  At this time he rates the pain as a 7/10 and states that is the same  This is constant and characterized as dull, aching, sharp, shooting, numbness  He is frustrated and would like to return to work but understands that this line of work may need to change as he may not be able to tolerate the functional demands  I have personally reviewed and/or updated the patient's past medical history, past surgical history, family history, social history, current medications, allergies, and vital signs today  Review of Systems   Respiratory: Negative for shortness of breath  Cardiovascular: Negative for chest pain  Gastrointestinal: Negative for constipation, diarrhea, nausea and vomiting  Musculoskeletal: Positive for gait problem, myalgias, neck pain and neck stiffness  Negative for arthralgias and joint swelling  Skin: Negative for rash  Neurological: Negative for dizziness, seizures and weakness  All other systems reviewed and are negative        Patient Active Problem List   Diagnosis    Allergic rhinitis    Atrial fibrillation (HCC)    Chronic hepatitis C virus infection ([facility] )    Colon polyp    Type 2 diabetes mellitus ([facility] )    Erectile dysfunction    Esophageal reflux    Microalbuminuria    Nocturia    Palpitations    RBBB    Thrombocytopenia (HCC)    Tobacco abuse    Hand dermatitis    Vasculitis of skin    Chronic pain of right ankle    Cerebrovascular accident (CVA) due to embolism of precerebral artery (HCC)    Obesity    Cirrhosis of liver (HCC)    Essential hypertension    Muscle ache    Acute pain of right shoulder    Tinea cruris    Cervical disc disease    Hyperlipidemia    DDD (degenerative disc disease), cervical    Cervical radiculopathy       Past Medical History:   Diagnosis Date    A-fib [facility])     Colon polyp     Diabetes mellitus ([address] )     GERD (gastroesophageal reflux disease)     Hyperlipidemia     Hypertension     Liver disease     Hx Hep C treated    Osteoarthritis     TIA (transient ischemic attack)        Past Surgical History:   Procedure Laterality Date    ROLAN HOLE FOR SUBDURAL HEMATOMA      COLONOSCOPY N/A 2016    Procedure: COLONOSCOPY;  Surgeon: Raquel Serrano MD;  Location: [facility]; Service:     INGUINAL HERNIA REPAIR      ORTHOPEDIC SURGERY      SHOULDER SURGERY Left        Family History   Problem Relation Age of Onset    Atrial fibrillation Mother     Hypertension Mother     No Known Problems Father        Social History     Occupational History    Occupation: EMPLOYED   Tobacco Use    Smoking status: Former Smoker     Packs/day: 0 20     Quit date: 2018     Years since quittin 4    Smokeless tobacco: Former User    Tobacco comment: EXPOSURE TO SECOND HAND SMOKE  ALL SCRIPTS ALSO SAYS FORMER SMOKER   Substance and Sexual Activity    Alcohol use: No     Frequency: Never    Drug use: No     Comment: ALL SCRIPTS SAYS ACTIVE    Sexual activity: Yes     Comment: [address] RELATIONSHIP       Current Outpatient Medications on File Prior to Visit   Medication Sig    apixaban (Eliquis) 5 mg Take 1 tablet (5 mg total) by mouth 2 (two) times a day    clotrimazole (LOTRIMIN) 1 % cream Apply topically 2 (two) times a day Continue for 2-4 weeks      digoxin (LANOXIN) 0 25 mg tablet TAKE 1 TABLET DAILY    econazole nitrate 1 % cream Apply topically 2 (two) times a day    lisinopril (ZESTRIL) 2 5 mg tablet TAKE 1 TABLET DAILY    metFORMIN (GLUCOPHAGE) 500 mg tablet TAKE 1 TABLET TWICE A DAY    metoprolol tartrate (LOPRESSOR) 25 mg tablet Take 0 5 tablets (12 5 mg total) by mouth every 12 (twelve) hours    Milk Thistle 250 MG CAPS Take by mouth    omeprazole (PriLOSEC) 20 mg delayed release capsule TAKE 1 CAPSULE DAILY    sildenafil (VIAGRA) 50 MG tablet Take 1 tablet (50 mg total) by mouth daily as needed for erectile dysfunction    tadalafil (CIALIS) 10 MG tablet Take 1 on 2 occasions, and then can increase to 2 on 2 attempts if not helpful   atorvastatin (LIPITOR) 40 mg tablet Take 1 tablet (40 mg total) by mouth daily (Patient not taking: Reported on 1/14/2021)    glucose blood (ONE TOUCH ULTRA TEST) test strip 3 (three) times a day    Lancets (ONETOUCH ULTRASOFT) lancets 3 (three) times a day    [DISCONTINUED] metoprolol tartrate (LOPRESSOR) 25 mg tablet TAKE 1/2 TABLETS (12 5 MG TOTAL) BY MOUTH EVERY 12 (TWELVE) HOURS     No current facility-administered medications on file prior to visit  No Known Allergies    Physical Exam    /84   Pulse 99   Temp 98 3 °F (36 8 °C)   Resp 16   Ht 5' 11" (1 803 m)   Wt 97 5 kg (215 lb)   BMI 29 99 kg/m²     Constitutional: normal, well developed, well nourished, alert, in no distress and non-toxic and no overt pain behavior    Eyes: anicteric  HEENT: grossly intact  Neck: supple, symmetric, trachea midline and no masses   Pulmonary:even and unlabored  Cardiovascular:No edema or pitting edema present  Psychiatric:Mood and affect appropriate  Neurologic:Cranial Nerves II-XII grossly intact  Musculoskeletal:normal    Imaging

## 2021-01-19 ENCOUNTER — TELEPHONE (OUTPATIENT)
Dept: FAMILY MEDICINE CLINIC | Facility: CLINIC | Age: 56
End: 2021-01-19

## 2021-01-19 NOTE — TELEPHONE ENCOUNTER
PT IS ASKING IF SOMEONE CAN PLEASE GET DR PETIT TO SIGN AND FAX HIS [address] PAPERWORK TODAY  HE STILL HASN'T GOT PAID AND WILL NOT GET PAID UNLESS DR PETIT SIGNS THE PAPERWORK  HE STATES HE HAS BEEN CALLING SINCE LAST WEEK  HE ONLY HAS ONE MORE DAY BEFORE HIS CLAIM IS DROPPED BECAUSE DR PETIT DIDN'T SIGN THE PAPERS    PT CAN BE REACHED [phone]

## 2021-01-21 NOTE — TELEPHONE ENCOUNTER
Pt called to follow up on this  He stated that he only has a few more days until the claim is dropped

## 2021-01-25 ENCOUNTER — TRANSCRIBE ORDERS (OUTPATIENT)
Dept: FAMILY MEDICINE CLINIC | Facility: CLINIC | Age: 56
End: 2021-01-25

## 2021-01-25 DIAGNOSIS — E11.29 TYPE 2 DIABETES MELLITUS WITH OTHER DIABETIC KIDNEY COMPLICATION (HCC): Primary | ICD-10-CM

## 2021-01-27 DIAGNOSIS — R80.9 MICROALBUMINURIA: ICD-10-CM

## 2021-01-27 DIAGNOSIS — E11.9 TYPE 2 DIABETES MELLITUS WITHOUT COMPLICATION, UNSPECIFIED WHETHER LONG TERM INSULIN USE (HCC): ICD-10-CM

## 2021-01-27 DIAGNOSIS — I48.21 PERMANENT ATRIAL FIBRILLATION (HCC): ICD-10-CM

## 2021-01-27 DIAGNOSIS — E11.29 CONTROLLED TYPE 2 DIABETES MELLITUS WITH MICROALBUMINURIA, WITHOUT LONG-TERM CURRENT USE OF INSULIN (HCC): ICD-10-CM

## 2021-01-27 DIAGNOSIS — I63.519 ACUTE ISCHEMIC CEREBROVASCULAR ACCIDENT (CVA) INVOLVING MIDDLE CEREBRAL ARTERY TERRITORY (HCC): ICD-10-CM

## 2021-01-27 DIAGNOSIS — R80.9 CONTROLLED TYPE 2 DIABETES MELLITUS WITH MICROALBUMINURIA, WITHOUT LONG-TERM CURRENT USE OF INSULIN (HCC): ICD-10-CM

## 2021-01-27 PROCEDURE — 4010F ACE/ARB THERAPY RXD/TAKEN: CPT | Performed by: PHYSICAL MEDICINE & REHABILITATION

## 2021-01-27 RX ORDER — OMEPRAZOLE 20 MG/1
CAPSULE, DELAYED RELEASE ORAL
Qty: 90 CAPSULE | Refills: 3 | Status: SHIPPED | OUTPATIENT
Start: 2021-01-27 | End: 2022-01-24

## 2021-01-27 RX ORDER — LISINOPRIL 2.5 MG/1
TABLET ORAL
Qty: 90 TABLET | Refills: 3 | Status: SHIPPED | OUTPATIENT
Start: 2021-01-27 | End: 2021-04-26 | Stop reason: SDUPTHER

## 2021-02-11 ENCOUNTER — OFFICE VISIT (OUTPATIENT)
Dept: PAIN MEDICINE | Facility: MEDICAL CENTER | Age: 56
End: 2021-02-11
Payer: COMMERCIAL

## 2021-02-11 ENCOUNTER — LAB (OUTPATIENT)
Dept: LAB | Facility: MEDICAL CENTER | Age: 56
End: 2021-02-11
Payer: COMMERCIAL

## 2021-02-11 ENCOUNTER — TRANSCRIBE ORDERS (OUTPATIENT)
Dept: ADMINISTRATIVE | Facility: HOSPITAL | Age: 56
End: 2021-02-11

## 2021-02-11 VITALS
TEMPERATURE: 97.9 F | SYSTOLIC BLOOD PRESSURE: 154 MMHG | RESPIRATION RATE: 16 BRPM | HEART RATE: 91 BPM | DIASTOLIC BLOOD PRESSURE: 96 MMHG | WEIGHT: 217 LBS | HEIGHT: 71 IN | BODY MASS INDEX: 30.38 KG/M2

## 2021-02-11 DIAGNOSIS — R80.9 TYPE 2 DIABETES MELLITUS WITH MICROALBUMINURIA, WITH LONG-TERM CURRENT USE OF INSULIN (HCC): ICD-10-CM

## 2021-02-11 DIAGNOSIS — Z79.4 TYPE 2 DIABETES MELLITUS WITH MICROALBUMINURIA, WITH LONG-TERM CURRENT USE OF INSULIN (HCC): Primary | ICD-10-CM

## 2021-02-11 DIAGNOSIS — E11.29 TYPE 2 DIABETES MELLITUS WITH MICROALBUMINURIA, WITH LONG-TERM CURRENT USE OF INSULIN (HCC): ICD-10-CM

## 2021-02-11 DIAGNOSIS — Z79.4 TYPE 2 DIABETES MELLITUS WITH MICROALBUMINURIA, WITH LONG-TERM CURRENT USE OF INSULIN (HCC): ICD-10-CM

## 2021-02-11 DIAGNOSIS — B18.2 CHRONIC HEPATITIS C WITHOUT HEPATIC COMA (HCC): ICD-10-CM

## 2021-02-11 DIAGNOSIS — R80.9 TYPE 2 DIABETES MELLITUS WITH MICROALBUMINURIA, WITH LONG-TERM CURRENT USE OF INSULIN (HCC): Primary | ICD-10-CM

## 2021-02-11 DIAGNOSIS — M54.12 CERVICAL RADICULOPATHY: Primary | ICD-10-CM

## 2021-02-11 DIAGNOSIS — D69.6 THROMBOCYTOPENIA (HCC): ICD-10-CM

## 2021-02-11 DIAGNOSIS — E11.29 TYPE 2 DIABETES MELLITUS WITH MICROALBUMINURIA, WITH LONG-TERM CURRENT USE OF INSULIN (HCC): Primary | ICD-10-CM

## 2021-02-11 LAB
EST. AVERAGE GLUCOSE BLD GHB EST-MCNC: 151 MG/DL
HBA1C MFR BLD: 6.9 %

## 2021-02-11 PROCEDURE — 36415 COLL VENOUS BLD VENIPUNCTURE: CPT

## 2021-02-11 PROCEDURE — 83036 HEMOGLOBIN GLYCOSYLATED A1C: CPT

## 2021-02-11 PROCEDURE — 3066F NEPHROPATHY DOC TX: CPT | Performed by: ORTHOPAEDIC SURGERY

## 2021-02-11 PROCEDURE — 99214 OFFICE O/P EST MOD 30 MIN: CPT | Performed by: PHYSICAL MEDICINE & REHABILITATION

## 2021-02-11 PROCEDURE — 3044F HG A1C LEVEL LT 7.0%: CPT | Performed by: ORTHOPAEDIC SURGERY

## 2021-02-11 RX ORDER — PREGABALIN 150 MG/1
150 CAPSULE ORAL 2 TIMES DAILY
Qty: 60 CAPSULE | Refills: 1 | Status: SHIPPED | OUTPATIENT
Start: 2021-02-11 | End: 2021-06-02 | Stop reason: SDUPTHER

## 2021-02-11 NOTE — PROGRESS NOTES
Assessment  1  Cervical radiculopathy    2  Thrombocytopenia ([facility] [address] )        Plan  1  Obtain platelet count  , we will need platelet count to be the above 100,000 in order to safely proceed with injection  2  We will schedule patient for a right C7-T1 interlaminar epidural steroid injection  Shared decision making did take place in the patient wishes to proceed with this injection  He understands the risks  He understands the requirement for holding his anticoagulants as well  This was previously approved of by his cardiologist    3  We will increase Lyrica to 150 mg twice daily in the meantime  My impressions and treatment recommendations were discussed in detail with the patient who verbalized understanding and had no further questions  Discharge instructions were provided  I personally saw and examined the patient and I agree with the above discussed plan of care  Orders Placed This Encounter   Procedures    FL spine and pain procedure     Standing Status:   Future     Standing Expiration Date:   2/11/2022     Order Specific Question:   Reason for Exam:     Answer:   Right MAG     Order Specific Question:   Anticoagulant hold needed? Answer:   yes, previously approved    Platelet count     Standing Status:   Future     Standing Expiration Date:   2/11/2022     New Medications Ordered This Visit   Medications    pregabalin (LYRICA) 150 mg capsule     Sig: Take 1 capsule (150 mg total) by mouth 2 (two) times a day     Dispense:  60 capsule     Refill:  1       History of Present Illness    James Mcleod is a 63 y o  male   Returns in follow-up to discuss continued neck and radiating right arm pain complaints consistent with cervical radiculopathy  He has not had dramatic benefit with the Lyrica although describes potentially 50% improvement in symptoms  He is hopeful for further relief to be able to function as he did previously    I did have a radha discussion with him related to other options including titrating up the dose further, trialing other medications, or moving forward with the cervical epidural steroid injection  He does continue to have concerns related to potential for stroke  He is willing to undergo those risks for better function at this point however  I have personally reviewed and/or updated the patient's past medical history, past surgical history, family history, social history, current medications, allergies, and vital signs today  Review of Systems   Respiratory: Negative for shortness of breath  Cardiovascular: Negative for chest pain  Gastrointestinal: Negative for constipation, diarrhea, nausea and vomiting  Musculoskeletal: Positive for myalgias, neck pain and neck stiffness  Negative for arthralgias, gait problem and joint swelling  Skin: Negative for rash  Neurological: Negative for dizziness, seizures and weakness  All other systems reviewed and are negative        Patient Active Problem List   Diagnosis    Allergic rhinitis    Atrial fibrillation (HCC)    Chronic hepatitis C virus infection ([place] [address] )    Colon polyp    Type 2 diabetes mellitus ([place] [address] )    Erectile dysfunction    Esophageal reflux    Microalbuminuria    Nocturia    Palpitations    RBBB    Thrombocytopenia (HCC)    Tobacco abuse    Hand dermatitis    Vasculitis of skin    Chronic pain of right ankle    Cerebrovascular accident (CVA) due to embolism of precerebral artery (HCC)    Obesity    Cirrhosis of liver (HCC)    Essential hypertension    Muscle ache    Acute pain of right shoulder    Tinea cruris    Cervical disc disease    Hyperlipidemia    DDD (degenerative disc disease), cervical    Cervical radiculopathy       Past Medical History:   Diagnosis Date    A-fib [facility])     Colon polyp     Diabetes mellitus ([place] [address] )     GERD (gastroesophageal reflux disease)     Hyperlipidemia     Hypertension     Liver disease     Hx Hep C treated    Neck pain     Osteoarthritis  TIA (transient ischemic attack)        Past Surgical History:   Procedure Laterality Date    ROLAN HOLE FOR SUBDURAL HEMATOMA      COLONOSCOPY N/A 2016    Procedure: COLONOSCOPY;  Surgeon: Paresh Walker MD;  Location: [facility]; Service:     INGUINAL HERNIA REPAIR      ORTHOPEDIC SURGERY      SHOULDER SURGERY Left        Family History   Problem Relation Age of Onset    Atrial fibrillation Mother     Hypertension Mother     No Known Problems Father        Social History     Occupational History    Occupation: EMPLOYED   Tobacco Use    Smoking status: Former Smoker     Packs/day: 0 20     Quit date: 2018     Years since quittin 5    Smokeless tobacco: Former User    Tobacco comment: EXPOSURE TO SECOND HAND SMOKE  ALL SCRIPTS ALSO SAYS FORMER SMOKER   Substance and Sexual Activity    Alcohol use: No     Frequency: Never    Drug use: No     Comment: ALL SCRIPTS SAYS ACTIVE    Sexual activity: Yes     Comment: [address] RELATIONSHIP       Current Outpatient Medications on File Prior to Visit   Medication Sig    apixaban (Eliquis) 5 mg Take 1 tablet (5 mg total) by mouth 2 (two) times a day    clotrimazole (LOTRIMIN) 1 % cream Apply topically 2 (two) times a day Continue for 2-4 weeks      digoxin (LANOXIN) 0 25 mg tablet TAKE 1 TABLET DAILY    econazole nitrate 1 % cream Apply topically 2 (two) times a day    lisinopril (ZESTRIL) 2 5 mg tablet TAKE 1 TABLET DAILY    metFORMIN (GLUCOPHAGE) 500 mg tablet TAKE 1 TABLET TWICE A DAY    metoprolol tartrate (LOPRESSOR) 25 mg tablet Take 0 5 tablets (12 5 mg total) by mouth every 12 (twelve) hours    Milk Thistle 250 MG CAPS Take by mouth    omeprazole (PriLOSEC) 20 mg delayed release capsule TAKE 1 CAPSULE DAILY    sildenafil (VIAGRA) 50 MG tablet Take 1 tablet (50 mg total) by mouth daily as needed for erectile dysfunction    tadalafil (CIALIS) 10 MG tablet Take 1 on 2 occasions, and then can increase to 2 on 2 attempts if not helpful   [DISCONTINUED] pregabalin (LYRICA) 75 mg capsule Take 1 capsule (75 mg total) by mouth 2 (two) times a day    atorvastatin (LIPITOR) 40 mg tablet Take 1 tablet (40 mg total) by mouth daily (Patient not taking: Reported on 1/14/2021)    glucose blood (ONE TOUCH ULTRA TEST) test strip 3 (three) times a day    Lancets (ONETOUCH ULTRASOFT) lancets 3 (three) times a day    [DISCONTINUED] metoprolol tartrate (LOPRESSOR) 25 mg tablet TAKE 1/2 TABLETS (12 5 MG TOTAL) BY MOUTH EVERY 12 (TWELVE) HOURS     No current facility-administered medications on file prior to visit  No Known Allergies    Physical Exam    /96   Pulse 91   Temp 97 9 °F (36 6 °C)   Resp 16   Ht 5' 11" (1 803 m)   Wt 98 4 kg (217 lb)   BMI 30 27 kg/m²     Constitutional: normal, well developed, well nourished, alert, in no distress and non-toxic and no overt pain behavior    Eyes: anicteric  HEENT: grossly intact  Neck: supple, symmetric, trachea midline and no masses   Pulmonary:even and unlabored  Cardiovascular:No edema or pitting edema present  Skin:Normal without rashes or lesions and well hydrated  Psychiatric:Mood and affect appropriate  Neurologic:Cranial Nerves II-XII grossly intact  Musculoskeletal:normal, except for continued neck and radiating right arm pain    Imaging

## 2021-02-15 ENCOUNTER — OFFICE VISIT (OUTPATIENT)
Dept: FAMILY MEDICINE CLINIC | Facility: CLINIC | Age: 56
End: 2021-02-15
Payer: COMMERCIAL

## 2021-02-15 ENCOUNTER — TELEPHONE (OUTPATIENT)
Dept: RADIOLOGY | Facility: MEDICAL CENTER | Age: 56
End: 2021-02-15

## 2021-02-15 ENCOUNTER — TRANSCRIBE ORDERS (OUTPATIENT)
Dept: FAMILY MEDICINE CLINIC | Facility: CLINIC | Age: 56
End: 2021-02-15

## 2021-02-15 VITALS
HEIGHT: 71 IN | HEART RATE: 88 BPM | RESPIRATION RATE: 16 BRPM | BODY MASS INDEX: 30.94 KG/M2 | TEMPERATURE: 97.5 F | DIASTOLIC BLOOD PRESSURE: 78 MMHG | WEIGHT: 221 LBS | SYSTOLIC BLOOD PRESSURE: 138 MMHG

## 2021-02-15 DIAGNOSIS — I10 ESSENTIAL HYPERTENSION: ICD-10-CM

## 2021-02-15 DIAGNOSIS — D69.6 THROMBOCYTOPENIA (HCC): ICD-10-CM

## 2021-02-15 DIAGNOSIS — I48.20 CHRONIC ATRIAL FIBRILLATION, UNSPECIFIED (HCC): Primary | ICD-10-CM

## 2021-02-15 DIAGNOSIS — M54.12 CERVICAL RADICULOPATHY: ICD-10-CM

## 2021-02-15 DIAGNOSIS — I12.9 HYPERTENSIVE CHRONIC KIDNEY DISEASE WITH STAGE 1 THROUGH STAGE 4 CHRONIC KIDNEY DISEASE, OR UNSPECIFIED CHRONIC KIDNEY DISEASE: ICD-10-CM

## 2021-02-15 DIAGNOSIS — I48.21 PERMANENT ATRIAL FIBRILLATION (HCC): ICD-10-CM

## 2021-02-15 DIAGNOSIS — M50.90 CERVICAL DISC DISEASE: Primary | ICD-10-CM

## 2021-02-15 DIAGNOSIS — I63.10 CEREBROVASCULAR ACCIDENT (CVA) DUE TO EMBOLISM OF PRECEREBRAL ARTERY (HCC): ICD-10-CM

## 2021-02-15 PROCEDURE — 99214 OFFICE O/P EST MOD 30 MIN: CPT | Performed by: FAMILY MEDICINE

## 2021-02-15 NOTE — ASSESSMENT & PLAN NOTE
Patient does continue to have some significant changes  Per last note at pain management, patient does need to continue to have injection  He is not able to work at this point  Return to work should be per pain management

## 2021-02-15 NOTE — ASSESSMENT & PLAN NOTE
Patient does have atrial fibrillation  He previously had stroke  He will be following with Cardiology in the near future to discuss about whether not he can hold the Eliquis prior to injection  No changes noted at the moment  Slight tachycardia today

## 2021-02-15 NOTE — ASSESSMENT & PLAN NOTE
Stable  No change at the moment  Again, patient previously had embolus causing his CVA  On anticoagulation currently  Will discuss with Cardiology

## 2021-02-15 NOTE — TELEPHONE ENCOUNTER
Eliquis hold approval rec'd from Dr Radha Nye  Signed 2/12/2021 (exp 4/13/2021)  Will have scanned into chart; I have copy       Right MAG  4w f/u with NP

## 2021-02-15 NOTE — PATIENT INSTRUCTIONS
Problem List Items Addressed This Visit     Atrial fibrillation [facility])     Patient does have atrial fibrillation  He previously had stroke  He will be following with Cardiology in the near future to discuss about whether not he can hold the Eliquis prior to injection  No changes noted at the moment  Slight tachycardia today  Cerebrovascular accident (CVA) due to embolism of precerebral artery (HCC)     Stable  No change at the moment  Again, patient previously had embolus causing his CVA  On anticoagulation currently  Will discuss with Cardiology  Cervical disc disease - Primary     Patient does continue to have some significant changes  Per last note at pain management, patient does need to continue to have injection  He is not able to work at this point  Return to work should be per pain management  Cervical radiculopathy     Patient does continue to have some significant changes  Per last note at pain management, patient does need to continue to have injection  He is not able to work at this point  Return to work should be per pain management  Hypertension     Blood pressure today was reasonable  Because of this, no changes recommended  Thrombocytopenia ([address] )          COVID 19 Instructions    Devininodebby Pete was advised to limit contact with others to essential tasks such as getting food, medications, and medical care  Proper handwashing reviewed, and Hand sanitzer when washing is not available  If the patient develops symptoms of COVID 19, the patient should call the office as soon as possible  For [id number] Flu season, it is strongly recommended that Flu Vaccinations be obtained  Please try to download [employer] Du[employer]  Once you do download this on your phone, you will be prompted to add your phone number to the account  After that, he should receive a text from [employer], and use that code to verify your phone number    After that, you should be able to use Google Duo to receive and make video calls  Please download Microsoft Teams to your phone or computer  We will be transitioning to this platform for Video Visits  Instructions for downloading this are available from the office  We are committed to getting you vaccinated as soon as possible and will be closely following CDC and [facility]  guidelines as they are released and revised  Please refer to our COVID-19 vaccine webpage for the most up to date information on the vaccine and our distribution efforts      Glory phillip

## 2021-02-15 NOTE — PROGRESS NOTES
Assessment and Plan:    Problem List Items Addressed This Visit     Atrial fibrillation [facility])     Patient does have atrial fibrillation  He previously had stroke  He will be following with Cardiology in the near future to discuss about whether not he can hold the Eliquis prior to injection  No changes noted at the moment  Slight tachycardia today  Cerebrovascular accident (CVA) due to embolism of precerebral artery (HCC)     Stable  No change at the moment  Again, patient previously had embolus causing his CVA  On anticoagulation currently  Will discuss with Cardiology  Cervical disc disease - Primary     Patient does continue to have some significant changes  Per last note at pain management, patient does need to continue to have injection  He is not able to work at this point  Return to work should be per pain management  Cervical radiculopathy     Patient does continue to have some significant changes  Per last note at pain management, patient does need to continue to have injection  He is not able to work at this point  Return to work should be per pain management  Hypertension     Blood pressure today was reasonable  Because of this, no changes recommended  Thrombocytopenia ([address] )                 Diagnoses and all orders for this visit:    Cervical disc disease    Cervical radiculopathy    Permanent atrial fibrillation (HCC)    Cerebrovascular accident (CVA) due to embolism of precerebral artery ([address] )    Essential hypertension    Thrombocytopenia (HCC)              Subjective:      Patient ID: Maribell Sams is a 63 y o  male  CC:    Chief Complaint   Patient presents with    Follow-up     pt here for a follow up on his neck pain  WILBERTO Jha       HPI:    Patient here to follow up on his neck pain  He was at pain management  He is still having increased pain  He is not able to do much currently  He did get a back machine (inversion table)    At this time, the patient mentioned that the significant pain that he is having in the neck, as well as the arms is less than what it was while he was working, but he is not currently working and therefore not causing the same amount of stressors on his body  Reviewed less pain management note  They are planning on injections, but need to make sure that his CBC is okay, and that he is able to be off anticoagulation  Anticoagulation is pending per Cardiology  Atrial fibrillation:  Patient is on Eliquis currently  Also on digoxin  Thrombocytopenia noted before  Last note from pain management requests CBC  Hypertension:  Currently on lisinopril, metoprolol  The following portions of the patient's history were reviewed and updated as appropriate: allergies, current medications, past family history, past medical history, past social history, past surgical history and problem list       Review of Systems   Constitutional: Negative  HENT: Negative  Eyes: Negative  Respiratory: Negative  Cardiovascular: Negative  Gastrointestinal: Negative  Endocrine: Negative  Genitourinary: Negative  Musculoskeletal: Positive for back pain, neck pain and neck stiffness  Skin: Negative  Allergic/Immunologic: Negative  Neurological: Negative  Hematological: Negative  Psychiatric/Behavioral: Negative  Data to review:       Objective:    Vitals:    02/15/21 0929   BP: 138/78   BP Location: Left arm   Patient Position: Sitting   Cuff Size: Large   Pulse: 88   Resp: 16   Temp: 97 5 °F (36 4 °C)   TempSrc: Temporal   Weight: 100 kg (221 lb)   Height: 5' 11" (1 803 m)        Physical Exam  Vitals signs and nursing note reviewed  Constitutional:       Appearance: Normal appearance  Neck:      Vascular: No carotid bruit  Cardiovascular:      Rate and Rhythm: Normal rate and regular rhythm  Pulses: Normal pulses             Carotid pulses are 2+ on the right side and 2+ on the left side  Heart sounds: Normal heart sounds  No murmur  No gallop  Pulmonary:      Effort: Pulmonary effort is normal  No respiratory distress  Breath sounds: Normal breath sounds  No stridor  No wheezing, rhonchi or rales  Chest:      Chest wall: No tenderness  Musculoskeletal:      Cervical back: He exhibits normal range of motion and no tenderness  Neurological:      Mental Status: He is alert  BMI Counseling: Body mass index is 30 82 kg/m²  The BMI is above normal  Nutrition recommendations include decreasing portion sizes, encouraging healthy choices of fruits and vegetables, decreasing fast food intake, consuming healthier snacks, limiting drinks that contain sugar, moderation in carbohydrate intake, increasing intake of lean protein, reducing intake of saturated and trans fat and reducing intake of cholesterol  Exercise recommendations include exercising 3-5 times per week  No pharmacotherapy was ordered

## 2021-02-16 ENCOUNTER — TELEPHONE (OUTPATIENT)
Dept: CARDIOLOGY CLINIC | Facility: CLINIC | Age: 56
End: 2021-02-16

## 2021-02-16 DIAGNOSIS — L30.9 HAND DERMATITIS: ICD-10-CM

## 2021-02-16 NOTE — TELEPHONE ENCOUNTER
Pt calling making sure Dr Christelle Ruano received form from Dr Calin Parmar office  Told received 2/11 and returned 2/12/21

## 2021-02-16 NOTE — TELEPHONE ENCOUNTER
This medication is not for eczema, it is rather for fungal rash  If patient is continuing to have a fungal rash, should have office visit to review

## 2021-02-17 NOTE — TELEPHONE ENCOUNTER
Patient is returning nurses call; please call him back @ [phone]  He's requesting to schedule his injection

## 2021-02-19 ENCOUNTER — TELEPHONE (OUTPATIENT)
Dept: FAMILY MEDICINE CLINIC | Facility: CLINIC | Age: 56
End: 2021-02-19

## 2021-02-19 NOTE — TELEPHONE ENCOUNTER
PT IS ASKING IF HIS DISABILITY PAPERWORK COULD BE SIGNED AND FAXED AND THE THE ORIGINALS BE MAILED TO HIM AS SOON AS POSSIBLE  HE STATES IT HAS BEEN OVER A MONTH AND HE STILL HAS NOT GOTTEN PAID  PLEASE TAKE CARE OF THIS TODAY IF YOU CAN  HE STATES A MONTH SHOULD BE LONG ENOUGH TO HAVE A FORM SIGNED AND FAXED  ANY QUESTIONS CALL PT [phone]

## 2021-02-22 NOTE — TELEPHONE ENCOUNTER
Patient states he does not need any cream right now, the rash on his hands are almost gone since he's not working now for a few months  I advised he f/u with his Dermatologist for refills

## 2021-02-23 ENCOUNTER — OFFICE VISIT (OUTPATIENT)
Dept: CARDIOLOGY CLINIC | Facility: CLINIC | Age: 56
End: 2021-02-23
Payer: COMMERCIAL

## 2021-02-23 ENCOUNTER — TELEPHONE (OUTPATIENT)
Dept: FAMILY MEDICINE CLINIC | Facility: CLINIC | Age: 56
End: 2021-02-23

## 2021-02-23 ENCOUNTER — APPOINTMENT (OUTPATIENT)
Dept: LAB | Facility: MEDICAL CENTER | Age: 56
End: 2021-02-23
Payer: COMMERCIAL

## 2021-02-23 VITALS
BODY MASS INDEX: 30.52 KG/M2 | WEIGHT: 218 LBS | TEMPERATURE: 97 F | SYSTOLIC BLOOD PRESSURE: 144 MMHG | HEIGHT: 71 IN | DIASTOLIC BLOOD PRESSURE: 86 MMHG | RESPIRATION RATE: 16 BRPM | HEART RATE: 72 BPM

## 2021-02-23 DIAGNOSIS — I10 ESSENTIAL HYPERTENSION: ICD-10-CM

## 2021-02-23 DIAGNOSIS — I48.20 CHRONIC ATRIAL FIBRILLATION, UNSPECIFIED (HCC): ICD-10-CM

## 2021-02-23 DIAGNOSIS — I45.10 RBBB: ICD-10-CM

## 2021-02-23 DIAGNOSIS — I48.21 PERMANENT ATRIAL FIBRILLATION (HCC): Primary | ICD-10-CM

## 2021-02-23 DIAGNOSIS — B18.2 CHRONIC HEPATITIS C WITHOUT HEPATIC COMA (HCC): ICD-10-CM

## 2021-02-23 DIAGNOSIS — I12.9 HYPERTENSIVE CHRONIC KIDNEY DISEASE WITH STAGE 1 THROUGH STAGE 4 CHRONIC KIDNEY DISEASE, OR UNSPECIFIED CHRONIC KIDNEY DISEASE: ICD-10-CM

## 2021-02-23 DIAGNOSIS — D69.6 THROMBOCYTOPENIA (HCC): ICD-10-CM

## 2021-02-23 LAB
INR PPP: 1.35 (ref 0.84–1.19)
PLATELET # BLD AUTO: 126 THOUSANDS/UL (ref 149–390)
PMV BLD AUTO: 10.4 FL (ref 8.9–12.7)
PROTHROMBIN TIME: 16.7 SECONDS (ref 11.6–14.5)

## 2021-02-23 PROCEDURE — 36415 COLL VENOUS BLD VENIPUNCTURE: CPT

## 2021-02-23 PROCEDURE — 99213 OFFICE O/P EST LOW 20 MIN: CPT | Performed by: INTERNAL MEDICINE

## 2021-02-23 PROCEDURE — 85610 PROTHROMBIN TIME: CPT

## 2021-02-23 PROCEDURE — 85049 AUTOMATED PLATELET COUNT: CPT

## 2021-02-23 PROCEDURE — 93000 ELECTROCARDIOGRAM COMPLETE: CPT | Performed by: INTERNAL MEDICINE

## 2021-02-23 NOTE — PROGRESS NOTES
Cardiology Follow Up    Stephanie Freeman  1965  [id number]  [address] [facility] [id number] [phone]    Reason for visit:  1 year follow-up for chronic atrial fibrillation with history of embolic TIA  Also has hypertension and right bundle branch block  1  Permanent atrial fibrillation ([place] [address] )     2  Essential hypertension     3  RBBB     4  Chronic atrial fibrillation, unspecified ([place] [address] )  Ambulatory referral to Cardiology   5  Hypertensive chronic kidney disease with stage 1 through stage 4 chronic kidney disease, or unspecified chronic kidney disease  Ambulatory referral to Cardiology       Interval History:   Since the patient's last visit, he has done well    He denies chest pain, shortness of breath, palpitations, edema or lightheadedness      Patient Active Problem List   Diagnosis    Allergic rhinitis    Atrial fibrillation (HCC)    Chronic hepatitis C virus infection ([place] [address] )    Colon polyp    Type 2 diabetes mellitus (HCC)    Erectile dysfunction    Esophageal reflux    Microalbuminuria    Nocturia    Palpitations    RBBB    Thrombocytopenia (HCC)    Tobacco abuse    Hand dermatitis    Vasculitis of skin    Chronic pain of right ankle    Cerebrovascular accident (CVA) due to embolism of precerebral artery (HCC)    Obesity    Cirrhosis of liver (HCC)    Hypertension    Muscle ache    Acute pain of right shoulder    Tinea cruris    Cervical disc disease    Hyperlipidemia    DDD (degenerative disc disease), cervical    Cervical radiculopathy     Past Medical History:   Diagnosis Date    A-fib ([place] [address] )     Colon polyp     Diabetes mellitus ([place] [address] )     GERD (gastroesophageal reflux disease)     Hyperlipidemia     Hypertension     Liver disease     Hx Hep C treated    Neck pain     Osteoarthritis     TIA (transient ischemic attack)      Social History     Socioeconomic History  Marital status: Single     Spouse name: Not on file    Number of children: Not on file    Years of education: Not on file    Highest education level: Not on file   Occupational History    Occupation: EMPLOYED   Social Needs    Financial resource strain: Not on file    Food insecurity     Worry: Not on file     Inability: Not on file    Transportation needs     Medical: Not on file     Non-medical: Not on file   Tobacco Use    Smoking status: Former Smoker     Packs/day: 0 20     Quit date: 2018     Years since quittin 5    Smokeless tobacco: Former User    Tobacco comment: [address] [place] [address] SAYS FORMER SMOKER   Substance and Sexual Activity    Alcohol use: No     Frequency: Never    Drug use: No     Comment: [employer] SAYS ACTIVE    Sexual activity: Yes     Comment: [address] [place] [address] Bypass RELATIONSHIP   Lifestyle    Physical activity     Days per week: Not on file     Minutes per session: Not on file    Stress: Not on file   Relationships    Social connections     Talks on phone: Not on file     Gets together: Not on file     Attends Sabianist service: Not on file     Active member of club or organization: Not on file     Attends meetings of clubs or organizations: Not on file     Relationship status: Not on file    Intimate partner violence     Fear of current or ex partner: Not on file     Emotionally abused: Not on file     Physically abused: Not on file     Forced sexual activity: Not on file   Other Topics Concern    Not on file   Social History Narrative    Not on file      Family History   Problem Relation Age of Onset    Atrial fibrillation Mother     Hypertension Mother     No Known Problems Father      Past Surgical History:   Procedure Laterality Date    ROLAN HOLE FOR SUBDURAL HEMATOMA      COLONOSCOPY N/A 2016    Procedure: COLONOSCOPY;  Surgeon: Tray Sosa MD;  Location: [facility];   Service:    Jessica Arellano 9003 REPAIR      ORTHOPEDIC SURGERY      SHOULDER SURGERY Left        Current Outpatient Medications:     apixaban (Eliquis) 5 mg, Take 1 tablet (5 mg total) by mouth 2 (two) times a day, Disp: 180 tablet, Rfl: 2    digoxin (LANOXIN) 0 25 mg tablet, TAKE 1 TABLET DAILY, Disp: 90 tablet, Rfl: 3    glucose blood (ONE TOUCH ULTRA TEST) test strip, 3 (three) times a day, Disp: , Rfl:     Lancets (ONETOUCH ULTRASOFT) lancets, 3 (three) times a day, Disp: , Rfl:     lisinopril (ZESTRIL) 2 5 mg tablet, TAKE 1 TABLET DAILY, Disp: 90 tablet, Rfl: 3    metFORMIN (GLUCOPHAGE) 500 mg tablet, TAKE 1 TABLET TWICE A DAY, Disp: 180 tablet, Rfl: 3    metoprolol tartrate (LOPRESSOR) 25 mg tablet, Take 0 5 tablets (12 5 mg total) by mouth every 12 (twelve) hours, Disp: 90 tablet, Rfl: 3    Milk Thistle 250 MG CAPS, Take by mouth, Disp: , Rfl:     omeprazole (PriLOSEC) 20 mg delayed release capsule, TAKE 1 CAPSULE DAILY, Disp: 90 capsule, Rfl: 3    pregabalin (LYRICA) 150 mg capsule, Take 1 capsule (150 mg total) by mouth 2 (two) times a day, Disp: 60 capsule, Rfl: 1    sildenafil (VIAGRA) 50 MG tablet, Take 1 tablet (50 mg total) by mouth daily as needed for erectile dysfunction, Disp: 10 tablet, Rfl: 0    tadalafil (CIALIS) 10 MG tablet, Take 1 on 2 occasions, and then can increase to 2 on 2 attempts if not helpful , Disp: 10 tablet, Rfl: 0    atorvastatin (LIPITOR) 40 mg tablet, Take 1 tablet (40 mg total) by mouth daily (Patient not taking: Reported on 1/14/2021), Disp: 90 tablet, Rfl: 3    clotrimazole (LOTRIMIN) 1 % cream, Apply topically 2 (two) times a day Continue for 2-4 weeks  (Patient not taking: Reported on 2/23/2021), Disp: 60 g, Rfl: 0    econazole nitrate 1 % cream, Apply topically 2 (two) times a day (Patient not taking: Reported on 2/23/2021), Disp: 30 g, Rfl: 0  No Known Allergies    Review of Systems:  Review of Systems   Constitutional: Negative for appetite change, fatigue and unexpected weight change  Respiratory: Negative for cough, shortness of breath and wheezing  Cardiovascular: Negative for chest pain, palpitations and leg swelling  Gastrointestinal: Negative for blood in stool, constipation and diarrhea  Genitourinary: Positive for frequency  Negative for hematuria  Musculoskeletal: Positive for back pain  Negative for arthralgias  Neurological: Negative for dizziness and light-headedness  Physical Exam:  Vitals:    02/23/21 1435   BP: 144/86   Pulse: 72   Resp: 16   Temp: (!) 97 °F (36 1 °C)   Weight: 98 9 kg (218 lb)   Height: 5' 11" (1 803 m)       Physical Exam  Constitutional:       General: He is not in acute distress  Appearance: He is obese  HENT:      Head: Normocephalic and atraumatic  Mouth/Throat:      Mouth: Mucous membranes are moist       Pharynx: No oropharyngeal exudate or posterior oropharyngeal erythema  Eyes:      General: No scleral icterus  Conjunctiva/sclera: Conjunctivae normal    Neck:      Musculoskeletal: Neck supple  Thyroid: No thyroid mass or thyromegaly  Vascular: Normal carotid pulses  No carotid bruit or JVD  Cardiovascular:      Rate and Rhythm: Normal rate  Rhythm irregular  Pulses: Normal pulses  Heart sounds: No murmur  No friction rub  No gallop  Pulmonary:      Breath sounds: Normal breath sounds  No wheezing, rhonchi or rales  Abdominal:      Palpations: Abdomen is soft  There is no hepatomegaly, splenomegaly or mass  Tenderness: There is no abdominal tenderness  Musculoskeletal:         General: No swelling  Discussion/Summary:  1  Permanent AFib  Rate well controlled on digoxin 0 25 mg daily metoprolol tartrate 12 5 mg b i d   Patient on Eliquis for stroke prophylaxis  He will be stopping this for an injection next week  He clearly does understand the risks of stopping it but has fairly significant pain    I have been in contact with his pain management specialist and he will resume Eliquis as soon as feasible postprocedure  2  Hypertension  Blood pressure mildly elevated on lisinopril 2 5 mg a day and metoprolol 12 5 mg b i d     Will watch going for but may need intensification of therapy  3  Right bundle branch block  No evidence for bradycardia arrhythmia    Will order digoxin level    Follow-up 1 year      Ariella Arechiga MD

## 2021-02-23 NOTE — TELEPHONE ENCOUNTER
I let pt know that we will call him when the paperwork is completed and he states that he will just keep calling us

## 2021-02-25 ENCOUNTER — TELEPHONE (OUTPATIENT)
Dept: PAIN MEDICINE | Facility: CLINIC | Age: 56
End: 2021-02-25

## 2021-03-01 NOTE — TELEPHONE ENCOUNTER
RN attempted to reach pt regarding previous  [facility] with c/b number office hours and location provided  If pt calls back, his PT/INR, Platelets and Hemoglobin A1C ordered per Dr Sierra Nieves will be printed and able to be picked up at  [address] [facility]

## 2021-03-01 NOTE — TELEPHONE ENCOUNTER
Patient requesting a copy of his blood work that Dr Jeanmarie Alves order  Please follow up with patient

## 2021-03-01 NOTE — TELEPHONE ENCOUNTER
--if this pt calls back, does he have My Chart? The labs and results would be available to him  If not and he wants them printed, does he want to pick them up? Have them faxed somewhere or have them mailed?

## 2021-03-01 NOTE — TELEPHONE ENCOUNTER
Patient called stating he would like to  his lab results from the office   Please advise,guy    Call back# [phone]

## 2021-03-02 ENCOUNTER — TELEPHONE (OUTPATIENT)
Dept: PAIN MEDICINE | Facility: CLINIC | Age: 56
End: 2021-03-02

## 2021-03-03 ENCOUNTER — OFFICE VISIT (OUTPATIENT)
Dept: FAMILY MEDICINE CLINIC | Facility: CLINIC | Age: 56
End: 2021-03-03
Payer: COMMERCIAL

## 2021-03-03 VITALS
HEART RATE: 80 BPM | HEIGHT: 71 IN | WEIGHT: 218 LBS | BODY MASS INDEX: 30.52 KG/M2 | SYSTOLIC BLOOD PRESSURE: 138 MMHG | RESPIRATION RATE: 16 BRPM | DIASTOLIC BLOOD PRESSURE: 74 MMHG | TEMPERATURE: 97.2 F

## 2021-03-03 DIAGNOSIS — B35.1 ONYCHOMYCOSIS: ICD-10-CM

## 2021-03-03 DIAGNOSIS — L30.9 HAND DERMATITIS: Primary | ICD-10-CM

## 2021-03-03 PROCEDURE — 99213 OFFICE O/P EST LOW 20 MIN: CPT | Performed by: FAMILY MEDICINE

## 2021-03-03 NOTE — ASSESSMENT & PLAN NOTE
Irritation on the left hand, with minimal on the right  There is some cracking  Brings up the possibility of eczema  Patient has an appointment set up with Dermatology  Would recommend follow-up with Dermatology

## 2021-03-03 NOTE — PROGRESS NOTES
Assessment and Plan:    Problem List Items Addressed This Visit     Hand dermatitis - Primary     Irritation on the left hand, with minimal on the right  There is some cracking  Brings up the possibility of eczema  Patient has an appointment set up with Dermatology  Would recommend follow-up with Dermatology  Relevant Orders    Ambulatory referral to Dermatology    Onychomycosis     Patient appears to have onychomycosis of the left hand  There several fingernails involved  In the past, he reports he was on a cream from Dermatology  He has an appointment set with dermatology, so I would agree that he should go there and have evaluation with them  Relevant Orders    Ambulatory referral to Dermatology                 Diagnoses and all orders for this visit:    Hand dermatitis  -     Ambulatory referral to Dermatology; Future    Onychomycosis  -     Ambulatory referral to Dermatology; Future              Subjective:      Patient ID: Anni Moss is a 63 y o  male  CC:    Chief Complaint   Patient presents with    Follow-up     Pt here for a follow up and to review lab results  Also needs referral for dionisio Jha       HPI:    Patient has had some irritation on the hands  He has significant changes in the nails on the left hand  Has been present for a while  In the past, he had been on topical creams from Dermatology  He felt the creams did help out quite a bit  He would like to go back to Dermatology to re-evaluate  Patient also has some irritation on the skin next to the nails  This is noted on both the left and right hands, however  The following portions of the patient's history were reviewed and updated as appropriate: allergies, current medications and problem list       Review of Systems   Constitutional: Negative  HENT: Negative  Skin: Positive for rash           Data to review:       Objective:    Vitals:    03/03/21 1157   BP: 138/74   BP Location: Left arm Patient Position: Sitting   Cuff Size: Large   Pulse: 80   Resp: 16   Temp: (!) 97 2 °F (36 2 °C)   TempSrc: Temporal   Weight: 98 9 kg (218 lb)   Height: 5' 11" (1 803 m)        Physical Exam  Vitals signs and nursing note reviewed  Skin:     Comments: Irritation on the left hand around the nails, and on the finger tips  Some cracking noted  Fingernails on the thumb, 3rd 4th and 5th fingers are somewhat thickened, brittle, cracking

## 2021-03-03 NOTE — PATIENT INSTRUCTIONS
Problem List Items Addressed This Visit     Hand dermatitis - Primary     Irritation on the left hand, with minimal on the right  There is some cracking  Brings up the possibility of eczema  Patient has an appointment set up with Dermatology  Would recommend follow-up with Dermatology  Relevant Orders    Ambulatory referral to Dermatology    Onychomycosis     Patient appears to have onychomycosis of the left hand  There several fingernails involved  In the past, he reports he was on a cream from Dermatology  He has an appointment set with dermatology, so I would agree that he should go there and have evaluation with them  Relevant Orders    Ambulatory referral to Dermatology          COVID 19 Instructions    Taisha Marion was advised to limit contact with others to essential tasks such as getting food, medications, and medical care  Proper handwashing reviewed, and Hand sanitzer when washing is not available  If the patient develops symptoms of COVID 19, the patient should call the office as soon as possible  For [id number] Flu season, it is strongly recommended that Flu Vaccinations be obtained  Please try to download [employer] Duo  Once you do download this on your phone, you will be prompted to add your phone number to the account  After that, he should receive a text from [employer], and use that code to verify your phone number  After that, you should be able to use Google Duo to receive and make video calls  Please download Microsoft Teams to your phone or computer  We will be transitioning to this platform for Video Visits  Instructions for downloading this are available from the office  We are committed to getting you vaccinated as soon as possible and will be closely following CDC and [facility]  guidelines as they are released and revised    Please refer to our COVID-19 vaccine webpage for the most up to date information on the vaccine and our distribution efforts      Glory phillip

## 2021-03-03 NOTE — ASSESSMENT & PLAN NOTE
Patient appears to have onychomycosis of the left hand  There several fingernails involved  In the past, he reports he was on a cream from Dermatology  He has an appointment set with dermatology, so I would agree that he should go there and have evaluation with them

## 2021-03-10 ENCOUNTER — OFFICE VISIT (OUTPATIENT)
Dept: PAIN MEDICINE | Facility: MEDICAL CENTER | Age: 56
End: 2021-03-10
Payer: COMMERCIAL

## 2021-03-10 ENCOUNTER — TELEPHONE (OUTPATIENT)
Dept: CARDIOLOGY CLINIC | Facility: CLINIC | Age: 56
End: 2021-03-10

## 2021-03-10 ENCOUNTER — TELEPHONE (OUTPATIENT)
Dept: PAIN MEDICINE | Facility: MEDICAL CENTER | Age: 56
End: 2021-03-10

## 2021-03-10 VITALS
WEIGHT: 219 LBS | TEMPERATURE: 98.4 F | SYSTOLIC BLOOD PRESSURE: 142 MMHG | HEIGHT: 71 IN | DIASTOLIC BLOOD PRESSURE: 93 MMHG | HEART RATE: 99 BPM | BODY MASS INDEX: 30.66 KG/M2

## 2021-03-10 DIAGNOSIS — M50.30 DDD (DEGENERATIVE DISC DISEASE), CERVICAL: ICD-10-CM

## 2021-03-10 DIAGNOSIS — M54.12 CERVICAL RADICULOPATHY: Primary | ICD-10-CM

## 2021-03-10 PROCEDURE — 99213 OFFICE O/P EST LOW 20 MIN: CPT | Performed by: NURSE PRACTITIONER

## 2021-03-10 NOTE — TELEPHONE ENCOUNTER
Pt called "his situation" is that he was told his blood is too thin,could not get injection and the pain management told him he needs an operation and he wants medication  He was at pain management today and they blamed Dr Jamie Kemp for reason he could not get injection  but according to LOV he was gonna hold Eliquis get injection and they go back on Eliquis  He was very agitated about "his situation" Explained to him that he needs to discuss in detail why they didn't want him to have/cancelled planned injection  He was given fax number if Dr Dev Jones office needs to send information about injection and Eliquis hold from their office to ours   [place]

## 2021-03-10 NOTE — TELEPHONE ENCOUNTER
We cannot do an injection given his PT/INR level from his liver disease  I was really hoping we could make this work out for him but it's not an option from me  He is certainly welcome to seek out a second opinion  I do think it's reasonable to seek out the surgical consultation as they have things that can be given for surgery in a hospital setting that can help with his coagulation risks  He may not be a surgical candidate but that's for the surgeon to determine not me  I certainly understand his frustration with this situation and I wish I could make it easier but unfortunately this is how things stand at this point

## 2021-03-10 NOTE — TELEPHONE ENCOUNTER
S/w pt who states that he was seen by our office today, and he would like to get scheduled for an injection w/ Dr Thom Blanco  Pt states that he was told his platelets were okay last time they were checked, so he doesn't understand why his previous injection was cancelled  Pt was advised that yes, his platelets were greater than 100,000 in 2/23 lab draw  However, pt's INR was elevated at 1 35, which indicated pt's blood was too thin to safely perform epidural injections, INR needs to be less than or equal to 1 2 in order to proceed  RN read SIMONE's notation from 11/23 telephone note which explained that his procedure had to be cancelled, INR was too high and that PT/INR remains elevated due to pre-existing liver condition, can move forward w/ med management with our office if pt would like  Pt verbalized understanding  Pt states that he was referred for a surgical consultation, scheduled w/ Dr Claudette Barrientos on 4/5/21  Pt states that he would still rather get an injection than surgery, wondering if there is any possibility of being able to get an injection? Pt was advised that RN will need to discuss w/ SIMONE, there is concern regarding his platelet count, PT/INR, and risk of stroke off anticoagulants  Pt states that if he cannot get an injection and surgery is his only option, he is wondering if his lab values will stop him from being able to safely get surgery as well? RN advised pt that any concerns he has directly related to surgery will need to be discussed w/ Dr Claudette Barrientos on 4/5, but will send this information to [address] [place] [address] and see if he has any insight, will c/b w/ more information  Pt appreciative  Please advise  Thank you

## 2021-03-10 NOTE — TELEPHONE ENCOUNTER
Reviewed chart    unfortunately despite holding eliquis for 3 days (form completed by me pre procedure-discussed with Dr Marbella Robles as well)  his INR was above their parameters    nothing I can do from my end except allow longer hold which may or may not work    would rather not do that unless they are committed to doing it since he had a stroke off AC  Lino Barajas

## 2021-03-10 NOTE — PROGRESS NOTES
Assessment  1  Cervical radiculopathy    2  DDD (degenerative disc disease), cervical        Plan    At this time I did explain that unfortunately we cannot safely move forward with his MAG because his INR is too high  I did offer the patient to adjust his dose of Lyrica to try and provide better relief however he is not interested he tells me does not help anyway , even though he tells me his arm pain has improved since starting the medication  Patient did not require refill of his Lyrica today  Patient tells me that he just wants to have surgery so I will refer him back to Dr Julian Dempsey who had previously seen  Follow-up with us as needed to refilled Lyrica        My impressions and treatment recommendations were discussed in detail with the patient who verbalized understanding and had no further questions  Discharge instructions were provided  I personally saw and examined the patient and I agree with the above discussed plan of care  Orders Placed This Encounter   Procedures    Ambulatory referral to Orthopedic Surgery     Standing Status:   Future     Standing Expiration Date:   3/10/2022     Referral Priority:   Routine     Referral Type:   Consult - AMB     Referral Reason:   Specialty Services Required     Referred to Provider:   Kandis Hernandez MD     Requested Specialty:   Orthopedic Surgery     Number of Visits Requested:   1     Expiration Date:   3/10/2022     No orders of the defined types were placed in this encounter  History of Present Illness    Sarah Styles is a 61 y o  male  Presents for follow-up related to his chronic neck and arm pain  Today he rates his pain 5/10 this is occasional most bothersome in the morning  He describes the pain as throbbing, and shooting  Patient is very annoyed by the fact that he feels that our office for got about him and he tells me that he had to come into the office is schedule this appointment on his own    I did explain to Him that he was scheduled for a cervical epidural steroid injection and we were awaiting a hold from his cardiologist for the Eliquis  Once we received his lab work back his platelets were appropriate to move forward however his INR was too high  I also reviewed with him that our nurses went over all this with him on a phone call and then scheduled his follow-up that he is at today over the telephone  Patient continues to disagree even though I have documentation during today's visit  Patient is using Lyrica 150 mg twice a day he tells me that since starting the medication his arm pain has improved however he  Will also say that the medications not working  I have personally reviewed and/or updated the patient's past medical history, past surgical history, family history, social history, current medications, allergies, and vital signs today  Review of Systems   Respiratory: Negative for shortness of breath  Cardiovascular: Negative for chest pain  Gastrointestinal: Negative for constipation, diarrhea, nausea and vomiting  Musculoskeletal: Positive for neck pain and neck stiffness  Negative for arthralgias, gait problem, joint swelling and myalgias  Skin: Negative for rash  Neurological: Negative for dizziness, seizures and weakness  All other systems reviewed and are negative        Patient Active Problem List   Diagnosis    Allergic rhinitis    Atrial fibrillation (HCC)    Chronic hepatitis C virus infection ([facility] [address] )    Colon polyp    Type 2 diabetes mellitus ([facility] [address] )    Erectile dysfunction    Esophageal reflux    Microalbuminuria    Nocturia    Palpitations    RBBB    Thrombocytopenia (HCC)    Tobacco abuse    Hand dermatitis    Vasculitis of skin    Chronic pain of right ankle    Cerebrovascular accident (CVA) due to embolism of precerebral artery (HCC)    Obesity    Cirrhosis of liver (HCC)    Hypertension    Muscle ache    Acute pain of right shoulder    Tinea cruris  Cervical disc disease    Hyperlipidemia    DDD (degenerative disc disease), cervical    Cervical radiculopathy    Onychomycosis       Past Medical History:   Diagnosis Date    [facility]-fib [facility])     Colon polyp     Diabetes mellitus ([address] )     GERD (gastroesophageal reflux disease)     Hyperlipidemia     Hypertension     Liver disease     Hx Hep C treated    Neck pain     Osteoarthritis     TIA (transient ischemic attack)        Past Surgical History:   Procedure Laterality Date    ROLAN HOLE FOR SUBDURAL HEMATOMA      COLONOSCOPY N/A 2016    Procedure: COLONOSCOPY;  Surgeon: Jose Armando Smith MD;  Location: [facility];   Service:     INGUINAL HERNIA REPAIR      ORTHOPEDIC SURGERY      SHOULDER SURGERY Left        Family History   Problem Relation Age of Onset    Atrial fibrillation Mother     Hypertension Mother     No Known Problems Father        Social History     Occupational History    Occupation: EMPLOYED   Tobacco Use    Smoking status: Former Smoker     Packs/day: 0 20     Quit date: 2018     Years since quittin 5    Smokeless tobacco: Former User   Substance and Sexual Activity    Alcohol use: No     Frequency: Never    Drug use: No     Comment: [employer] SAYS ACTIVE    Sexual activity: Yes     Comment: [address] RELATIONSHIP       Current Outpatient Medications on File Prior to Visit   Medication Sig    apixaban (Eliquis) 5 mg Take 1 tablet (5 mg total) by mouth 2 (two) times a day    digoxin (LANOXIN) 0 25 mg tablet TAKE 1 TABLET DAILY    lisinopril (ZESTRIL) 2 5 mg tablet TAKE 1 TABLET DAILY    metFORMIN (GLUCOPHAGE) 500 mg tablet TAKE 1 TABLET TWICE A DAY    metoprolol tartrate (LOPRESSOR) 25 mg tablet Take 0 5 tablets (12 5 mg total) by mouth every 12 (twelve) hours    Milk Thistle 250 MG CAPS Take by mouth    omeprazole (PriLOSEC) 20 mg delayed release capsule TAKE 1 CAPSULE DAILY    pregabalin (LYRICA) 150 mg capsule Take 1 capsule (150 mg total) by mouth 2 (two) times a day    sildenafil (VIAGRA) 50 MG tablet Take 1 tablet (50 mg total) by mouth daily as needed for erectile dysfunction    tadalafil (CIALIS) 10 MG tablet Take 1 on 2 occasions, and then can increase to 2 on 2 attempts if not helpful   atorvastatin (LIPITOR) 40 mg tablet Take 1 tablet (40 mg total) by mouth daily (Patient not taking: Reported on 1/14/2021)    clotrimazole (LOTRIMIN) 1 % cream Apply topically 2 (two) times a day Continue for 2-4 weeks  (Patient not taking: Reported on 2/23/2021)    econazole nitrate 1 % cream Apply topically 2 (two) times a day (Patient not taking: Reported on 2/23/2021)    glucose blood (ONE TOUCH ULTRA TEST) test strip 3 (three) times a day    Lancets (ONETOUCH ULTRASOFT) lancets 3 (three) times a day    [DISCONTINUED] metoprolol tartrate (LOPRESSOR) 25 mg tablet TAKE 1/2 TABLETS (12 5 MG TOTAL) BY MOUTH EVERY 12 (TWELVE) HOURS     No current facility-administered medications on file prior to visit  No Known Allergies    Physical Exam    /93   Pulse 99   Temp 98 4 °F (36 9 °C)   Ht 5' 11" (1 803 m)   Wt 99 3 kg (219 lb)   BMI 30 54 kg/m²     Constitutional: normal, well developed, well nourished, alert, in no distress and non-toxic and no overt pain behavior    Eyes: anicteric  HEENT: grossly intact  Neck: supple, symmetric, trachea midline and no masses   Pulmonary:even and unlabored  Cardiovascular:No edema or pitting edema present  Skin:Normal without rashes or lesions and well hydrated  Psychiatric:Mood and affect appropriate  Neurologic:Cranial Nerves II-XII grossly intact  Musculoskeletal:normal         Imaging

## 2021-03-10 NOTE — TELEPHONE ENCOUNTER
Pt had a appointment with AO today  Pt called here stating he has pain in his neck and needs a injection  Pt states Dr Eagle Yang (heart Dr)-stated to pt he can go off the [place] for a few days so he can get a injection in his neck  Pt thinks there is a miss communication here and would like to get the injection as soon as possible      Dr Eagle Yang # [phone]  Fax # [phone]    Pt # [phone]

## 2021-03-12 NOTE — TELEPHONE ENCOUNTER
--[place]--    RN s/w pt regarding previous  Per pt he didn't understand before but the nurse who explained it to him made him understand and he appreciated this  Pt is seeing Dr Nikunj Garcia on 3/22

## 2021-03-22 ENCOUNTER — APPOINTMENT (OUTPATIENT)
Dept: LAB | Facility: MEDICAL CENTER | Age: 56
End: 2021-03-22
Payer: COMMERCIAL

## 2021-03-22 ENCOUNTER — OFFICE VISIT (OUTPATIENT)
Dept: OBGYN CLINIC | Facility: HOSPITAL | Age: 56
End: 2021-03-22
Payer: COMMERCIAL

## 2021-03-22 VITALS
BODY MASS INDEX: 30.66 KG/M2 | HEIGHT: 71 IN | DIASTOLIC BLOOD PRESSURE: 108 MMHG | WEIGHT: 219 LBS | SYSTOLIC BLOOD PRESSURE: 165 MMHG | HEART RATE: 105 BPM

## 2021-03-22 DIAGNOSIS — M50.30 DDD (DEGENERATIVE DISC DISEASE), CERVICAL: ICD-10-CM

## 2021-03-22 DIAGNOSIS — M54.12 CERVICAL RADICULOPATHY: ICD-10-CM

## 2021-03-22 DIAGNOSIS — M50.30 DDD (DEGENERATIVE DISC DISEASE), CERVICAL: Primary | ICD-10-CM

## 2021-03-22 LAB
INR PPP: 1.18 (ref 0.84–1.19)
PROTHROMBIN TIME: 15 SECONDS (ref 11.6–14.5)

## 2021-03-22 PROCEDURE — 3008F BODY MASS INDEX DOCD: CPT | Performed by: ORTHOPAEDIC SURGERY

## 2021-03-22 PROCEDURE — 3080F DIAST BP >= 90 MM HG: CPT | Performed by: ORTHOPAEDIC SURGERY

## 2021-03-22 PROCEDURE — 1036F TOBACCO NON-USER: CPT | Performed by: ORTHOPAEDIC SURGERY

## 2021-03-22 PROCEDURE — 3077F SYST BP >= 140 MM HG: CPT | Performed by: ORTHOPAEDIC SURGERY

## 2021-03-22 PROCEDURE — 85610 PROTHROMBIN TIME: CPT

## 2021-03-22 PROCEDURE — 36415 COLL VENOUS BLD VENIPUNCTURE: CPT

## 2021-03-22 PROCEDURE — 99213 OFFICE O/P EST LOW 20 MIN: CPT | Performed by: ORTHOPAEDIC SURGERY

## 2021-03-22 NOTE — PROGRESS NOTES
Assessment:   Diagnosis ICD-10-CM Associated Orders   1  DDD (degenerative disc disease), cervical  M50 30 Protime-INR   2  Cervical radiculopathy  M54 12        Plan:    Reviewed MRI with patient again multilevel ddd most pronounced at C5/6 with cord effacement but no cord signal abnormality  Discussed with patient his blood work needs to be under control for injection, Protime-INR script placed today, once we get it back will call patient with results and see if he can go for injection or needs to coordinate care with PCP about eliquis so he can move forward with injection  Reviewed could do fusion at C5/6 but this may not alleviate all his symptoms sine he has multilevel ddd, that is why injections recommended first  He maintains 5/5 strength, neurologically stable  Will get patients lab work and call with next step  He understands  Will refer to new pain management specialist as well in future  To do next visit:  Return for will call patient   The above stated was discussed in layman's terms and the patient expressed understanding  All questions were answered to the patient's satisfaction  Scribe Attestation    I,:  Mauri Catalan am acting as a scribe while in the presence of the attending physician :       I,:  Veknat Klein MD personally performed the services described in this documentation    as scribed in my presence :             Subjective:   Pedro Palafox is a 61 y o  male who presents for follow up is cervical spine  Treating for multilevel ddd most pronounced at C5/6 with cord effacement but no cord signal abnormality  Patient was sent to Dr Evi Rendon for cervical injection but unable to perform due to INR being to high despite stopping eliquis 3 days prior  On eliquis due to stroke management by Dr Adia Bartholomew  He continues with daily neck pain, no longer has sharp electric jolt in neck and down right arm but does continue to note intermittent numbness in right arm   He hasn't been able to work due to pain which has helped his symptoms  He maintains his strength  He has done 3 months of physical therapy, lyrica, OTC Nsaids  He just wants to get his life back and be active again  Review of systems negative unless otherwise specified in HPI  Review of Systems   Constitutional: Negative for chills and fever  HENT: Negative for ear pain and sore throat  Eyes: Negative for pain and visual disturbance  Respiratory: Negative for cough and shortness of breath  Cardiovascular: Negative for chest pain and palpitations  Gastrointestinal: Negative for abdominal pain and vomiting  Genitourinary: Negative for dysuria and hematuria  Musculoskeletal: Negative for arthralgias and back pain  Skin: Negative for color change and rash  Neurological: Negative for seizures and syncope  All other systems reviewed and are negative  Past Medical History:   Diagnosis Date    A-fib [facility])     Colon polyp     Diabetes mellitus ([facility] [address] )     GERD (gastroesophageal reflux disease)     Hyperlipidemia     Hypertension     Liver disease     Hx Hep C treated    Neck pain     Osteoarthritis     TIA (transient ischemic attack)        Past Surgical History:   Procedure Laterality Date    ROLAN HOLE FOR SUBDURAL HEMATOMA      COLONOSCOPY N/A 2016    Procedure: COLONOSCOPY;  Surgeon: Andreas Collins MD;  Location: [facility];   Service:     INGUINAL HERNIA REPAIR      ORTHOPEDIC SURGERY      SHOULDER SURGERY Left        Family History   Problem Relation Age of Onset    Atrial fibrillation Mother     Hypertension Mother     No Known Problems Father        Social History     Occupational History    Occupation: EMPLOYED   Tobacco Use    Smoking status: Former Smoker     Packs/day: 0 20     Quit date: 2018     Years since quittin 6    Smokeless tobacco: Former User   Substance and Sexual Activity    Alcohol use: No     Frequency: Never    Drug use: No     Comment: [employer] SAYS ACTIVE    Sexual activity: Yes     Comment: [employer]         Current Outpatient Medications:     apixaban (Eliquis) 5 mg, Take 1 tablet (5 mg total) by mouth 2 (two) times a day, Disp: 180 tablet, Rfl: 2    atorvastatin (LIPITOR) 40 mg tablet, Take 1 tablet (40 mg total) by mouth daily, Disp: 90 tablet, Rfl: 3    clotrimazole (LOTRIMIN) 1 % cream, Apply topically 2 (two) times a day Continue for 2-4 weeks  , Disp: 60 g, Rfl: 0    digoxin (LANOXIN) 0 25 mg tablet, TAKE 1 TABLET DAILY, Disp: 90 tablet, Rfl: 3    econazole nitrate 1 % cream, Apply topically 2 (two) times a day, Disp: 30 g, Rfl: 0    glucose blood (ONE TOUCH ULTRA TEST) test strip, 3 (three) times a day, Disp: , Rfl:     Lancets (ONETOUCH ULTRASOFT) lancets, 3 (three) times a day, Disp: , Rfl:     lisinopril (ZESTRIL) 2 5 mg tablet, TAKE 1 TABLET DAILY, Disp: 90 tablet, Rfl: 3    metFORMIN (GLUCOPHAGE) 500 mg tablet, TAKE 1 TABLET TWICE A DAY, Disp: 180 tablet, Rfl: 3    metoprolol tartrate (LOPRESSOR) 25 mg tablet, Take 0 5 tablets (12 5 mg total) by mouth every 12 (twelve) hours, Disp: 90 tablet, Rfl: 3    Milk Thistle 250 MG CAPS, Take by mouth, Disp: , Rfl:     omeprazole (PriLOSEC) 20 mg delayed release capsule, TAKE 1 CAPSULE DAILY, Disp: 90 capsule, Rfl: 3    sildenafil (VIAGRA) 50 MG tablet, Take 1 tablet (50 mg total) by mouth daily as needed for erectile dysfunction, Disp: 10 tablet, Rfl: 0    tadalafil (CIALIS) 10 MG tablet, Take 1 on 2 occasions, and then can increase to 2 on 2 attempts if not helpful , Disp: 10 tablet, Rfl: 0    pregabalin (LYRICA) 150 mg capsule, Take 1 capsule (150 mg total) by mouth 2 (two) times a day, Disp: 60 capsule, Rfl: 1    No Known Allergies         Vitals:    03/22/21 1219   BP: (!) 165/108   Pulse: 105       Objective:                    Back Exam     Comments:  Cervical spine  No acute distress, skin intact  No specific tenderness to palpation cervical spine   C2-T1 5/5 strength bilateral   C2-T1 sensation intact and symmetric             Diagnostics, reviewed and taken today if performed as documented:    None performed          Procedures, if performed today:    Procedures    None performed      Portions of the record may have been created with voice recognition software  Occasional wrong word or "sound a like" substitutions may have occurred due to the inherent limitations of voice recognition software  Read the chart carefully and recognize, using context, where substitutions have occurred

## 2021-03-24 ENCOUNTER — TELEPHONE (OUTPATIENT)
Dept: OBGYN CLINIC | Facility: HOSPITAL | Age: 56
End: 2021-03-24

## 2021-03-24 NOTE — TELEPHONE ENCOUNTER
Please advise if this patient who was seen by Darrel Joshi 3/10 can be given an appt for injections now that his INR is 1 18/PT 15 0  He is currently on Eliquis 5mg BID  He wants to pursue injections  Please call patient back to schedule  Thanks

## 2021-03-24 NOTE — TELEPHONE ENCOUNTER
Labwork is good enough for injections with pain management to do injections: INR is within normal limits now    GS

## 2021-03-24 NOTE — TELEPHONE ENCOUNTER
Dr Gigi Driscoll    Patient is asking for his labwork to be reviewed so he can be given new pain medicine        # [phone]

## 2021-03-25 ENCOUNTER — TELEPHONE (OUTPATIENT)
Dept: PAIN MEDICINE | Facility: MEDICAL CENTER | Age: 56
End: 2021-03-25

## 2021-03-25 NOTE — TELEPHONE ENCOUNTER
Patient is calling back and is ready to schedule his injections per all the notes below, I transferred hid call to [place] [facility] to have his appointment scheduled

## 2021-03-25 NOTE — TELEPHONE ENCOUNTER
Duplicate  There is already a task addressing this and it is out to [place], clinical nursing  Will close; do not add to this task

## 2021-03-25 NOTE — TELEPHONE ENCOUNTER
Kate Shukla that's good news, if he'd like to set up the MAG now we can do that  He will need to hold his Eliquis  Was that approved?

## 2021-03-25 NOTE — TELEPHONE ENCOUNTER
Per separate task started by call center:     Garima Joel         3/25/21 10:04 AM  Note     Patient called in to schedule an injection and see what his next steps are in treatment   Thank you           [phone]

## 2021-03-25 NOTE — TELEPHONE ENCOUNTER
Patient called in to schedule an injection and see what his next steps are in treatment   Thank you         [phone]

## 2021-03-26 NOTE — TELEPHONE ENCOUNTER
S/w pt, scheduled his procedure for 3/31 at 8 am  RN instructed pt to hold Eliquis 3/28-3/31   Pt said he is getting his first COVID vaccine on Tuesday 3/30, but pt would rather cx the vaccine and get the injection instead, ok with JE?    **RN did not continue to review pre-procedure instructions until ok to move forward

## 2021-03-26 NOTE — TELEPHONE ENCOUNTER
S/w pt, reviewed that he should wait 2 weeks after his injection to reschedule his COVID vaccine  Reviewed pre-procedure instructions: pt will need a , NPO one hour prior, wear loose fitting clothing and call back if they get sick or started on any abx

## 2021-03-29 NOTE — TELEPHONE ENCOUNTER
[place] from [facility]) is calling to let us know that we are reaching out to the wrong provider    We are trying to reach [facility], NOT [facility]

## 2021-03-31 ENCOUNTER — HOSPITAL ENCOUNTER (OUTPATIENT)
Dept: RADIOLOGY | Facility: MEDICAL CENTER | Age: 56
Discharge: HOME | End: 2021-03-31
Attending: PHYSICAL MEDICINE & REHABILITATION | Admitting: PHYSICAL MEDICINE & REHABILITATION
Payer: COMMERCIAL

## 2021-03-31 VITALS
HEART RATE: 88 BPM | DIASTOLIC BLOOD PRESSURE: 82 MMHG | SYSTOLIC BLOOD PRESSURE: 167 MMHG | TEMPERATURE: 98.4 F | OXYGEN SATURATION: 98 % | RESPIRATION RATE: 18 BRPM

## 2021-03-31 DIAGNOSIS — M54.12 CERVICAL RADICULOPATHY: ICD-10-CM

## 2021-03-31 PROCEDURE — 62321 NJX INTERLAMINAR CRV/THRC: CPT | Performed by: PHYSICAL MEDICINE & REHABILITATION

## 2021-03-31 RX ORDER — METHYLPREDNISOLONE ACETATE 80 MG/ML
80 INJECTION, SUSPENSION INTRA-ARTICULAR; INTRALESIONAL; INTRAMUSCULAR; PARENTERAL; SOFT TISSUE ONCE
Status: COMPLETED | OUTPATIENT
Start: 2021-03-31 | End: 2021-03-31

## 2021-03-31 RX ADMIN — METHYLPREDNISOLONE ACETATE 80 MG: 80 INJECTION, SUSPENSION INTRA-ARTICULAR; INTRALESIONAL; INTRAMUSCULAR; PARENTERAL; SOFT TISSUE at 08:24

## 2021-03-31 RX ADMIN — IOHEXOL 1 ML: 300 INJECTION, SOLUTION INTRAVENOUS at 08:23

## 2021-03-31 NOTE — DISCHARGE INSTRUCTIONS
Epidural Steroid Injection   WHAT YOU NEED TO KNOW:   An epidural steroid injection (ABDIRAHMAN) is a procedure to inject steroid medicine into the epidural space  The epidural space is between your spinal cord and vertebrae  Steroids reduce inflammation and fluid buildup in your spine that may be causing pain  You may be given pain medicine along with the steroids  ACTIVITY  · Do not drive or operate machinery today  · No strenuous activity today - bending, lifting, etc   · You may resume normal activites starting tomorrow - start slowly and as tolerated  · You may shower today, but no tub baths or hot tubs  · You may have numbness for several hours from the local anesthetic  Please use caution and common sense, especially with weight-bearing activities  CARE OF THE INJECTION SITE  · If you have soreness or pain, apply ice to the area today (20 minutes on/20 minutes off)  · Starting tomorrow, you may use warm, moist heat or ice if needed  · You may have an increase or change in your discomfort for 36-48 hours after your treatment  · Apply ice and continue with any pain medication you have been prescribed  · Notify the [facility] if you have any of the following: redness, drainage, swelling, headache, stiff neck or fever above 100°F     SPECIAL INSTRUCTIONS  · Our office will contact you in approximately 7 days for a progress report  MEDICATIONS  · Continue to take all routine medications  · Our office may have instructed you to hold some medications  Resume Eliquis tomorrow 4/1/21 at 8:45 am     As no general anesthesia was used in today's procedure, you should not experience any side effects related to anesthesia  If you have a problem specifically related to your procedure, please call our office at [phone]  Problems not related to your procedure should be directed to your primary care physician

## 2021-04-02 ENCOUNTER — OFFICE VISIT (OUTPATIENT)
Dept: FAMILY MEDICINE CLINIC | Facility: CLINIC | Age: 56
End: 2021-04-02
Payer: COMMERCIAL

## 2021-04-02 VITALS
HEIGHT: 71 IN | WEIGHT: 218 LBS | SYSTOLIC BLOOD PRESSURE: 122 MMHG | DIASTOLIC BLOOD PRESSURE: 74 MMHG | BODY MASS INDEX: 30.52 KG/M2 | HEART RATE: 60 BPM

## 2021-04-02 DIAGNOSIS — M50.30 DDD (DEGENERATIVE DISC DISEASE), CERVICAL: Primary | ICD-10-CM

## 2021-04-02 DIAGNOSIS — M54.12 CERVICAL RADICULOPATHY: ICD-10-CM

## 2021-04-02 DIAGNOSIS — I48.21 PERMANENT ATRIAL FIBRILLATION (HCC): ICD-10-CM

## 2021-04-02 DIAGNOSIS — E78.2 MIXED HYPERLIPIDEMIA: ICD-10-CM

## 2021-04-02 DIAGNOSIS — E11.22 TYPE 2 DIABETES MELLITUS WITH CHRONIC KIDNEY DISEASE, WITHOUT LONG-TERM CURRENT USE OF INSULIN, UNSPECIFIED CKD STAGE (HCC): ICD-10-CM

## 2021-04-02 DIAGNOSIS — I10 ESSENTIAL HYPERTENSION: ICD-10-CM

## 2021-04-02 PROCEDURE — 99214 OFFICE O/P EST MOD 30 MIN: CPT | Performed by: FAMILY MEDICINE

## 2021-04-02 PROCEDURE — 3066F NEPHROPATHY DOC TX: CPT | Performed by: PHYSICAL MEDICINE & REHABILITATION

## 2021-04-02 NOTE — PROGRESS NOTES
Assessment and Plan:    Problem List Items Addressed This Visit     Atrial fibrillation [facility])     Patient currently on Eliquis for atrial fibrillation and CVA prophylaxis  This is in place of warfarin due to the ability to stop Eliquis and have the injection  Otherwise, stable  Exam today was normal          DDD (degenerative disc disease), cervical - Primary     Patient still has weakness in the upper extremity on the right, as well as the left  This is likely on the basis of the condition described by Orthopedics at last visit there  Patient with multilevel disc disease in the cervical spine  Patient to follow-up with orthopedics, as well as pain management  For the time being, I would not release him back to work  This should be released from Orthopedics or pain management  Hyperlipidemia     Stable on atorvastatin  No change  Hypertension     Blood pressure is much better today than what it was for the last 2 chart notes  Those were when he was having discomfort, i e  Pain management for injection and Orthopedics for evaluation  After the injection from Pain Management, his blood pressure is now down  It is at goal, no changes are needed  Type 2 diabetes mellitus (HCC)       Lab Results   Component Value Date    HGBA1C 6 9 (H) 02/11/2021   Patient is only on metformin  Fortunately, that is sufficient  A1c was 6 9 in February  Will need to be checked in May  Diagnoses and all orders for this visit:    DDD (degenerative disc disease), cervical    Essential hypertension    Mixed hyperlipidemia    Type 2 diabetes mellitus with chronic kidney disease, without long-term current use of insulin, unspecified CKD stage (HCC)    Permanent atrial fibrillation (HCC)              Subjective:      Patient ID: Andrea Estes is a 61 y o  male      CC:    Chief Complaint   Patient presents with    Disability Paperwork     Pt needs to have paperwork completed for extended Disability  kw       HPI:    Patient did have epidural steroid injections done on the 31st   He reports that the pain has decreased since then, but does not notice if there is any changes in strength  Patient is reporting that he still sometimes has pain  It depends on how much he moves or lifts  Patient is not sure exactly how much weight he could lift without causing discomfort at this time  CVA:  Patient is currently on Eliquis  This is because he can stop the Eliquis for the epidural injections  He was previously on warfarin  Unfortunately, the INR was not coming down and he was not able to get the injections initially  Patient has been through a significant amount of physical therapy for his neck and not really had significant benefit  This was fully detailed in the note from Orthopedics prior to the epidural steroid injection  Hypertension:  Patient had significant elevation in blood pressure when he was at the orthopedic appointment, as well as elevation in systolic when he was having the epidural injection  Today, blood pressure was reviewed  He is having less pain today, and his blood pressure is better  Currently on lisinopril, metoprolol  Hyperlipidemia:  Currently on atorvastatin 40 mg  No particular concerns so far  Diabetes:  Currently on metformin  Using 1 b i d         The following portions of the patient's history were reviewed and updated as appropriate: allergies, current medications, past family history, past medical history, past social history, past surgical history and problem list       Review of Systems   Constitutional: Negative  HENT: Negative  Eyes: Negative  Respiratory: Negative  Cardiovascular: Negative  Gastrointestinal: Negative  Endocrine: Negative  Genitourinary: Negative  Musculoskeletal: Negative  Skin: Negative  Allergic/Immunologic: Negative  Neurological: Negative  Hematological: Negative  Psychiatric/Behavioral: Negative  Data to review:       Objective:    Vitals:    04/02/21 0834   BP: 122/74   BP Location: Left arm   Patient Position: Sitting   Pulse: 60   Weight: 98 9 kg (218 lb)   Height: 5' 11" (1 803 m)        Physical Exam  Vitals signs and nursing note reviewed  Constitutional:       Appearance: Normal appearance  Neck:      Vascular: No carotid bruit  Cardiovascular:      Rate and Rhythm: Normal rate and regular rhythm  Pulses: Normal pulses  Carotid pulses are 2+ on the right side and 2+ on the left side  Heart sounds: Normal heart sounds  No murmur  No gallop  Pulmonary:      Effort: Pulmonary effort is normal  No respiratory distress  Breath sounds: Normal breath sounds  No stridor  No wheezing, rhonchi or rales  Chest:      Chest wall: No tenderness  Musculoskeletal:      Right shoulder: He exhibits decreased strength (Decreased strength with extension, abduction  Normal with flexion and adduction)  He exhibits normal range of motion and no tenderness  Left shoulder: He exhibits normal range of motion, no tenderness and normal strength  Right hand: Normal strength noted  Left hand: Decreased strength ( strength decreased) noted  Neurological:      Mental Status: He is alert

## 2021-04-02 NOTE — ASSESSMENT & PLAN NOTE
Patient currently on Eliquis for atrial fibrillation and CVA prophylaxis  This is in place of warfarin due to the ability to stop Eliquis and have the injection  Otherwise, stable    Exam today was normal

## 2021-04-02 NOTE — ASSESSMENT & PLAN NOTE
Blood pressure is much better today than what it was for the last 2 chart notes  Those were when he was having discomfort, i e  Pain management for injection and Orthopedics for evaluation  After the injection from Pain Management, his blood pressure is now down  It is at goal, no changes are needed

## 2021-04-02 NOTE — PATIENT INSTRUCTIONS
Problem List Items Addressed This Visit     Atrial fibrillation [facility])     Patient currently on Eliquis for atrial fibrillation and CVA prophylaxis  This is in place of warfarin due to the ability to stop Eliquis and have the injection  Otherwise, stable  Exam today was normal          DDD (degenerative disc disease), cervical - Primary     Patient still has weakness in the upper extremity on the right, as well as the left  This is likely on the basis of the condition described by Orthopedics at last visit there  Patient with multilevel disc disease in the cervical spine  Patient to follow-up with orthopedics, as well as pain management  For the time being, I would not release him back to work  This should be released from Orthopedics or pain management  Hyperlipidemia     Stable on atorvastatin  No change  Hypertension     Blood pressure is much better today than what it was for the last 2 chart notes  Those were when he was having discomfort, i e  Pain management for injection and Orthopedics for evaluation  After the injection from Pain Management, his blood pressure is now down  It is at goal, no changes are needed  Type 2 diabetes mellitus (HCC)       Lab Results   Component Value Date    HGBA1C 6 9 (H) 02/11/2021   Patient is only on metformin  Fortunately, that is sufficient  A1c was 6 9 in February  Will need to be checked in May  COVID 19 Instructions    Desire Jay was advised to limit contact with others to essential tasks such as getting food, medications, and medical care  Proper handwashing reviewed, and Hand sanitzer when washing is not available  If the patient develops symptoms of COVID 19, the patient should call the office as soon as possible  For [id number] Flu season, it is strongly recommended that Flu Vaccinations be obtained  Please try to download [employer] Duo    Once you do download this on your phone, you will be prompted to add your phone number to the account  After that, he should receive a text from [employer], and use that code to verify your phone number  After that, you should be able to use Google Duo to receive and make video calls  Please download Microsoft Teams to your phone or computer  We will be transitioning to this platform for Video Visits  Instructions for downloading this are available from the office  We are committed to getting you vaccinated as soon as possible and will be closely following CDC and [facility]  guidelines as they are released and revised  Please refer to our COVID-19 vaccine webpage for the most up to date information on the vaccine and our distribution efforts      Glory phillip

## 2021-04-02 NOTE — ASSESSMENT & PLAN NOTE
Patient still has weakness in the upper extremity on the right, as well as the left  This is likely on the basis of the condition described by Orthopedics at last visit there  Patient with multilevel disc disease in the cervical spine  Patient to follow-up with orthopedics, as well as pain management  For the time being, I would not release him back to work  This should be released from Orthopedics or pain management

## 2021-04-02 NOTE — ASSESSMENT & PLAN NOTE
Lab Results   Component Value Date    HGBA1C 6 9 (H) 02/11/2021   Patient is only on metformin  Fortunately, that is sufficient  A1c was 6 9 in February  Will need to be checked in May

## 2021-04-07 ENCOUNTER — TELEPHONE (OUTPATIENT)
Dept: PAIN MEDICINE | Facility: CLINIC | Age: 56
End: 2021-04-07

## 2021-04-08 NOTE — TELEPHONE ENCOUNTER
--[place]--    RN s/w pt regarding previous  RN expressed happiness over pt starting to feel relief and advised that he give the injection another week to see how well he feels  RN did say that [facility] does at times repeat injections after the first injection and then they are on a 3 month in between basis going forward depending on symptoms  RN also added that the pt has blood thinner holds that were of great concern to his cardiologist and each time blood thinners are held there is a risk  RN asked pt to f/u with our office in  another week to let us know of his progress  Pt stated that now that his neck is feeling better he noticed his back pain  RN advised that many times once we start to decrease the pain that patients first come in with they then start to notice other body aches and pains  Pt appreciative of same

## 2021-04-08 NOTE — TELEPHONE ENCOUNTER
Pt feels a lot better, but he stills feels not right  His pain level is a 6/10, the injection helped about 60%  Will he be able to get another injection? Also how soon after the first injection, can he get another one?  Please follow up thank you

## 2021-04-09 DIAGNOSIS — I48.21 PERMANENT ATRIAL FIBRILLATION (HCC): ICD-10-CM

## 2021-04-09 RX ORDER — DIGOXIN 250 MCG
TABLET ORAL
Qty: 90 TABLET | Refills: 0 | Status: SHIPPED | OUTPATIENT
Start: 2021-04-09 | End: 2021-07-02

## 2021-04-14 ENCOUNTER — TELEPHONE (OUTPATIENT)
Dept: FAMILY MEDICINE CLINIC | Facility: CLINIC | Age: 56
End: 2021-04-14

## 2021-04-16 NOTE — TELEPHONE ENCOUNTER
Aware and agree with plan  Ok to repeat but he will need to hold meds as previous with same risks as before

## 2021-04-16 NOTE — TELEPHONE ENCOUNTER
S/W pt  Pt requested repeat injection of right MAG  Pt stated he is better but still feels it  Current pain level: 6/10    Percentage relief from previous injection: 60%    Duration of pain relief from previous injection:  He feels better but he is going back to work and he thinks the pain will increase    Pt is going back to work on the 28 th and he is asking if he can get another injection before that? Pt stated he has been out of work for 6 months  His pain level before the injection was 8/10  Please advise

## 2021-04-26 ENCOUNTER — OFFICE VISIT (OUTPATIENT)
Dept: FAMILY MEDICINE CLINIC | Facility: CLINIC | Age: 56
End: 2021-04-26
Payer: COMMERCIAL

## 2021-04-26 ENCOUNTER — OFFICE VISIT (OUTPATIENT)
Dept: PAIN MEDICINE | Facility: MEDICAL CENTER | Age: 56
End: 2021-04-26
Payer: COMMERCIAL

## 2021-04-26 ENCOUNTER — APPOINTMENT (OUTPATIENT)
Dept: LAB | Facility: MEDICAL CENTER | Age: 56
End: 2021-04-26
Payer: COMMERCIAL

## 2021-04-26 VITALS
HEIGHT: 71 IN | BODY MASS INDEX: 29.96 KG/M2 | WEIGHT: 214 LBS | SYSTOLIC BLOOD PRESSURE: 177 MMHG | HEART RATE: 98 BPM | DIASTOLIC BLOOD PRESSURE: 75 MMHG | TEMPERATURE: 97.5 F

## 2021-04-26 VITALS
DIASTOLIC BLOOD PRESSURE: 78 MMHG | WEIGHT: 217 LBS | HEART RATE: 68 BPM | HEIGHT: 71 IN | BODY MASS INDEX: 30.38 KG/M2 | SYSTOLIC BLOOD PRESSURE: 148 MMHG | RESPIRATION RATE: 16 BRPM

## 2021-04-26 DIAGNOSIS — M54.12 CERVICAL RADICULOPATHY: Primary | ICD-10-CM

## 2021-04-26 DIAGNOSIS — N52.1 ERECTILE DYSFUNCTION DUE TO DISEASES CLASSIFIED ELSEWHERE: ICD-10-CM

## 2021-04-26 DIAGNOSIS — I10 ESSENTIAL HYPERTENSION: ICD-10-CM

## 2021-04-26 DIAGNOSIS — M50.90 CERVICAL DISC DISEASE: ICD-10-CM

## 2021-04-26 DIAGNOSIS — R80.9 MICROALBUMINURIA: ICD-10-CM

## 2021-04-26 DIAGNOSIS — I63.519 ACUTE ISCHEMIC CEREBROVASCULAR ACCIDENT (CVA) INVOLVING MIDDLE CEREBRAL ARTERY TERRITORY (HCC): ICD-10-CM

## 2021-04-26 DIAGNOSIS — E11.9 TYPE 2 DIABETES MELLITUS WITHOUT COMPLICATION, UNSPECIFIED WHETHER LONG TERM INSULIN USE (HCC): ICD-10-CM

## 2021-04-26 DIAGNOSIS — R80.9 CONTROLLED TYPE 2 DIABETES MELLITUS WITH MICROALBUMINURIA, WITHOUT LONG-TERM CURRENT USE OF INSULIN (HCC): ICD-10-CM

## 2021-04-26 DIAGNOSIS — E11.29 CONTROLLED TYPE 2 DIABETES MELLITUS WITH MICROALBUMINURIA, WITHOUT LONG-TERM CURRENT USE OF INSULIN (HCC): ICD-10-CM

## 2021-04-26 DIAGNOSIS — B18.2 CHRONIC HEPATITIS C WITHOUT HEPATIC COMA (HCC): ICD-10-CM

## 2021-04-26 DIAGNOSIS — I48.21 PERMANENT ATRIAL FIBRILLATION (HCC): ICD-10-CM

## 2021-04-26 DIAGNOSIS — M50.120 CERVICAL DISC DISORDER WITH RADICULOPATHY OF MID-CERVICAL REGION: Primary | ICD-10-CM

## 2021-04-26 LAB
INR PPP: 1.22 (ref 0.84–1.19)
PLATELET # BLD AUTO: 119 THOUSANDS/UL (ref 149–390)
PMV BLD AUTO: 10 FL (ref 8.9–12.7)
PROTHROMBIN TIME: 15.4 SECONDS (ref 11.6–14.5)

## 2021-04-26 PROCEDURE — 36415 COLL VENOUS BLD VENIPUNCTURE: CPT

## 2021-04-26 PROCEDURE — 3078F DIAST BP <80 MM HG: CPT | Performed by: PHYSICAL MEDICINE & REHABILITATION

## 2021-04-26 PROCEDURE — 3077F SYST BP >= 140 MM HG: CPT | Performed by: PHYSICAL MEDICINE & REHABILITATION

## 2021-04-26 PROCEDURE — 99214 OFFICE O/P EST MOD 30 MIN: CPT | Performed by: PHYSICAL MEDICINE & REHABILITATION

## 2021-04-26 PROCEDURE — 4010F ACE/ARB THERAPY RXD/TAKEN: CPT | Performed by: PHYSICAL MEDICINE & REHABILITATION

## 2021-04-26 PROCEDURE — 85610 PROTHROMBIN TIME: CPT

## 2021-04-26 PROCEDURE — 3008F BODY MASS INDEX DOCD: CPT | Performed by: FAMILY MEDICINE

## 2021-04-26 PROCEDURE — 85049 AUTOMATED PLATELET COUNT: CPT

## 2021-04-26 PROCEDURE — 1036F TOBACCO NON-USER: CPT | Performed by: PHYSICAL MEDICINE & REHABILITATION

## 2021-04-26 PROCEDURE — 99214 OFFICE O/P EST MOD 30 MIN: CPT | Performed by: FAMILY MEDICINE

## 2021-04-26 RX ORDER — LISINOPRIL 5 MG/1
5 TABLET ORAL DAILY
Qty: 90 TABLET | Refills: 1 | Status: SHIPPED | OUTPATIENT
Start: 2021-04-26 | End: 2021-07-22 | Stop reason: SDUPTHER

## 2021-04-26 RX ORDER — SILDENAFIL 50 MG/1
50 TABLET, FILM COATED ORAL DAILY PRN
Qty: 10 TABLET | Refills: 0 | Status: SHIPPED | OUTPATIENT
Start: 2021-04-26 | End: 2022-01-17 | Stop reason: SDUPTHER

## 2021-04-26 NOTE — TELEPHONE ENCOUNTER
Rec'd Eliquis hold approval from Dr Gema Estrella  Signed 4/21/2021 (exp 6/20/2021)    Will have scanned into chart; I have copy      right MAG  4w f/u with NP     (patient has appt today with Dr Wilbert Thomas)

## 2021-04-26 NOTE — ASSESSMENT & PLAN NOTE
Patient does have significant issues with cervical disc disease that continue to be a problem  He is going to have another epidural steroid  If that does not improve things, patient can go to neuro surgery  He has previously seen orthopedic spine surgery, so will see them

## 2021-04-26 NOTE — ASSESSMENT & PLAN NOTE
Per discussion of cervical disc  Patient continues to have pain  Epidural steroid, and then orthopedic spine surgeon

## 2021-04-26 NOTE — PROGRESS NOTES
Assessment  1  Cervical disc disorder with radiculopathy of mid-cervical region    2  Chronic hepatitis C without hepatic coma ([facility] [address] )        Plan  1  At this time the patient is interested in repeating the epidural steroid injection  He did get about 80-90% relief for about 2-3 weeks with the 1st injection and would like to have this repeated  He understands we will need to run his lab work again to ensure his PT/INR, and platelet function are within reasonable levels  2  If this fails to provide durable benefit would recommend returning to Dr Katiana Desai to discuss surgical management  My impressions and treatment recommendations were discussed in detail with the patient who verbalized understanding and had no further questions  Discharge instructions were provided  I personally saw and examined the patient and I agree with the above discussed plan of care  Orders Placed This Encounter   Procedures    [place] spine and pain procedure     [place], Dr Maia Echols, we held this previously - needs PT/INR and platelets checked as well     Standing Status:   Future     Standing Expiration Date:   4/26/2022     Order Specific Question:   Reason for Exam:     Answer:   (R) MAG     Order Specific Question:   Anticoagulant hold needed? Answer:   yes    Protime-INR     Standing Status:   Future     Standing Expiration Date:   4/26/2022    Platelet count     Standing Status:   Future     Standing Expiration Date:   4/26/2022     No orders of the defined types were placed in this encounter  History of Present Illness    James Altamirano is a 61 y o  male returns in follow-up after completion of cervical epidural steroid injection  He does describe some improvement in his symptoms currently with pain rated as a 7/10  He described about 80-90% relief of symptoms for 2-3 weeks after the injection and the pain has started to return  He is interested in repeating it at this point given the improvement that he had    He describes this as dull, aching, pins and needle sensation  Medications providing about 60% relief of symptoms but had better relief with the injection  He is noticing some moderate functional deficits as result of the pain and has been unable to work because of this  I have personally reviewed and/or updated the patient's past medical history, past surgical history, family history, social history, current medications, allergies, and vital signs today  Review of Systems   Respiratory: Negative for shortness of breath  Cardiovascular: Negative for chest pain  Gastrointestinal: Negative for constipation, diarrhea, nausea and vomiting  Musculoskeletal: Positive for back pain, gait problem, joint swelling (ankles) and neck pain  Negative for arthralgias and myalgias  Skin: Negative for rash  Neurological: Negative for dizziness, seizures and weakness  All other systems reviewed and are negative        Patient Active Problem List   Diagnosis    Allergic rhinitis    Atrial fibrillation (HCC)    Chronic hepatitis C virus infection ([place] [address] )    Colon polyp    Type 2 diabetes mellitus ([place] [address] )    Erectile dysfunction    Esophageal reflux    Microalbuminuria    Nocturia    Palpitations    RBBB    Thrombocytopenia (HCC)    Tobacco abuse    Hand dermatitis    Vasculitis of skin    Chronic pain of right ankle    Cerebrovascular accident (CVA) due to embolism of precerebral artery (HCC)    Obesity    Cirrhosis of liver (HCC)    Hypertension    Muscle ache    Acute pain of right shoulder    Tinea cruris    Cervical disc disease    Hyperlipidemia    DDD (degenerative disc disease), cervical    Cervical radiculopathy    Onychomycosis       Past Medical History:   Diagnosis Date    A-fib ([place] [address] )     Colon polyp     Diabetes mellitus (HCC)     GERD (gastroesophageal reflux disease)     Hyperlipidemia     Hypertension     Liver disease     Hx Hep C treated    Neck pain     Osteoarthritis     TIA (transient ischemic attack)        Past Surgical History:   Procedure Laterality Date    ROLAN HOLE FOR SUBDURAL HEMATOMA      COLONOSCOPY N/A 2016    Procedure: COLONOSCOPY;  Surgeon: Rachel Catalan MD;  Location: [facility]; Service:     INGUINAL HERNIA REPAIR      ORTHOPEDIC SURGERY      SHOULDER SURGERY Left        Family History   Problem Relation Age of Onset    Atrial fibrillation Mother     Hypertension Mother     No Known Problems Father        Social History     Occupational History    Occupation: EMPLOYED   Tobacco Use    Smoking status: Former Smoker     Packs/day: 0 20     Quit date: 2018     Years since quittin 7    Smokeless tobacco: Former User   Substance and Sexual Activity    Alcohol use: No     Frequency: Never    Drug use: No     Comment: [employer] SAYS ACTIVE    Sexual activity: Yes     Comment: [address] RELATIONSHIP       Current Outpatient Medications on File Prior to Visit   Medication Sig    apixaban (Eliquis) 5 mg Take 1 tablet (5 mg total) by mouth 2 (two) times a day    atorvastatin (LIPITOR) 40 mg tablet Take 1 tablet (40 mg total) by mouth daily    clotrimazole (LOTRIMIN) 1 % cream Apply topically 2 (two) times a day Continue for 2-4 weeks      digoxin (LANOXIN) 0 25 mg tablet TAKE 1 TABLET BY MOUTH EVERY DAY    econazole nitrate 1 % cream Apply topically 2 (two) times a day    glucose blood (ONE TOUCH ULTRA TEST) test strip 3 (three) times a day    Lancets (ONETOUCH ULTRASOFT) lancets 3 (three) times a day    lisinopril (ZESTRIL) 2 5 mg tablet TAKE 1 TABLET DAILY    metFORMIN (GLUCOPHAGE) 500 mg tablet TAKE 1 TABLET TWICE A DAY    metoprolol tartrate (LOPRESSOR) 25 mg tablet Take 0 5 tablets (12 5 mg total) by mouth every 12 (twelve) hours    Milk Thistle 250 MG CAPS Take by mouth    omeprazole (PriLOSEC) 20 mg delayed release capsule TAKE 1 CAPSULE DAILY    sildenafil (VIAGRA) 50 MG tablet Take 1 tablet (50 mg total) by mouth daily as needed for erectile dysfunction    tadalafil (CIALIS) 10 MG tablet Take 1 on 2 occasions, and then can increase to 2 on 2 attempts if not helpful   pregabalin (LYRICA) 150 mg capsule Take 1 capsule (150 mg total) by mouth 2 (two) times a day    [DISCONTINUED] metoprolol tartrate (LOPRESSOR) 25 mg tablet TAKE 1/2 TABLETS (12 5 MG TOTAL) BY MOUTH EVERY 12 (TWELVE) HOURS     No current facility-administered medications on file prior to visit  No Known Allergies    Physical Exam    BP (!) 177/75   Pulse 98   Temp 97 5 °F (36 4 °C)   Ht 5' 11" (1 803 m)   Wt 97 1 kg (214 lb)   BMI 29 85 kg/m²     Constitutional: normal, well developed, well nourished, alert, in mild to moderate distress and non-toxic and no overt pain behavior    Eyes: anicteric  HEENT: grossly intact  Neck: supple, symmetric, trachea midline and no masses   Pulmonary:even and unlabored  Cardiovascular:No edema or pitting edema present  Skin:Normal without rashes or lesions and well hydrated  Psychiatric:Mood and affect appropriate  Neurologic:Cranial Nerves II-XII grossly intact  Musculoskeletal:normal, except for neck and radiating right arm pain complaints as previous    Imaging

## 2021-04-26 NOTE — PROGRESS NOTES
Assessment and Plan:    Problem List Items Addressed This Visit     Atrial fibrillation ([address] )     Stable  Follow with Cardiology  No change  Continues on Eliquis  Relevant Medications    lisinopril (ZESTRIL) 5 mg tablet    sildenafil (VIAGRA) 50 MG tablet    Cervical disc disease     Patient does have significant issues with cervical disc disease that continue to be a problem  He is going to have another epidural steroid  If that does not improve things, patient can go to neuro surgery  He has previously seen orthopedic spine surgery, so will see them  Cervical radiculopathy - Primary     Per discussion of cervical disc  Patient continues to have pain  Epidural steroid, and then orthopedic spine surgeon  Erectile dysfunction     Patient does wish to have a prescription for Viagra  Understands risks  Sent to pharmacy  Relevant Medications    sildenafil (VIAGRA) 50 MG tablet    Hypertension     Blood pressure today is elevated  Would recommend increasing lisinopril from 2 5-5  Recheck at next visit in approximately 1 month  Relevant Medications    lisinopril (ZESTRIL) 5 mg tablet    Microalbuminuria    Relevant Medications    lisinopril (ZESTRIL) 5 mg tablet    Type 2 diabetes mellitus (HCC)    Relevant Medications    lisinopril (ZESTRIL) 5 mg tablet      Other Visit Diagnoses     Controlled type 2 diabetes mellitus with microalbuminuria, without long-term current use of insulin (HCC)        Relevant Medications    lisinopril (ZESTRIL) 5 mg tablet    Acute ischemic cerebrovascular accident (CVA) involving middle cerebral artery territory [facility])        Relevant Medications    lisinopril (ZESTRIL) 5 mg tablet                 Diagnoses and all orders for this visit:    Cervical radiculopathy    Cervical disc disease    Essential hypertension    Permanent atrial fibrillation (HCC)  -     lisinopril (ZESTRIL) 5 mg tablet;  Take 1 tablet (5 mg total) by mouth daily    Erectile dysfunction due to diseases classified elsewhere  -     sildenafil (VIAGRA) 50 MG tablet; Take 1 tablet (50 mg total) by mouth daily as needed for erectile dysfunction    Controlled type 2 diabetes mellitus with microalbuminuria, without long-term current use of insulin (HCC)  -     lisinopril (ZESTRIL) 5 mg tablet; Take 1 tablet (5 mg total) by mouth daily    Type 2 diabetes mellitus without complication, unspecified whether long term insulin use (HCC)  -     lisinopril (ZESTRIL) 5 mg tablet; Take 1 tablet (5 mg total) by mouth daily    Acute ischemic cerebrovascular accident (CVA) involving middle cerebral artery territory ([facility] [address] )  -     lisinopril (ZESTRIL) 5 mg tablet; Take 1 tablet (5 mg total) by mouth daily    Microalbuminuria  -     lisinopril (ZESTRIL) 5 mg tablet; Take 1 tablet (5 mg total) by mouth daily              Subjective:      Patient ID: Maninder Louie is a 61 y o  male  CC:    Chief Complaint   Patient presents with    Follow-up     pt here for a follow up  R Abhijeet       HPI:    Patient was seen by pain management today  They recommended epidural steroids  Labs are pending prior to that, and then afterwards will see if he has any improvement  Plan beyond this was if there is no long-term improvement, consider going back to spine surgeon  He is still not able to get back to work yet  The following portions of the patient's history were reviewed and updated as appropriate: allergies, current medications, past family history, past medical history, past social history, past surgical history and problem list       Review of Systems   Constitutional: Negative  HENT: Negative  Respiratory: Negative  Cardiovascular: Negative            Data to review:       Objective:    Vitals:    04/26/21 1612   BP: 148/78   BP Location: Left arm   Patient Position: Sitting   Cuff Size: Large   Pulse: 68   Resp: 16   Weight: 98 4 kg (217 lb)   Height: 5' 11" (1 803 m) Physical Exam  Vitals signs and nursing note reviewed  Constitutional:       Appearance: Normal appearance  Neck:      Vascular: No carotid bruit  Cardiovascular:      Rate and Rhythm: Normal rate and regular rhythm  Pulses: Normal pulses  Carotid pulses are 2+ on the right side and 2+ on the left side  Heart sounds: Normal heart sounds  No murmur  No gallop  Pulmonary:      Effort: Pulmonary effort is normal  No respiratory distress  Breath sounds: Normal breath sounds  No stridor  No wheezing, rhonchi or rales  Chest:      Chest wall: No tenderness  Neurological:      Mental Status: He is alert

## 2021-04-26 NOTE — PATIENT INSTRUCTIONS
Problem List Items Addressed This Visit     Atrial fibrillation ([facility] [address] )     Stable  Follow with Cardiology  No change  Continues on Eliquis  Relevant Medications    lisinopril (ZESTRIL) 5 mg tablet    sildenafil (VIAGRA) 50 MG tablet    Cervical disc disease     Patient does have significant issues with cervical disc disease that continue to be a problem  He is going to have another epidural steroid  If that does not improve things, patient can go to neuro surgery  He has previously seen orthopedic spine surgery, so will see them  Cervical radiculopathy - Primary     Per discussion of cervical disc  Patient continues to have pain  Epidural steroid, and then orthopedic spine surgeon  Erectile dysfunction     Patient does wish to have a prescription for Viagra  Understands risks  Sent to pharmacy  Relevant Medications    sildenafil (VIAGRA) 50 MG tablet    Hypertension     Blood pressure today is elevated  Would recommend increasing lisinopril from 2 5-5  Recheck at next visit in approximately 1 month  Relevant Medications    lisinopril (ZESTRIL) 5 mg tablet    Microalbuminuria    Relevant Medications    lisinopril (ZESTRIL) 5 mg tablet    Type 2 diabetes mellitus (HCC)    Relevant Medications    lisinopril (ZESTRIL) 5 mg tablet      Other Visit Diagnoses     Controlled type 2 diabetes mellitus with microalbuminuria, without long-term current use of insulin (HCC)        Relevant Medications    lisinopril (ZESTRIL) 5 mg tablet    Acute ischemic cerebrovascular accident (CVA) involving middle cerebral artery territory [facility])        Relevant Medications    lisinopril (ZESTRIL) 5 mg tablet          COVID 19 Instructions    Alfredo Mackenzie was advised to limit contact with others to essential tasks such as getting food, medications, and medical care  Proper handwashing reviewed, and Hand sanitzer when washing is not available      If the patient develops symptoms of COVID 19, the patient should call the office as soon as possible  For 7510-6950 Flu season, it is strongly recommended that Flu Vaccinations be obtained  Please try to download Google Duo  Once you do download this on your phone, you will be prompted to add your phone number to the account  After that, he should receive a text from Go800, and use that code to verify your phone number  After that, you should be able to use Google Duo to receive and make video calls  Please download Microsoft Teams to your phone or computer  We will be transitioning to this platform for Video Visits  Instructions for downloading this are available from the office  We are committed to getting you vaccinated as soon as possible and will be closely following CDC and [facility]  guidelines as they are released and revised  Please refer to our COVID-19 vaccine webpage for the most up to date information on the vaccine and our distribution efforts      Glory phillip

## 2021-04-26 NOTE — ASSESSMENT & PLAN NOTE
Blood pressure today is elevated  Would recommend increasing lisinopril from 2 5-5  Recheck at next visit in approximately 1 month

## 2021-04-26 NOTE — TELEPHONE ENCOUNTER
Pt seen per Sridhar Morrow today and blood work ordered waiting results of same and then can call to schedule MAG if appropriate please see sovs note from 4/26

## 2021-04-27 ENCOUNTER — TELEPHONE (OUTPATIENT)
Dept: PAIN MEDICINE | Facility: MEDICAL CENTER | Age: 56
End: 2021-04-27

## 2021-04-27 ENCOUNTER — TELEPHONE (OUTPATIENT)
Dept: FAMILY MEDICINE CLINIC | Facility: CLINIC | Age: 56
End: 2021-04-27

## 2021-04-27 NOTE — TELEPHONE ENCOUNTER
S/w pt and advised of JE's notation  Pt was scheduled for R MAG w/ JE on 5/5/21 at 930 am      Eliquis 3-day hold instructions  Last dose 5/1  Hold 5/2-5/5, pt understands instructions will be provided at d/c on day of procedure on when he can resume Eliquis      General pre-procedure instructions reviewed w/ pt who verbalized understanding in all matters addressed   -Will need   -Wear loose/comfortable clothing  -NPO 1 hr prior  -No vaccines 2 weeks before and 2 weeks after  -If you are prescribed an abx or develop any illness/infection, call our office to reschedule  -Will need to tolerate a mask for duration of visit  -Steroid can lower immune response, practice strict social distancing/masking/handwashing x 2 weeks following injection     4 wk f/u w/ AO scheduled on 6/2, arrival time 915 am

## 2021-04-27 NOTE — TELEPHONE ENCOUNTER
Pt has permission to hold Eliquis from Dr Zuleima Lobo  Please see recent lab work and advise prior to scheduling  Thank you

## 2021-04-27 NOTE — TELEPHONE ENCOUNTER
Pt called in to scheduled his procedure  Please be advise thank you      Patient call back # [phone]

## 2021-04-27 NOTE — TELEPHONE ENCOUNTER
Pt called in to request a referral for Dr Alfonso Seen for psychiatry, pt stated he seen this doctor before and he needs a doctor referral, pt stated he has an appointment schedule for  5/4/21  If possible please place order as soon as possible, so we can start on the insurance referral  Thank you!

## 2021-04-28 ENCOUNTER — TRANSCRIBE ORDERS (OUTPATIENT)
Dept: FAMILY MEDICINE CLINIC | Facility: CLINIC | Age: 56
End: 2021-04-28

## 2021-04-28 ENCOUNTER — TELEPHONE (OUTPATIENT)
Dept: FAMILY MEDICINE CLINIC | Facility: CLINIC | Age: 56
End: 2021-04-28

## 2021-04-28 DIAGNOSIS — E11.29 TYPE 2 DIABETES MELLITUS WITH OTHER DIABETIC KIDNEY COMPLICATION (HCC): Primary | ICD-10-CM

## 2021-04-28 NOTE — TELEPHONE ENCOUNTER
Pt called in at 4:00pm on 4/27/2021 to request an insurance referral for [facility], pt stated he "forgot he needed a referral" and he was already at his appointment when he called the office to request the referral, I made pt aware he should reschedule, due to there being a possibility this will not be covered with out the referral, pt stated "just work on it"  Faxed a written referral over to [facility] 4/28/21   Awaiting approval

## 2021-04-28 NOTE — TELEPHONE ENCOUNTER
I dont know who this is  If he needs Psychiatry, why?   Also, if he means Physiatry, then we need to figure out which one

## 2021-04-30 ENCOUNTER — TRANSCRIBE ORDERS (OUTPATIENT)
Dept: FAMILY MEDICINE CLINIC | Facility: CLINIC | Age: 56
End: 2021-04-30

## 2021-04-30 DIAGNOSIS — M54.12 RADICULOPATHY, CERVICAL REGION: ICD-10-CM

## 2021-04-30 DIAGNOSIS — M54.2 CERVICALGIA: Primary | ICD-10-CM

## 2021-05-03 NOTE — TELEPHONE ENCOUNTER
There is nothing in specific that I would do for this then, this is a direct referral from the  to this patient for evaluation  Also, I cannot find this person in epic to refer to

## 2021-05-03 NOTE — TELEPHONE ENCOUNTER
Pt states it is for Psychiatry  Dr Susan Kilgore is a psychiatrist the pt's  is recommending to help with his case  He states he needs this Dr  To go with to his  because the pt feels his neck issues are coming from work therefore things should be switched from disability to [employer]  The  is going to try and help him recoup wages and this Dr Stephanie Casey help with his case

## 2021-05-03 NOTE — TELEPHONE ENCOUNTER
I understand with the patient is saying as far as needing to see someone, but I do not understand why he needs Psychiatry  If he is talking physiatry, i [id number], then it would make sense to follow with them  Unfortunately, after doing an Internet search, I cannot find anyone named Dr Margaux Medina  I would be more than happy to send him to physiatry, but that does not appear to be what he asked for

## 2021-05-03 NOTE — TELEPHONE ENCOUNTER
Patient notified  He is not happy with the response  He kept saying over and over why not and what is the big deal  He then changed his story and said it has nothing to do with a   Then goes on to state "what does Dr Jackie Pollock have to do with this " I told him that I will pass this message to Dr Jackie Pollock, and he kept saying " then what"  I kept telling him I will pass the message on  He said he doesn't understand why he has insurance

## 2021-05-05 ENCOUNTER — HOSPITAL ENCOUNTER (OUTPATIENT)
Dept: RADIOLOGY | Facility: MEDICAL CENTER | Age: 56
Discharge: HOME | End: 2021-05-05
Attending: PHYSICAL MEDICINE & REHABILITATION | Admitting: PHYSICAL MEDICINE & REHABILITATION
Payer: COMMERCIAL

## 2021-05-05 VITALS
RESPIRATION RATE: 20 BRPM | SYSTOLIC BLOOD PRESSURE: 155 MMHG | DIASTOLIC BLOOD PRESSURE: 65 MMHG | OXYGEN SATURATION: 96 % | HEART RATE: 84 BPM | TEMPERATURE: 97.8 F

## 2021-05-05 DIAGNOSIS — M50.120 CERVICAL DISC DISORDER WITH RADICULOPATHY OF MID-CERVICAL REGION: ICD-10-CM

## 2021-05-05 PROCEDURE — 62321 NJX INTERLAMINAR CRV/THRC: CPT | Performed by: PHYSICAL MEDICINE & REHABILITATION

## 2021-05-05 RX ORDER — METHYLPREDNISOLONE ACETATE 80 MG/ML
80 INJECTION, SUSPENSION INTRA-ARTICULAR; INTRALESIONAL; INTRAMUSCULAR; PARENTERAL; SOFT TISSUE ONCE
Status: COMPLETED | OUTPATIENT
Start: 2021-05-05 | End: 2021-05-05

## 2021-05-05 RX ADMIN — IOHEXOL 1 ML: 300 INJECTION, SOLUTION INTRAVENOUS at 08:38

## 2021-05-05 RX ADMIN — METHYLPREDNISOLONE ACETATE 80 MG: 80 INJECTION, SUSPENSION INTRA-ARTICULAR; INTRALESIONAL; INTRAMUSCULAR; PARENTERAL; SOFT TISSUE at 08:39

## 2021-05-05 NOTE — H&P
History of Present Illness: The patient is a 61 y o  male who presents with complaints of neck and right arm pain    Patient Active Problem List   Diagnosis    Allergic rhinitis    Atrial fibrillation (HCC)    Chronic hepatitis C virus infection ([place] [address] )    Colon polyp    Type 2 diabetes mellitus ([place] [address] )    Erectile dysfunction    Esophageal reflux    Microalbuminuria    Nocturia    Palpitations    RBBB    Thrombocytopenia (HCC)    Tobacco abuse    Hand dermatitis    Vasculitis of skin    Chronic pain of right ankle    Cerebrovascular accident (CVA) due to embolism of precerebral artery (HCC)    Obesity    Cirrhosis of liver (HCC)    Hypertension    Muscle ache    Acute pain of right shoulder    Tinea cruris    Cervical disc disease    Hyperlipidemia    DDD (degenerative disc disease), cervical    Cervical radiculopathy    Onychomycosis       Past Medical History:   Diagnosis Date    A-fib ([place] [address] )     Colon polyp     Diabetes mellitus ([place] [address] )     GERD (gastroesophageal reflux disease)     Hyperlipidemia     Hypertension     Liver disease     Hx Hep C treated    Neck pain     Osteoarthritis     TIA (transient ischemic attack)        Past Surgical History:   Procedure Laterality Date    ROLAN HOLE FOR SUBDURAL HEMATOMA      COLONOSCOPY N/A 2/22/2016    Procedure: COLONOSCOPY;  Surgeon: Rachel Catalan MD;  Location: [facility]; Service:     INGUINAL HERNIA REPAIR      ORTHOPEDIC SURGERY      SHOULDER SURGERY Left          Current Outpatient Medications:     apixaban (Eliquis) 5 mg, Take 1 tablet (5 mg total) by mouth 2 (two) times a day, Disp: 180 tablet, Rfl: 2    atorvastatin (LIPITOR) 40 mg tablet, Take 1 tablet (40 mg total) by mouth daily, Disp: 90 tablet, Rfl: 3    clotrimazole (LOTRIMIN) 1 % cream, Apply topically 2 (two) times a day Continue for 2-4 weeks  , Disp: 60 g, Rfl: 0    digoxin (LANOXIN) 0 25 mg tablet, TAKE 1 TABLET BY MOUTH EVERY DAY, Disp: 90 tablet, Rfl: 0   econazole nitrate 1 % cream, Apply topically 2 (two) times a day, Disp: 30 g, Rfl: 0    glucose blood (ONE TOUCH ULTRA TEST) test strip, 3 (three) times a day, Disp: , Rfl:     Lancets (ONETOUCH ULTRASOFT) lancets, 3 (three) times a day, Disp: , Rfl:     lisinopril (ZESTRIL) 5 mg tablet, Take 1 tablet (5 mg total) by mouth daily, Disp: 90 tablet, Rfl: 1    metFORMIN (GLUCOPHAGE) 500 mg tablet, TAKE 1 TABLET TWICE A DAY, Disp: 180 tablet, Rfl: 3    metoprolol tartrate (LOPRESSOR) 25 mg tablet, Take 0 5 tablets (12 5 mg total) by mouth every 12 (twelve) hours, Disp: 90 tablet, Rfl: 3    Milk Thistle 250 MG CAPS, Take by mouth, Disp: , Rfl:     omeprazole (PriLOSEC) 20 mg delayed release capsule, TAKE 1 CAPSULE DAILY, Disp: 90 capsule, Rfl: 3    pregabalin (LYRICA) 150 mg capsule, Take 1 capsule (150 mg total) by mouth 2 (two) times a day, Disp: 60 capsule, Rfl: 1    sildenafil (VIAGRA) 50 MG tablet, Take 1 tablet (50 mg total) by mouth daily as needed for erectile dysfunction, Disp: 10 tablet, Rfl: 0    tadalafil (CIALIS) 10 MG tablet, Take 1 on 2 occasions, and then can increase to 2 on 2 attempts if not helpful , Disp: 10 tablet, Rfl: 0    Current Facility-Administered Medications:     iohexol (OMNIPAQUE) 300 mg/mL injection 50 mL, 50 mL, Epidural, Once, Unice Damme, DO    methylPREDNISolone acetate (DEPO-MEDROL) injection 80 mg, 80 mg, Epidural, Once, Unice Damme, DO    No Known Allergies    Physical Exam:   Vitals:    05/05/21 0816   BP: (!) 168/107   Pulse: 67   Resp: 20   Temp: 97 8 °F (36 6 °C)   SpO2: 97%     General: Awake, Alert, Oriented x 3, Mood and affect appropriate  Respiratory: Respirations even and unlabored  Cardiovascular: Peripheral pulses intact; no edema  Musculoskeletal Exam: neck and right arm pain    ASA Score: 2    Patient/Chart Verification  Patient ID Verified: Verbal  ID Band Applied: No  Consents Confirmed: Procedural  H&P( within 30 days) Verified:  To be obtained in the Pre-Procedure area  Interval H&P(within 24 hr) Complete (required for Outpatients and Surgery Admit only): To be obtained in the Pre-Procedure area  Allergies Reviewed: Yes  Anticoag/NSAID held?: Yes(Pt takes Eliquis  LD was 5/1  Pt held for 3 days  )  Currently on antibiotics?: No    Assessment:   1   Cervical disc disorder with radiculopathy of mid-cervical region        Plan: (R) MAG

## 2021-05-05 NOTE — DISCHARGE INSTRUCTIONS
Epidural Steroid Injection   WHAT YOU NEED TO KNOW:   An epidural steroid injection (ABDIRAHMAN) is a procedure to inject steroid medicine into the epidural space  The epidural space is between your spinal cord and vertebrae  Steroids reduce inflammation and fluid buildup in your spine that may be causing pain  You may be given pain medicine along with the steroids  ACTIVITY  · Do not drive or operate machinery today  · No strenuous activity today - bending, lifting, etc   · You may resume normal activites starting tomorrow - start slowly and as tolerated  · You may shower today, but no tub baths or hot tubs  · You may have numbness for several hours from the local anesthetic  Please use caution and common sense, especially with weight-bearing activities  CARE OF THE INJECTION SITE  · If you have soreness or pain, apply ice to the area today (20 minutes on/20 minutes off)  · Starting tomorrow, you may use warm, moist heat or ice if needed  · You may have an increase or change in your discomfort for 36-48 hours after your treatment  · Apply ice and continue with any pain medication you have been prescribed  · Notify [facility] if you have any of the following: redness, drainage, swelling, headache, stiff neck or fever above 100°F     SPECIAL INSTRUCTIONS  · Our office will contact you in approximately 7 days for a progress report  MEDICATIONS  · Continue to take all routine medications  · Our office may have instructed you to hold some medications  You may resume your Eliquis tomorrow after 9 am   ·     As no general anesthesia was used in today's procedure, you should not experience any side effects related to anesthesia  If you have a problem specifically related to your procedure, please call our office at [phone]  Problems not related to your procedure should be directed to your primary care physician

## 2021-05-12 ENCOUNTER — TELEPHONE (OUTPATIENT)
Dept: PAIN MEDICINE | Facility: CLINIC | Age: 56
End: 2021-05-12

## 2021-05-13 ENCOUNTER — TRANSCRIBE ORDERS (OUTPATIENT)
Dept: FAMILY MEDICINE CLINIC | Facility: CLINIC | Age: 56
End: 2021-05-13

## 2021-05-13 DIAGNOSIS — M50.30 OTHER CERVICAL DISC DEGENERATION, UNSPECIFIED CERVICAL REGION: ICD-10-CM

## 2021-05-13 DIAGNOSIS — M54.12 RADICULOPATHY, CERVICAL REGION: Primary | ICD-10-CM

## 2021-05-18 ENCOUNTER — TRANSCRIBE ORDERS (OUTPATIENT)
Dept: FAMILY MEDICINE CLINIC | Facility: CLINIC | Age: 56
End: 2021-05-18

## 2021-05-18 DIAGNOSIS — K74.60 UNSPECIFIED CIRRHOSIS OF LIVER (HCC): Primary | ICD-10-CM

## 2021-05-24 ENCOUNTER — TELEPHONE (OUTPATIENT)
Dept: FAMILY MEDICINE CLINIC | Facility: CLINIC | Age: 56
End: 2021-05-24

## 2021-05-24 NOTE — TELEPHONE ENCOUNTER
Patient called NEEDS REFERRAL FOR DOCTOR  HE HAS AN APPOINTMENT ON Friday may 28 WITH [facility]  Ilda Brumfield [phone] PHONE NUMBER [phone]  PLEASE CALL PATIENT    Abeba Charles

## 2021-05-24 NOTE — TELEPHONE ENCOUNTER
What exactly is going on with this? He has seen this specialist before for Hep C and Cirrhosis  This looks like it is and issue for insurance referral, not for me

## 2021-06-01 ENCOUNTER — APPOINTMENT (OUTPATIENT)
Dept: LAB | Facility: MEDICAL CENTER | Age: 56
End: 2021-06-01
Payer: COMMERCIAL

## 2021-06-01 ENCOUNTER — TRANSCRIBE ORDERS (OUTPATIENT)
Dept: ADMINISTRATIVE | Facility: HOSPITAL | Age: 56
End: 2021-06-01

## 2021-06-01 DIAGNOSIS — K74.60 CIRRHOSIS OF LIVER WITHOUT ASCITES, UNSPECIFIED HEPATIC CIRRHOSIS TYPE (HCC): Primary | ICD-10-CM

## 2021-06-01 DIAGNOSIS — K74.60 CIRRHOSIS OF LIVER WITHOUT ASCITES, UNSPECIFIED HEPATIC CIRRHOSIS TYPE (HCC): ICD-10-CM

## 2021-06-01 LAB
AFP-TM SERPL-MCNC: 4.4 NG/ML (ref 0.5–8)
ALBUMIN SERPL BCP-MCNC: 3.8 G/DL (ref 3.5–5)
ALP SERPL-CCNC: 54 U/L (ref 46–116)
ALT SERPL W P-5'-P-CCNC: 33 U/L (ref 12–78)
ANION GAP SERPL CALCULATED.3IONS-SCNC: 4 MMOL/L (ref 4–13)
AST SERPL W P-5'-P-CCNC: 27 U/L (ref 5–45)
BASOPHILS # BLD AUTO: 0.02 THOUSANDS/ΜL (ref 0–0.1)
BASOPHILS NFR BLD AUTO: 1 % (ref 0–1)
BILIRUB SERPL-MCNC: 2.24 MG/DL (ref 0.2–1)
BUN SERPL-MCNC: 13 MG/DL (ref 5–25)
CALCIUM SERPL-MCNC: 9 MG/DL (ref 8.3–10.1)
CHLORIDE SERPL-SCNC: 103 MMOL/L (ref 100–108)
CO2 SERPL-SCNC: 31 MMOL/L (ref 21–32)
CREAT SERPL-MCNC: 0.92 MG/DL (ref 0.6–1.3)
EOSINOPHIL # BLD AUTO: 0.08 THOUSAND/ΜL (ref 0–0.61)
EOSINOPHIL NFR BLD AUTO: 2 % (ref 0–6)
ERYTHROCYTE [DISTWIDTH] IN BLOOD BY AUTOMATED COUNT: 16.3 % (ref 11.6–15.1)
GFR SERPL CREATININE-BSD FRML MDRD: 92 ML/MIN/1.73SQ M
GLUCOSE P FAST SERPL-MCNC: 226 MG/DL (ref 65–99)
HCT VFR BLD AUTO: 37.5 % (ref 36.5–49.3)
HGB BLD-MCNC: 12.8 G/DL (ref 12–17)
IMM GRANULOCYTES # BLD AUTO: 0.02 THOUSAND/UL (ref 0–0.2)
IMM GRANULOCYTES NFR BLD AUTO: 1 % (ref 0–2)
INR PPP: 1.22 (ref 0.84–1.19)
LYMPHOCYTES # BLD AUTO: 0.91 THOUSANDS/ΜL (ref 0.6–4.47)
LYMPHOCYTES NFR BLD AUTO: 22 % (ref 14–44)
MCH RBC QN AUTO: 31.1 PG (ref 26.8–34.3)
MCHC RBC AUTO-ENTMCNC: 34.1 G/DL (ref 31.4–37.4)
MCV RBC AUTO: 91 FL (ref 82–98)
MONOCYTES # BLD AUTO: 0.69 THOUSAND/ΜL (ref 0.17–1.22)
MONOCYTES NFR BLD AUTO: 17 % (ref 4–12)
NEUTROPHILS # BLD AUTO: 2.46 THOUSANDS/ΜL (ref 1.85–7.62)
NEUTS SEG NFR BLD AUTO: 57 % (ref 43–75)
NRBC BLD AUTO-RTO: 0 /100 WBCS
PLATELET # BLD AUTO: 105 THOUSANDS/UL (ref 149–390)
PMV BLD AUTO: 10.4 FL (ref 8.9–12.7)
POTASSIUM SERPL-SCNC: 3.8 MMOL/L (ref 3.5–5.3)
PROT SERPL-MCNC: 6.4 G/DL (ref 6.4–8.2)
PROTHROMBIN TIME: 15.4 SECONDS (ref 11.6–14.5)
RBC # BLD AUTO: 4.12 MILLION/UL (ref 3.88–5.62)
SODIUM SERPL-SCNC: 138 MMOL/L (ref 136–145)
WBC # BLD AUTO: 4.18 THOUSAND/UL (ref 4.31–10.16)

## 2021-06-01 PROCEDURE — 85610 PROTHROMBIN TIME: CPT

## 2021-06-01 PROCEDURE — 82105 ALPHA-FETOPROTEIN SERUM: CPT

## 2021-06-01 PROCEDURE — 80053 COMPREHEN METABOLIC PANEL: CPT

## 2021-06-01 PROCEDURE — 36415 COLL VENOUS BLD VENIPUNCTURE: CPT

## 2021-06-01 PROCEDURE — 85025 COMPLETE CBC W/AUTO DIFF WBC: CPT

## 2021-06-02 ENCOUNTER — OFFICE VISIT (OUTPATIENT)
Dept: PAIN MEDICINE | Facility: MEDICAL CENTER | Age: 56
End: 2021-06-02
Payer: COMMERCIAL

## 2021-06-02 VITALS
SYSTOLIC BLOOD PRESSURE: 139 MMHG | DIASTOLIC BLOOD PRESSURE: 71 MMHG | HEART RATE: 99 BPM | HEIGHT: 71 IN | TEMPERATURE: 97.5 F | BODY MASS INDEX: 29.54 KG/M2 | WEIGHT: 211 LBS

## 2021-06-02 DIAGNOSIS — M54.12 CERVICAL RADICULOPATHY: ICD-10-CM

## 2021-06-02 DIAGNOSIS — M50.30 DDD (DEGENERATIVE DISC DISEASE), CERVICAL: ICD-10-CM

## 2021-06-02 DIAGNOSIS — M50.120 CERVICAL DISC DISORDER WITH RADICULOPATHY OF MID-CERVICAL REGION: Primary | ICD-10-CM

## 2021-06-02 PROCEDURE — 99213 OFFICE O/P EST LOW 20 MIN: CPT | Performed by: NURSE PRACTITIONER

## 2021-06-02 RX ORDER — PREGABALIN 150 MG/1
150 CAPSULE ORAL 2 TIMES DAILY
Qty: 60 CAPSULE | Refills: 2 | Status: SHIPPED | OUTPATIENT
Start: 2021-06-02 | End: 2022-01-17

## 2021-06-02 NOTE — PROGRESS NOTES
Assessment  1  Cervical disc disorder with radiculopathy of mid-cervical region    2  Cervical radiculopathy    3  DDD (degenerative disc disease), cervical        Plan    At this time the patient has now had multiple right cervical epidural steroid injections with no substantial relief  We feel it is reasonable to return to Dr Jayne Wallace to discuss surgical   Options again  Continue with Lyrica as prescribed this was refilled today  Follow-up visit in 3 months in our office for medication refill        My impressions and treatment recommendations were discussed in detail with the patient who verbalized understanding and had no further questions  Discharge instructions were provided  I personally saw and examined the patient and I agree with the above discussed plan of care  No orders of the defined types were placed in this encounter  No orders of the defined types were placed in this encounter  History of Present Illness    Oly Milligan is a 61 y o  male  Presents for follow-up related to his chronic right-sided neck and arm pain  Today he rates his pain 6/10  He has constant pain throughout the entirety of the day  He describes the pain as throbbing  He is status post a repeat right cervical epidural steroid injection May 5, 2021 with Dr Shayy Davey he reports 10% relief from the procedure  He tells me that it really did not help much at all  Patient is using Lyrica 150 mg twice daily with some moderate relief and no side effects or iss    I have personally reviewed and/or updated the patient's past medical history, past surgical history, family history, social history, current medications, allergies, and vital signs today  Review of Systems   Respiratory: Negative for shortness of breath  Cardiovascular: Negative for chest pain  Gastrointestinal: Negative for constipation, diarrhea, nausea and vomiting     Musculoskeletal: Positive for neck pain (radiating down the right arm) and neck stiffness  Negative for arthralgias, gait problem, joint swelling and myalgias  Skin: Negative for rash  Neurological: Negative for dizziness, seizures and weakness  All other systems reviewed and are negative  Patient Active Problem List   Diagnosis    Allergic rhinitis    Atrial fibrillation (HCC)    Chronic hepatitis C virus infection ([place] [address] )    Colon polyp    Type 2 diabetes mellitus ([place] [address] )    Erectile dysfunction    Esophageal reflux    Microalbuminuria    Nocturia    Palpitations    RBBB    Thrombocytopenia (HCC)    Tobacco abuse    Hand dermatitis    Vasculitis of skin    Chronic pain of right ankle    Cerebrovascular accident (CVA) due to embolism of precerebral artery (HCC)    Obesity    Cirrhosis of liver (HCC)    Hypertension    Muscle ache    Acute pain of right shoulder    Tinea cruris    Cervical disc disorder with radiculopathy of mid-cervical region    Hyperlipidemia    DDD (degenerative disc disease), cervical    Cervical radiculopathy    Onychomycosis       Past Medical History:   Diagnosis Date    A-fib ([place] [address] )     Colon polyp     Diabetes mellitus ([place] [address] )     GERD (gastroesophageal reflux disease)     Hyperlipidemia     Hypertension     Liver disease     Hx Hep C treated    Neck pain     Osteoarthritis     TIA (transient ischemic attack)        Past Surgical History:   Procedure Laterality Date    ROLAN HOLE FOR SUBDURAL HEMATOMA      COLONOSCOPY N/A 2/22/2016    Procedure: COLONOSCOPY;  Surgeon: Jack Prado MD;  Location: [facility];   Service:     INGUINAL HERNIA REPAIR      ORTHOPEDIC SURGERY      SHOULDER SURGERY Left        Family History   Problem Relation Age of Onset    Atrial fibrillation Mother     Hypertension Mother     No Known Problems Father        Social History     Occupational History    Occupation: EMPLOYED   Tobacco Use    Smoking status: Former Smoker     Packs/day: 0 20     Quit date: 8/11/2018     Years since quittin 8    Smokeless tobacco: Former User   Substance and Sexual Activity    Alcohol use: No     Frequency: Never    Drug use: No     Comment: [employer]    Sexual activity: Yes     Comment: [address] RELATIONSHIP       Current Outpatient Medications on File Prior to Visit   Medication Sig    apixaban (Eliquis) 5 mg Take 1 tablet (5 mg total) by mouth 2 (two) times a day    atorvastatin (LIPITOR) 40 mg tablet Take 1 tablet (40 mg total) by mouth daily    clotrimazole (LOTRIMIN) 1 % cream Apply topically 2 (two) times a day Continue for 2-4 weeks   digoxin (LANOXIN) 0 25 mg tablet TAKE 1 TABLET BY MOUTH EVERY DAY    econazole nitrate 1 % cream Apply topically 2 (two) times a day    lisinopril (ZESTRIL) 5 mg tablet Take 1 tablet (5 mg total) by mouth daily    metFORMIN (GLUCOPHAGE) 500 mg tablet TAKE 1 TABLET TWICE A DAY    metoprolol tartrate (LOPRESSOR) 25 mg tablet Take 0 5 tablets (12 5 mg total) by mouth every 12 (twelve) hours    Milk Thistle 250 MG CAPS Take by mouth    omeprazole (PriLOSEC) 20 mg delayed release capsule TAKE 1 CAPSULE DAILY    pregabalin (LYRICA) 150 mg capsule Take 1 capsule (150 mg total) by mouth 2 (two) times a day    glucose blood (ONE TOUCH ULTRA TEST) test strip 3 (three) times a day    Lancets (ONETOUCH ULTRASOFT) lancets 3 (three) times a day    sildenafil (VIAGRA) 50 MG tablet Take 1 tablet (50 mg total) by mouth daily as needed for erectile dysfunction (Patient not taking: Reported on 2021)    tadalafil (CIALIS) 10 MG tablet Take 1 on 2 occasions, and then can increase to 2 on 2 attempts if not helpful  (Patient not taking: Reported on 2021)    [DISCONTINUED] metoprolol tartrate (LOPRESSOR) 25 mg tablet TAKE 1/2 TABLETS (12 5 MG TOTAL) BY MOUTH EVERY 12 (TWELVE) HOURS     No current facility-administered medications on file prior to visit          No Known Allergies    Physical Exam    /71   Pulse 99 Temp 97 5 °F (36 4 °C)   Ht 5' 11" (1 803 m)   Wt 95 7 kg (211 lb)   BMI 29 43 kg/m²     Constitutional: normal, well developed, well nourished, alert, in no distress and non-toxic and no overt pain behavior    Eyes: anicteric  HEENT: grossly intact  Neck: supple, symmetric, trachea midline and no masses   Pulmonary:even and unlabored  Cardiovascular:No edema or pitting edema present  Skin:Normal without rashes or lesions and well hydrated  Psychiatric:Mood and affect appropriate  Neurologic:Cranial Nerves II-XII grossly intact  Musculoskeletal:normal       Imaging

## 2021-06-03 ENCOUNTER — TRANSCRIBE ORDERS (OUTPATIENT)
Dept: FAMILY MEDICINE CLINIC | Facility: CLINIC | Age: 56
End: 2021-06-03

## 2021-06-03 DIAGNOSIS — L30.9 DERMATITIS, UNSPECIFIED: Primary | ICD-10-CM

## 2021-06-03 DIAGNOSIS — B35.1 TINEA UNGUIUM: ICD-10-CM

## 2021-06-07 ENCOUNTER — OFFICE VISIT (OUTPATIENT)
Dept: OBGYN CLINIC | Facility: HOSPITAL | Age: 56
End: 2021-06-07
Payer: COMMERCIAL

## 2021-06-07 VITALS
WEIGHT: 211 LBS | HEART RATE: 101 BPM | DIASTOLIC BLOOD PRESSURE: 92 MMHG | SYSTOLIC BLOOD PRESSURE: 148 MMHG | BODY MASS INDEX: 29.54 KG/M2 | HEIGHT: 71 IN

## 2021-06-07 DIAGNOSIS — M50.30 OTHER CERVICAL DISC DEGENERATION, UNSPECIFIED CERVICAL REGION: ICD-10-CM

## 2021-06-07 DIAGNOSIS — M54.12 RADICULOPATHY, CERVICAL REGION: ICD-10-CM

## 2021-06-07 PROCEDURE — 99214 OFFICE O/P EST MOD 30 MIN: CPT | Performed by: ORTHOPAEDIC SURGERY

## 2021-06-07 NOTE — PROGRESS NOTES
Assessment/Plan:    No problem-specific Assessment & Plan notes found for this encounter  Chronic neck pain with right arm pain  · The patient is explained his cervical spine has multiple level that have degeneration   · 2nd opinion is recommended  · Follow up as needed        Problem List Items Addressed This Visit     None      Visit Diagnoses     Radiculopathy, cervical region        Other cervical disc degeneration, unspecified cervical region                Subjective:      Patient ID: Beti Mkceon is a 61 y o  male  HPI   The patient presents for follow up of cervical spine  He feels overall the same  Today he complains of neck pain with right arm pain  He rates his pain at 5-6/10  He has tried multiple injections with short-lived benefit with Dr Flores  The following portions of the patient's history were reviewed and updated as appropriate: allergies, current medications, past family history, past medical history, past social history, past surgical history and problem list     Review of Systems   Constitutional: Negative for chills, fever and unexpected weight change  HENT: Negative for hearing loss, nosebleeds and sore throat  Eyes: Negative for pain, redness and visual disturbance  Respiratory: Negative for cough, shortness of breath and wheezing  Cardiovascular: Negative for chest pain, palpitations and leg swelling  Gastrointestinal: Negative for abdominal pain, nausea and vomiting  Endocrine: Negative for polydipsia and polyuria  Genitourinary: Negative for difficulty urinating and hematuria  Skin: Negative for rash and wound  Psychiatric/Behavioral: Negative for decreased concentration and suicidal ideas  The patient is not nervous/anxious  Objective:      /92   Pulse 101   Ht 5' 11" (1 803 m)   Wt 95 7 kg (211 lb)   BMI 29 43 kg/m²          Physical Exam  Constitutional:       Appearance: He is well-developed     HENT:      Right Ear: External ear normal       Left Ear: External ear normal       Nose: Nose normal    Eyes:      Conjunctiva/sclera: Conjunctivae normal    Neck:      Musculoskeletal: Normal range of motion  Pulmonary:      Effort: Pulmonary effort is normal    Skin:     General: Skin is warm and dry  Neurological:      Mental Status: He is alert and oriented to person, place, and time  Psychiatric:         Behavior: Behavior normal          Thought Content:  Thought content normal          Judgment: Judgment normal          Patient ambulates without assistance   Tender to palpation: none  Strength C5-T1 5/5 bilaterally   Sensation C5-T1 intact bilaterally       Imaging:  None performed today      Scribe Attestation    I,:  Laovn Jeffrey am acting as a scribe while in the presence of the attending physician :       I,:  José Rousseau MD personally performed the services described in this documentation    as scribed in my presence :

## 2021-06-08 ENCOUNTER — TRANSCRIBE ORDERS (OUTPATIENT)
Dept: NEUROSURGERY | Facility: CLINIC | Age: 56
End: 2021-06-08

## 2021-06-08 DIAGNOSIS — M54.2 NECK PAIN: Primary | ICD-10-CM

## 2021-06-09 ENCOUNTER — VBI (OUTPATIENT)
Dept: ADMINISTRATIVE | Facility: OTHER | Age: 56
End: 2021-06-09

## 2021-06-10 ENCOUNTER — TRANSCRIBE ORDERS (OUTPATIENT)
Dept: FAMILY MEDICINE CLINIC | Facility: CLINIC | Age: 56
End: 2021-06-10

## 2021-06-10 DIAGNOSIS — M54.2 CERVICALGIA: Primary | ICD-10-CM

## 2021-06-11 ENCOUNTER — HOSPITAL ENCOUNTER (OUTPATIENT)
Dept: RADIOLOGY | Facility: HOSPITAL | Age: 56
Discharge: HOME | End: 2021-06-11
Payer: COMMERCIAL

## 2021-06-11 ENCOUNTER — OFFICE VISIT (OUTPATIENT)
Dept: NEUROSURGERY | Facility: CLINIC | Age: 56
End: 2021-06-11
Payer: COMMERCIAL

## 2021-06-11 VITALS
BODY MASS INDEX: 29.54 KG/M2 | HEART RATE: 73 BPM | DIASTOLIC BLOOD PRESSURE: 84 MMHG | HEIGHT: 71 IN | RESPIRATION RATE: 18 BRPM | SYSTOLIC BLOOD PRESSURE: 121 MMHG | WEIGHT: 211 LBS | TEMPERATURE: 97.4 F

## 2021-06-11 DIAGNOSIS — M50.120 CERVICAL DISC DISORDER WITH RADICULOPATHY OF MID-CERVICAL REGION: Primary | ICD-10-CM

## 2021-06-11 DIAGNOSIS — Z01.818 OTHER SPECIFIED PRE-OPERATIVE EXAMINATION: ICD-10-CM

## 2021-06-11 DIAGNOSIS — I48.20 CHRONIC ATRIAL FIBRILLATION (HCC): ICD-10-CM

## 2021-06-11 DIAGNOSIS — M54.12 CERVICAL RADICULOPATHY: ICD-10-CM

## 2021-06-11 DIAGNOSIS — E11.22 TYPE 2 DIABETES MELLITUS WITH CHRONIC KIDNEY DISEASE, WITHOUT LONG-TERM CURRENT USE OF INSULIN, UNSPECIFIED CKD STAGE (HCC): ICD-10-CM

## 2021-06-11 DIAGNOSIS — I63.10 CEREBROVASCULAR ACCIDENT (CVA) DUE TO EMBOLISM OF PRECEREBRAL ARTERY (HCC): ICD-10-CM

## 2021-06-11 DIAGNOSIS — M54.2 NECK PAIN: ICD-10-CM

## 2021-06-11 DIAGNOSIS — M50.120 CERVICAL DISC DISORDER WITH RADICULOPATHY OF MID-CERVICAL REGION: ICD-10-CM

## 2021-06-11 PROBLEM — M50.222 HERNIATION OF INTERVERTEBRAL DISC AT C5-C6 LEVEL: Status: ACTIVE | Noted: 2021-06-11

## 2021-06-11 PROBLEM — M79.601 RIGHT ARM PAIN: Status: ACTIVE | Noted: 2021-06-11

## 2021-06-11 PROCEDURE — 3074F SYST BP LT 130 MM HG: CPT | Performed by: PHYSICIAN ASSISTANT

## 2021-06-11 PROCEDURE — 1036F TOBACCO NON-USER: CPT | Performed by: PHYSICIAN ASSISTANT

## 2021-06-11 PROCEDURE — 99204 OFFICE O/P NEW MOD 45 MIN: CPT | Performed by: PHYSICIAN ASSISTANT

## 2021-06-11 PROCEDURE — 72052 X-RAY EXAM NECK SPINE 6/>VWS: CPT

## 2021-06-11 PROCEDURE — 3066F NEPHROPATHY DOC TX: CPT | Performed by: PHYSICIAN ASSISTANT

## 2021-06-11 PROCEDURE — 3079F DIAST BP 80-89 MM HG: CPT | Performed by: PHYSICIAN ASSISTANT

## 2021-06-11 PROCEDURE — 3008F BODY MASS INDEX DOCD: CPT | Performed by: PHYSICIAN ASSISTANT

## 2021-06-11 NOTE — LETTER
June 11, 2021     Rolly Pittman, [address]    Patient: James Altamirano   YOB: 1965   Date of Visit: 6/11/2021       Dear Dr Sue Fontanez: Thank you for referring Ginger Rodriguez to me for evaluation  Below are my notes for this consultation  If you have questions, please do not hesitate to call me  I look forward to following your patient along with you  Sincerely,        Usman Bentley MD        CC: Referral Self  MD Josh Howard DO Liborio Mingle, PA-C  6/11/2021 12:04 PM  Sign when Signing Visit  Patient ID: James Altamirano is a 61 y o  male  Diagnoses and all orders for this visit:    Cervical disc disorder with radiculopathy of mid-cervical region  -     MRI cervical spine without contrast; Future  -     XR spine cervical complete 6+ vw flex/ext/obl; Future    Neck pain  -     Ambulatory referral to Neurosurgery  -     MRI cervical spine without contrast; Future  -     XR spine cervical complete 6+ vw flex/ext/obl; Future    Cervical radiculopathy  -     MRI cervical spine without contrast; Future  -     XR spine cervical complete 6+ vw flex/ext/obl; Future    Cerebrovascular accident (CVA) due to embolism of precerebral artery ([address] )  -     MRI cervical spine without contrast; Future  -     XR spine cervical complete 6+ vw flex/ext/obl; Future    Chronic atrial fibrillation (HCC)  -     MRI cervical spine without contrast; Future  -     XR spine cervical complete 6+ vw flex/ext/obl; Future    Type 2 diabetes mellitus with chronic kidney disease, without long-term current use of insulin, unspecified CKD stage ([address] )  -     MRI cervical spine without contrast; Future  -     XR spine cervical complete 6+ vw flex/ext/obl; Future    Other specified pre-operative examination          Assessment/Plan:  Very pleasant 62-year-old male, referred for 2nd opinion by Dr Katiana Desai      He has complaint of neck pain and right arm pain progressively worsening over the last almost 1 year  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 and should there prove to be an abnormality he used responsible for pulling 100 lb battery out of the assembly line with a fair degree haste as the line moved quite rapidly  He knows progressively worsening pain until last summer when it became quite problematic  He reports his right arm pain is aggravated with activity, as well as is right neck pain/posterior neck pain  He is right-hand dominant  He denies myelopathic signs such as difficulty with buttons, difficulty with knife and fork, difficulty with zippers, gait or balance disturbance, falls or near falls, bladder incontinence  He has had MRI of the lumbar spine, 10/14/20, the study was very carefully reviewed in detail by Dr Rex Hubbard, which reveals a C5-C5 disc with central herniation, and a trace of left foraminal narrowing, no cord signal changes are noted, the cord is flattened somewhat  The other disc spaces revealed mild spondylosis, without central or foraminal narrowing  Study was reviewed in detail with the patient  He has had epidural steroid injections by Dr Libia Lucero, 3/31/21 patient reports for at least 2 weeks this was quite effective, he had a repeat injection 5/5/21 this did not have the same significant effect the patient reports  He continues on Lyrica, from pain management  He has had a course of physical therapy 10/28/20 through 12/21/20 with no significant improvement in either the neck pain or the arm symptoms      On examination today he is awake, alert, oriented x3, motor examination of the upper extremities is 5 x 5 for power, rapid alternating movements are intact, there is no pronator drift, reflexes triceps, biceps and brachioradialis are intact and symmetric at +2, Tigist was negative bilaterally, Spurling sign was also negative  Gait balance was unremarkable, in a standing position the able to easily lift on his heel and toes and, bend at the waist without any evidence of difficulty  Romberg was negative, Babinski was also negative, there was no ankle clonus  At this juncture as his MRI cervical spine is more than 6month-old, and his symptoms persist, a repeat study without contrast is requested in addition flexion-extension films of the cervical spine are also requested  Further follow-up with Neurosurgery is planned after imaging  Dr Raiza Higgins did review various management options which would include an anterior cervical diskectomy, but cautioned that with his persistent right arm symptoms, and the fact that there was no findings to suggest foraminal narrowing on the right where his symptoms are additional imaging is necessary prior to any intervention  These findings, impressions recommendations reviewed in great detail with the patient by Dr Raiza Higgins and myself, he expressed understanding and agreement  Return in about 3 weeks (around 7/2/2021) for Review updated MRI cervical spine and flex ex films, Dr Leyva January  Chief Complaint  Posterior neck pain, right arm pain, numbness and tingling of his right arm it at times, he denies left arm symptoms  HPI       The following portions of the patient's history were reviewed and updated as appropriate: allergies, current medications, past family history, past medical history, past social history, past surgical history and problem list     Review of Systems   Constitutional: Negative  HENT: Negative  Eyes: Negative  Respiratory: Negative  Cardiovascular: Negative  H/o HTN/ Hyperlipidemia   Gastrointestinal: Negative  H/o GERD   Endocrine: Negative  Diabetic   Genitourinary: Negative      Musculoskeletal: Positive for neck pain (b/l neck pain radiates to right arm and hand sonce 8/2020 )  Negative for back pain  PT--10/2020 -- No relief   Pain Man-- 2 injection 10/21 -- No Relief   Skin: Negative  Allergic/Immunologic: Negative  Neurological: Positive for numbness (an tingling on right arm) and headaches (almost every day due to neck pain--- Takes Ibuprofen -PRN mild relief)  Negative for weakness  Hematological: Bruises/bleeds easily (On Eliquis)  Psychiatric/Behavioral: Negative  All other systems reviewed and are negative  Objective:    Physical Exam  Constitutional:       Appearance: He is well-developed  HENT:      Head: Normocephalic and atraumatic  Eyes:      Pupils: Pupils are equal, round, and reactive to light  Cardiovascular:      Rate and Rhythm: Normal rate  Pulmonary:      Effort: Pulmonary effort is normal       Breath sounds: Normal breath sounds  Skin:     General: Skin is warm and dry  Neurological:      Mental Status: He is alert and oriented to person, place, and time  Neurologic Exam     Mental Status   Oriented to person, place, and time  Cranial Nerves     CN III, IV, VI   Pupils are equal, round, and reactive to light

## 2021-06-11 NOTE — PROGRESS NOTES
Patient ID: Desire Jay is a 61 y o  male  Diagnoses and all orders for this visit:    Cervical disc disorder with radiculopathy of mid-cervical region  -     MRI cervical spine without contrast; Future  -     XR spine cervical complete 6+ vw flex/ext/obl; Future    Neck pain  -     Ambulatory referral to Neurosurgery  -     MRI cervical spine without contrast; Future  -     XR spine cervical complete 6+ vw flex/ext/obl; Future    Cervical radiculopathy  -     MRI cervical spine without contrast; Future  -     XR spine cervical complete 6+ vw flex/ext/obl; Future    Cerebrovascular accident (CVA) due to embolism of precerebral artery ([facility] )  -     MRI cervical spine without contrast; Future  -     XR spine cervical complete 6+ vw flex/ext/obl; Future    Chronic atrial fibrillation (HCC)  -     MRI cervical spine without contrast; Future  -     XR spine cervical complete 6+ vw flex/ext/obl; Future    Type 2 diabetes mellitus with chronic kidney disease, without long-term current use of insulin, unspecified CKD stage ([facility] )  -     MRI cervical spine without contrast; Future  -     XR spine cervical complete 6+ vw flex/ext/obl; Future    Other specified pre-operative examination          Assessment/Plan:  Very pleasant 56-year-old male, referred for 2nd opinion by Dr Ely Springer  He has complaint of neck pain and right arm pain progressively worsening over the last almost 1 year  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 and should there prove to be an abnormality he used responsible for pulling 100 lb battery out of the assembly line with a fair degree haste as the line moved quite rapidly  He knows progressively worsening pain until last summer when it became quite problematic      He reports his right arm pain is aggravated with activity, as well as is right neck pain/posterior neck pain     He is right-hand dominant  He denies myelopathic signs such as difficulty with buttons, difficulty with knife and fork, difficulty with zippers, gait or balance disturbance, falls or near falls, bladder incontinence  He has had MRI of the lumbar spine, 10/14/20, the study was very carefully reviewed in detail by Dr Hayden Tomlin, which reveals a C5-C5 disc with central herniation, and a trace of left foraminal narrowing, no cord signal changes are noted, the cord is flattened somewhat  The other disc spaces revealed mild spondylosis, without central or foraminal narrowing  Study was reviewed in detail with the patient  He has had epidural steroid injections by Dr Xin Mcgregor, 3/31/21 patient reports for at least 2 weeks this was quite effective, he had a repeat injection 5/5/21 this did not have the same significant effect the patient reports  He continues on Lyrica, from pain management  He has had a course of physical therapy 10/28/20 through 12/21/20 with no significant improvement in either the neck pain or the arm symptoms  On examination today he is awake, alert, oriented x3, motor examination of the upper extremities is 5 x 5 for power, rapid alternating movements are intact, there is no pronator drift, reflexes triceps, biceps and brachioradialis are intact and symmetric at +2, Tigist was negative bilaterally, Spurling sign was also negative  Gait balance was unremarkable, in a standing position the able to easily lift on his heel and toes and, bend at the waist without any evidence of difficulty  Romberg was negative, Babinski was also negative, there was no ankle clonus  At this juncture as his MRI cervical spine is more than 6month-old, and his symptoms persist, a repeat study without contrast is requested in addition flexion-extension films of the cervical spine are also requested  Further follow-up with Neurosurgery is planned after imaging        Dr Hayden Tomlin did review various management options which would include an anterior cervical diskectomy, but cautioned that with his persistent right arm symptoms, and the fact that there was no findings to suggest foraminal narrowing on the right where his symptoms are additional imaging is necessary prior to any intervention  These findings, impressions recommendations reviewed in great detail with the patient by Dr Sudeep Joya and myself, he expressed understanding and agreement  Return in about 3 weeks (around 7/2/2021) for Review updated MRI cervical spine and flex ex films, Dr Hayley Garcia  Chief Complaint  Posterior neck pain, right arm pain, numbness and tingling of his right arm it at times, he denies left arm symptoms  HPI       The following portions of the patient's history were reviewed and updated as appropriate: allergies, current medications, past family history, past medical history, past social history, past surgical history and problem list     Review of Systems   Constitutional: Negative  HENT: Negative  Eyes: Negative  Respiratory: Negative  Cardiovascular: Negative  H/o HTN/ Hyperlipidemia   Gastrointestinal: Negative  H/o GERD   Endocrine: Negative  Diabetic   Genitourinary: Negative  Musculoskeletal: Positive for neck pain (b/l neck pain radiates to right arm and hand sonce 8/2020 )  Negative for back pain  PT--10/2020 -- No relief   Pain Man-- 2 injection 10/21 -- No Relief   Skin: Negative  Allergic/Immunologic: Negative  Neurological: Positive for numbness (an tingling on right arm) and headaches (almost every day due to neck pain--- Takes Ibuprofen -PRN mild relief)  Negative for weakness  Hematological: Bruises/bleeds easily (On Eliquis)  Psychiatric/Behavioral: Negative  All other systems reviewed and are negative  Objective:    Physical Exam  Constitutional:       Appearance: He is well-developed     HENT:      Head: Normocephalic and atraumatic  Eyes:      Pupils: Pupils are equal, round, and reactive to light  Cardiovascular:      Rate and Rhythm: Normal rate  Pulmonary:      Effort: Pulmonary effort is normal       Breath sounds: Normal breath sounds  Skin:     General: Skin is warm and dry  Neurological:      Mental Status: He is alert and oriented to person, place, and time  Neurologic Exam     Mental Status   Oriented to person, place, and time  Cranial Nerves     CN III, IV, VI   Pupils are equal, round, and reactive to light

## 2021-06-11 NOTE — PATIENT INSTRUCTIONS
Proceed for MRI cervical spine at a [facility] facility as discussed  Proceed for flexion-extension films of the cervical spine as discussed  Further follow-up with Neurosurgery, Dr Mejia Running after imaging is completed      Continue to follow with pain management per their protocols

## 2021-06-15 DIAGNOSIS — I48.21 PERMANENT ATRIAL FIBRILLATION (HCC): ICD-10-CM

## 2021-06-15 DIAGNOSIS — E11.29 CONTROLLED TYPE 2 DIABETES MELLITUS WITH MICROALBUMINURIA, WITHOUT LONG-TERM CURRENT USE OF INSULIN (HCC): ICD-10-CM

## 2021-06-15 DIAGNOSIS — I63.519 ACUTE ISCHEMIC CEREBROVASCULAR ACCIDENT (CVA) INVOLVING MIDDLE CEREBRAL ARTERY TERRITORY (HCC): ICD-10-CM

## 2021-06-15 DIAGNOSIS — E11.9 TYPE 2 DIABETES MELLITUS WITHOUT COMPLICATION, UNSPECIFIED WHETHER LONG TERM INSULIN USE (HCC): ICD-10-CM

## 2021-06-15 DIAGNOSIS — R80.9 CONTROLLED TYPE 2 DIABETES MELLITUS WITH MICROALBUMINURIA, WITHOUT LONG-TERM CURRENT USE OF INSULIN (HCC): ICD-10-CM

## 2021-06-24 ENCOUNTER — TELEPHONE (OUTPATIENT)
Dept: OTHER | Facility: OTHER | Age: 56
End: 2021-06-24

## 2021-06-25 ENCOUNTER — APPOINTMENT (OUTPATIENT)
Dept: LAB | Facility: MEDICAL CENTER | Age: 56
End: 2021-06-25
Payer: COMMERCIAL

## 2021-06-25 DIAGNOSIS — Z01.818 PREOPERATIVE EXAMINATION, UNSPECIFIED: ICD-10-CM

## 2021-06-25 LAB
ALBUMIN SERPL BCP-MCNC: 4.1 G/DL (ref 3.5–5)
ALP SERPL-CCNC: 55 U/L (ref 46–116)
ALT SERPL W P-5'-P-CCNC: 31 U/L (ref 12–78)
ANION GAP SERPL CALCULATED.3IONS-SCNC: 4 MMOL/L (ref 4–13)
AST SERPL W P-5'-P-CCNC: 17 U/L (ref 5–45)
BASOPHILS # BLD AUTO: 0.02 THOUSANDS/ΜL (ref 0–0.1)
BASOPHILS NFR BLD AUTO: 0 % (ref 0–1)
BILIRUB SERPL-MCNC: 1.31 MG/DL (ref 0.2–1)
BUN SERPL-MCNC: 19 MG/DL (ref 5–25)
CALCIUM SERPL-MCNC: 9 MG/DL (ref 8.3–10.1)
CHLORIDE SERPL-SCNC: 104 MMOL/L (ref 100–108)
CO2 SERPL-SCNC: 30 MMOL/L (ref 21–32)
CREAT SERPL-MCNC: 0.96 MG/DL (ref 0.6–1.3)
EOSINOPHIL # BLD AUTO: 0.15 THOUSAND/ΜL (ref 0–0.61)
EOSINOPHIL NFR BLD AUTO: 3 % (ref 0–6)
ERYTHROCYTE [DISTWIDTH] IN BLOOD BY AUTOMATED COUNT: 15.7 % (ref 11.6–15.1)
GFR SERPL CREATININE-BSD FRML MDRD: 87 ML/MIN/1.73SQ M
GLUCOSE P FAST SERPL-MCNC: 214 MG/DL (ref 65–99)
HCT VFR BLD AUTO: 43.2 % (ref 36.5–49.3)
HGB BLD-MCNC: 14.5 G/DL (ref 12–17)
IMM GRANULOCYTES # BLD AUTO: 0.02 THOUSAND/UL (ref 0–0.2)
IMM GRANULOCYTES NFR BLD AUTO: 0 % (ref 0–2)
LYMPHOCYTES # BLD AUTO: 1.04 THOUSANDS/ΜL (ref 0.6–4.47)
LYMPHOCYTES NFR BLD AUTO: 18 % (ref 14–44)
MCH RBC QN AUTO: 31.2 PG (ref 26.8–34.3)
MCHC RBC AUTO-ENTMCNC: 33.6 G/DL (ref 31.4–37.4)
MCV RBC AUTO: 93 FL (ref 82–98)
MONOCYTES # BLD AUTO: 0.53 THOUSAND/ΜL (ref 0.17–1.22)
MONOCYTES NFR BLD AUTO: 9 % (ref 4–12)
NEUTROPHILS # BLD AUTO: 3.9 THOUSANDS/ΜL (ref 1.85–7.62)
NEUTS SEG NFR BLD AUTO: 70 % (ref 43–75)
NRBC BLD AUTO-RTO: 0 /100 WBCS
PLATELET # BLD AUTO: 115 THOUSANDS/UL (ref 149–390)
PMV BLD AUTO: 10.2 FL (ref 8.9–12.7)
POTASSIUM SERPL-SCNC: 3.8 MMOL/L (ref 3.5–5.3)
PROT SERPL-MCNC: 7 G/DL (ref 6.4–8.2)
RBC # BLD AUTO: 4.65 MILLION/UL (ref 3.88–5.62)
SODIUM SERPL-SCNC: 138 MMOL/L (ref 136–145)
WBC # BLD AUTO: 5.66 THOUSAND/UL (ref 4.31–10.16)

## 2021-06-25 PROCEDURE — 36415 COLL VENOUS BLD VENIPUNCTURE: CPT

## 2021-06-25 PROCEDURE — 85025 COMPLETE CBC W/AUTO DIFF WBC: CPT

## 2021-06-25 PROCEDURE — 80053 COMPREHEN METABOLIC PANEL: CPT

## 2021-06-28 ENCOUNTER — CLINICAL SUPPORT (OUTPATIENT)
Dept: URGENT CARE | Facility: MEDICAL CENTER | Age: 56
End: 2021-06-28
Payer: COMMERCIAL

## 2021-06-28 DIAGNOSIS — Z01.818 PREOPERATIVE EXAMINATION, UNSPECIFIED: ICD-10-CM

## 2021-06-28 LAB
ATRIAL RATE: 53 BPM
QRS AXIS: 36 DEGREES
QRSD INTERVAL: 98 MS
QT INTERVAL: 374 MS
QTC INTERVAL: 420 MS
T WAVE AXIS: -70 DEGREES
VENTRICULAR RATE: 76 BPM

## 2021-06-28 PROCEDURE — 93005 ELECTROCARDIOGRAM TRACING: CPT

## 2021-06-28 PROCEDURE — 93010 ELECTROCARDIOGRAM REPORT: CPT

## 2021-06-29 ENCOUNTER — TELEPHONE (OUTPATIENT)
Dept: FAMILY MEDICINE CLINIC | Facility: CLINIC | Age: 56
End: 2021-06-29

## 2021-06-29 LAB
QRS AXIS: 36 DEGREES
QRSD INTERVAL: 98 MS
QT INTERVAL: 374 MS
QTC INTERVAL: 420 MS
T WAVE AXIS: -70 DEGREES
VENTRICULAR RATE: 76 BPM

## 2021-06-29 PROCEDURE — 93010 ELECTROCARDIOGRAM REPORT: CPT | Performed by: INTERNAL MEDICINE

## 2021-06-30 ENCOUNTER — TELEPHONE (OUTPATIENT)
Dept: FAMILY MEDICINE CLINIC | Facility: CLINIC | Age: 56
End: 2021-06-30

## 2021-06-30 ENCOUNTER — OFFICE VISIT (OUTPATIENT)
Dept: FAMILY MEDICINE CLINIC | Facility: CLINIC | Age: 56
End: 2021-06-30
Payer: COMMERCIAL

## 2021-06-30 VITALS
BODY MASS INDEX: 29.68 KG/M2 | HEIGHT: 71 IN | WEIGHT: 212 LBS | SYSTOLIC BLOOD PRESSURE: 132 MMHG | HEART RATE: 84 BPM | DIASTOLIC BLOOD PRESSURE: 68 MMHG

## 2021-06-30 DIAGNOSIS — B18.2 CHRONIC HEPATITIS C WITHOUT HEPATIC COMA (HCC): ICD-10-CM

## 2021-06-30 DIAGNOSIS — I48.0 PAROXYSMAL ATRIAL FIBRILLATION (HCC): ICD-10-CM

## 2021-06-30 DIAGNOSIS — I63.10 CEREBROVASCULAR ACCIDENT (CVA) DUE TO EMBOLISM OF PRECEREBRAL ARTERY (HCC): ICD-10-CM

## 2021-06-30 DIAGNOSIS — E11.22 TYPE 2 DIABETES MELLITUS WITH CHRONIC KIDNEY DISEASE, WITHOUT LONG-TERM CURRENT USE OF INSULIN, UNSPECIFIED CKD STAGE (HCC): ICD-10-CM

## 2021-06-30 DIAGNOSIS — K21.9 GASTROESOPHAGEAL REFLUX DISEASE WITHOUT ESOPHAGITIS: ICD-10-CM

## 2021-06-30 DIAGNOSIS — D69.6 THROMBOCYTOPENIA (HCC): ICD-10-CM

## 2021-06-30 DIAGNOSIS — E78.2 MIXED HYPERLIPIDEMIA: ICD-10-CM

## 2021-06-30 DIAGNOSIS — I10 ESSENTIAL HYPERTENSION: ICD-10-CM

## 2021-06-30 DIAGNOSIS — Z01.818 PRE-OP EXAMINATION: Primary | ICD-10-CM

## 2021-06-30 DIAGNOSIS — M50.222 HERNIATION OF INTERVERTEBRAL DISC AT C5-C6 LEVEL: ICD-10-CM

## 2021-06-30 PROCEDURE — 3008F BODY MASS INDEX DOCD: CPT | Performed by: FAMILY MEDICINE

## 2021-06-30 PROCEDURE — 3075F SYST BP GE 130 - 139MM HG: CPT | Performed by: FAMILY MEDICINE

## 2021-06-30 PROCEDURE — 1036F TOBACCO NON-USER: CPT | Performed by: FAMILY MEDICINE

## 2021-06-30 PROCEDURE — 99214 OFFICE O/P EST MOD 30 MIN: CPT | Performed by: FAMILY MEDICINE

## 2021-06-30 PROCEDURE — 3078F DIAST BP <80 MM HG: CPT | Performed by: FAMILY MEDICINE

## 2021-06-30 PROCEDURE — 3066F NEPHROPATHY DOC TX: CPT | Performed by: FAMILY MEDICINE

## 2021-06-30 NOTE — ASSESSMENT & PLAN NOTE
Stable  Currently on omeprazole  No changes  Should take medication up to the morning of surgery    Day of surgery: directions per Anesthesia

## 2021-06-30 NOTE — ASSESSMENT & PLAN NOTE
Patient's blood pressure today was quite good  Because of the surgery that is coming up, would recommend that he hold the lisinopril prior surgery  This should be held approximately 3 days  This is to reduce the chances of acute kidney injury

## 2021-06-30 NOTE — PATIENT INSTRUCTIONS
Pre-Surgery Instructions:   Medication Instructions    apixaban (Eliquis) 5 mg Stop taking 3 days prior to surgery    ciclopirox (PENLAC) 8 % solution per anesthesia guidelines     clotrimazole (LOTRIMIN) 1 % cream per anesthesia guidelines     digoxin (LANOXIN) 0 25 mg tablet per anesthesia guidelines     glucose blood (ONE TOUCH ULTRA TEST) test strip per anesthesia guidelines     Lancets (ONETOUCH ULTRASOFT) lancets per anesthesia guidelines     lisinopril (ZESTRIL) 5 mg tablet Stop taking 3 days prior to surgery    metFORMIN (GLUCOPHAGE) 500 mg tablet Stop taking 3 days prior to surgery    metoprolol tartrate (LOPRESSOR) 25 mg tablet Take morning of surgery    Milk Thistle 250 MG CAPS Stop taking 3 days prior to surgery    omeprazole (PriLOSEC) 20 mg delayed release capsule per anesthesia guidelines     tadalafil (CIALIS) 10 MG tablet Stop taking 3 days prior to surgery     Problem List Items Addressed This Visit     Atrial fibrillation ([facility] )     A continue with the Eliquis  Hold 3 days prior to surgery  As far as digoxin is concerned, he should take that per anesthesia guidelines  Metoprolol should be continued the morning of surgery as well  Cerebrovascular accident (CVA) due to embolism of precerebral artery ([address] )     No significant persistent problems at this point  Follow-up in the future  Restart Eliquis as soon as practical after surgery  Chronic hepatitis C virus infection ([facility] )     Follows with EP GI  Has been doing quite well  LFTs normal          Esophageal reflux     Stable  Currently on omeprazole  No changes  Should take medication up to the morning of surgery  Day of surgery: directions per Anesthesia         Herniation of intervertebral disc at C5-C6 level     Patient represents low risk for the planned surgery  Hyperlipidemia     Stable  No change  Hypertension     Patient's blood pressure today was quite good    Because of the surgery that is coming up, would recommend that he hold the lisinopril prior surgery  This should be held approximately 3 days  This is to reduce the chances of acute kidney injury  Thrombocytopenia (HCC)     Platelets are at 468  This is minor decreased period is stable  No changes  Does represent a minor increase in bleeding risk  Type 2 diabetes mellitus (HCC)       Lab Results   Component Value Date    HGBA1C 6 9 (H) 02/11/2021   Patient's A1c is at 6 9, which is quite reasonable  I would recommend that he hold the metformin prior to surgery, approximately 3 days  This is because of the potential for needing IV dye in the hospital   Other than this, continue without any incident  Should restart as soon as practical after surgery  Other Visit Diagnoses     Pre-op examination    -  Primary    Patient's physical exam today was normal   Low risk for planned surgery  COVID 19 Instructions    Stevenalexsander Astorga was advised to limit contact with others to essential tasks such as getting food, medications, and medical care  Proper handwashing reviewed, and Hand sanitzer when washing is not available  If the patient develops symptoms of COVID 19, the patient should call the office as soon as possible  For [id number] Flu season, it is strongly recommended that Flu Vaccinations be obtained  Virtual Visits may be conducted in several ways: Catia: You should get a text message when the provider is ready to see you  Click on the link in the text message, and the call should start  You will need to type in your name, and allow camera and microphone access  This is HIPPA compliant, and secure  Please try to download [employer] Duo  Once you do download this on your phone, you will be prompted to add your phone number to the account  After that, he should receive a text from [employer], and use that code to verify your phone number    After that, you should be able to use [employer] Duo to receive and make video calls  Please download [employer] Teams to your phone or computer  You would get an email with the meeting after scheduling with the office  You will Join the meeting, and wait there till the provider joins as well  Instructions for downloading this are available from the office  This is HIPPA compliant, and secure  We are committed to getting you vaccinated as soon as possible and will be closely following CDC and [facility]  guidelines as they are released and revised  Please refer to our COVID-19 vaccine webpage for the most up to date information on the vaccine and our distribution efforts  [employer] tn    OUR NEW LOCATION:  Starting around 28June2021, our new location and phone are:    [address], [address] [place], [place], [address]  Fax: [phone]    Lab services and OB/GYN will be at this location as well

## 2021-06-30 NOTE — PROGRESS NOTES
Presurgical Evaluation    Subjective:      Patient ID: Patrick Alexander is a 61 y o  male  Chief Complaint   Patient presents with    Pre-op Exam     patient is having ACDF C5-C6 with interbody spacer with cervical plate with Dr Kevin Elizondo on 7/7/21  ak       Patient continues to have some issues with disc issues in the cervical spine  Went to neuro surgery  Orthopedic neurosurgery recommended that the patient have surgery at this point  He is going next week for that  Atrial fibrillation:  Currently on metoprolol for heart rate control, digoxin, and Eliquis  Has not had any problems  Has followed with Cardiology before  EKG was just done at [facility], and did show atrial fibrillation, consistent with prior EKG  Diabetes:  Currently on metformin  Denies any current problems  Last A1c was quite good  CVA:  Patient did have CVA previously  This was from atrial fibrillation, and was prior to being on anticoagulation  Has not had any significant residuals from that  Doing quite well  Hep C:  Noted before  No specific concerns right now  Hypertension:  Currently on metoprolol and lisinopril  Reviewed about lisinopril and the potential for acute kidney injury  The following portions of the patient's history were reviewed and updated as appropriate: allergies, current medications, past family history, past medical history, past social history, past surgical history and problem list     Procedure date: 7July2021    Surgeon:  Harle Osgood  Planned procedure:  ACDF c5-6  With spacer  Diagnosis for procedure:  Cervical disc disease  Prior anesthesia: Yes   General; Complications:  None / Tolerated well  MAC; Complications:  None / Tolerated well    CAD History: Arrhythmia  A-Fib   NOTE: Patient should see Cardiology if time available before surgery, and if appropriate      Pulmonary History: None    Renal history: None    Diabetes History:  Type 2  Controlled     Neurological History: CVA  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  Was before he was on anticoagulation     On Immunosuppressant meds/biologics: No      Review of Systems   Constitutional: Negative  HENT: Negative  Eyes: Negative  Respiratory: Negative  Cardiovascular: Negative  Gastrointestinal: Negative  Endocrine: Negative  Genitourinary: Negative  Musculoskeletal: Positive for neck pain and neck stiffness  Skin: Negative  Allergic/Immunologic: Negative  Neurological: Negative  Hematological: Negative  Psychiatric/Behavioral: Negative  Current Outpatient Medications   Medication Sig Dispense Refill    apixaban (Eliquis) 5 mg Take 1 tablet (5 mg total) by mouth 2 (two) times a day 180 tablet 2    ciclopirox (PENLAC) 8 % solution APPLY TO AFFECTED NAILS ONCE DAILY      clotrimazole (LOTRIMIN) 1 % cream Apply topically 2 (two) times a day Continue for 2-4 weeks  60 g 0    digoxin (LANOXIN) 0 25 mg tablet TAKE 1 TABLET BY MOUTH EVERY DAY 90 tablet 0    glucose blood (ONE TOUCH ULTRA TEST) test strip 3 (three) times a day      Lancets (ONETOUCH ULTRASOFT) lancets 3 (three) times a day      lisinopril (ZESTRIL) 5 mg tablet Take 1 tablet (5 mg total) by mouth daily 90 tablet 1    metFORMIN (GLUCOPHAGE) 500 mg tablet TAKE 1 TABLET TWICE A  tablet 3    metoprolol tartrate (LOPRESSOR) 25 mg tablet Take 0 5 tablets (12 5 mg total) by mouth every 12 (twelve) hours 90 tablet 1    Milk Thistle 250 MG CAPS Take by mouth      omeprazole (PriLOSEC) 20 mg delayed release capsule TAKE 1 CAPSULE DAILY 90 capsule 3    tadalafil (CIALIS) 10 MG tablet Take 1 on 2 occasions, and then can increase to 2 on 2 attempts if not helpful   10 tablet 0    atorvastatin (LIPITOR) 40 mg tablet Take 1 tablet (40 mg total) by mouth daily (Patient not taking: Reported on 6/30/2021) 90 tablet 3    econazole nitrate 1 % cream Apply topically 2 (two) times a day (Patient not taking: Reported on 6/30/2021) 30 g 0    pregabalin (LYRICA) 150 mg capsule Take 1 capsule (150 mg total) by mouth 2 (two) times a day (Patient not taking: Reported on 6/30/2021) 60 capsule 2    sildenafil (VIAGRA) 50 MG tablet Take 1 tablet (50 mg total) by mouth daily as needed for erectile dysfunction (Patient not taking: Reported on 6/30/2021) 10 tablet 0     No current facility-administered medications for this visit  Allergies on file:   Patient has no known allergies  Patient Active Problem List   Diagnosis    Allergic rhinitis    Atrial fibrillation (HCC)    [facility]    Colon polyp    Type 2 diabetes mellitus ([facility]    Erectile dysfunction    Esophageal reflux    Microalbuminuria    Nocturia    Palpitations    RBBB    Thrombocytopenia (HCC)    Tobacco abuse    Hand dermatitis    Vasculitis of skin    Chronic pain of right ankle    Cerebrovascular accident (CVA) due to embolism of precerebral artery (HCC)    Obesity    Cirrhosis of liver (HCC)    Hypertension    Muscle ache    Acute pain of right shoulder    Tinea cruris    Cervical disc disorder with radiculopathy of mid-cervical region    Hyperlipidemia    DDD (degenerative disc disease), cervical    Cervical radiculopathy    Onychomycosis    Herniation of intervertebral disc at C5-C6 level    Right arm pain    Cervicalgia    Other specified pre-operative examination        Past Medical History:   Diagnosis Date    [facility]-fib ([facility] )     Colon polyp     Diabetes mellitus [facility]     GERD (gastroesophageal reflux disease)     Hyperlipidemia     Hypertension     Liver disease     Hx Hep C treated    Neck pain     Osteoarthritis     TIA (transient ischemic attack)        Past Surgical History:   Procedure Laterality Date    ROLAN HOLE FOR SUBDURAL HEMATOMA      COLONOSCOPY N/A 2/22/2016    Procedure: COLONOSCOPY;  Surgeon: Ernesto Malone MD;  Location: [facility];   Service:     INGUINAL HERNIA REPAIR      ORTHOPEDIC SURGERY      SHOULDER SURGERY Left        Family History   Problem Relation Age of Onset    Atrial fibrillation Mother     Hypertension Mother     No Known Problems Father        Social History     Tobacco Use    Smoking status: Former Smoker     Packs/day: 0 20     Quit date: 2018     Years since quittin 8    Smokeless tobacco: Former User   Vaping Use    Vaping Use: Never used   Substance Use Topics    Alcohol use: No    Drug use: No     Comment: [facility]       Objective:    Vitals:    21 1502   BP: 132/68   Pulse: 84   Weight: 96 2 kg (212 lb)   Height: 5' 11" (1 803 m)        Physical Exam  Vitals and nursing note reviewed  Constitutional:       General: He is not in acute distress  Appearance: He is well-developed  He is not diaphoretic  HENT:      Head: Normocephalic and atraumatic  Right Ear: Hearing, tympanic membrane, ear canal and external ear normal       Left Ear: Hearing, tympanic membrane, ear canal and external ear normal       Nose: Nose normal       Right Sinus: No maxillary sinus tenderness or frontal sinus tenderness  Left Sinus: No maxillary sinus tenderness or frontal sinus tenderness  Mouth/Throat:      Pharynx: Uvula midline  No oropharyngeal exudate  Eyes:      General: Lids are everted, no foreign bodies appreciated  Conjunctiva/sclera: Conjunctivae normal       Pupils: Pupils are equal, round, and reactive to light  Neck:      Thyroid: No thyromegaly  Vascular: No carotid bruit  Trachea: No tracheal deviation  Cardiovascular:      Rate and Rhythm: Normal rate  Rhythm irregular  Pulses:           Carotid pulses are 2+ on the right side and 2+ on the left side  Heart sounds: Normal heart sounds  No murmur heard  No friction rub  No gallop  Pulmonary:      Effort: Pulmonary effort is normal  No respiratory distress  Breath sounds: Normal breath sounds  No wheezing or rales  Abdominal:      General: Bowel sounds are normal  There is no distension  Palpations: Abdomen is soft  Tenderness: There is no abdominal tenderness  Musculoskeletal:      Cervical back: Normal range of motion and neck supple  Lymphadenopathy:      Cervical: No cervical adenopathy  Skin:     General: Skin is warm and dry  Neurological:      Mental Status: He is alert and oriented to person, place, and time  Coordination: Coordination normal    Psychiatric:         Behavior: Behavior normal          Thought Content: Thought content normal          Judgment: Judgment normal            Preop labs/testing available and reviewed: yes    eGFR   Date Value Ref Range Status   2021 87 ml/min/1 73sq m Final     WBC   Date Value Ref Range Status   2021 5 66 4 31 - 10 16 Thousand/uL Final     INR   Date Value Ref Range Status   2021 1 22 (H) 0 84 - 1 19 Final       EKG yes, on file at [facility]  Echo no    Stress test/cath no    PFT/[place] no    Functional capacity: Mowing lawn, Push mower  5-6 Mets   Pick the highest level patient can comfortably perform   4 mets or greater for surgery    RCRI  High Risk surgery? 1 Point  CAD History:         1 Point   MI; Positive Stress Test; CP due to Mi;  Nitrate Usage to control Angina; Pathologic Q wave on EKG  CHF Active:         1 Point   Pulm Edema; Paroxysmal Nocturnal Dyspnea;  Bibasilar Rales (crackles);S3; CHF on CXR  Cerebrovascular Disease (TIA or CVA):     1 Point  DM on Insulin:        1 Point  Serum Creat >2 0 mg/dl:       1 Point          Total Points: 1     Scorin: Class I, Very Low Risk (0 4%)     1: Class II, Low risk (0 9%)     2: Class III Moderate (6 6%)     3: Class IV High (>11%)      TATIANA Risk:  GFR:   eGFR   Date Value Ref Range Status   2021 87 ml/min/1 73sq m Final         For PCP:  If GFR>60, Hold ACE/ARB/Diuretic on the day of surgery, and NSAIDS 10 days before      If GFR<45, Consider PRE and POST op Nephrology Consult  If 46 <GFR> 59 : Has Patient had TATIANA in last 6 Months? no   If YES: Preop Nephrology consult   If No:  [place] [address] Nephrology consult  Assessment/Plan:    Patient is medically optimized (cleared) for the planned procedure  Further testing/evaluation is not required  Postop concerns: no    Problem List Items Addressed This Visit     Atrial fibrillation ([facility] )     A continue with the Eliquis  Hold 3 days prior to surgery  As far as digoxin is concerned, he should take that per anesthesia guidelines  Metoprolol should be continued the morning of surgery as well  Cerebrovascular accident (CVA) due to embolism of precerebral artery ([facility] )     No significant persistent problems at this point  Follow-up in the future  Restart Eliquis as soon as practical after surgery  Chronic hepatitis C virus infection ([facility] )     Follows with [facility]  Has been doing quite well  LFTs normal          Esophageal reflux     Stable  Currently on omeprazole  No changes  Should take medication up to the morning of surgery  Day of surgery: directions per Anesthesia         Herniation of intervertebral disc at C5-C6 level     Patient represents low risk for the planned surgery  Hyperlipidemia     Stable  No change  Hypertension     Patient's blood pressure today was quite good  Because of the surgery that is coming up, would recommend that he hold the lisinopril prior surgery  This should be held approximately 3 days  This is to reduce the chances of acute kidney injury  Thrombocytopenia (HCC)     Platelets are at 237  This is minor decreased period is stable  No changes  Does represent a minor increase in bleeding risk  Type 2 diabetes mellitus (HCC)       Lab Results   Component Value Date    HGBA1C 6 9 (H) 02/11/2021   Patient's A1c is at 6 9, which is quite reasonable    I would recommend that he hold the metformin prior to surgery, approximately 3 days  This is because of the potential for needing IV dye in the hospital   Other than this, continue without any incident  Should restart as soon as practical after surgery  Other Visit Diagnoses     Pre-op examination    -  Primary    Patient's physical exam today was normal   Low risk for planned surgery  Diagnoses and all orders for this visit:    Pre-op examination  Comments:  Patient's physical exam today was normal   Low risk for planned surgery      Herniation of intervertebral disc at C5-C6 level    Paroxysmal atrial fibrillation (HCC)    Cerebrovascular accident (CVA) due to embolism of precerebral artery (HCC)    Chronic hepatitis C without hepatic coma ([facility] )    Mixed hyperlipidemia    Essential hypertension    Type 2 diabetes mellitus with chronic kidney disease, without long-term current use of insulin, unspecified CKD stage (HCC)    Thrombocytopenia (HCC)    Gastroesophageal reflux disease without esophagitis          Pre-Surgery Instructions:   Medication Instructions    apixaban (Eliquis) 5 mg Stop taking 3 days prior to surgery    ciclopirox (PENLAC) 8 % solution per anesthesia guidelines     clotrimazole (LOTRIMIN) 1 % cream per anesthesia guidelines     digoxin (LANOXIN) 0 25 mg tablet per anesthesia guidelines     glucose blood (ONE TOUCH ULTRA TEST) test strip per anesthesia guidelines     Lancets (ONETOUCH ULTRASOFT) lancets per anesthesia guidelines     lisinopril (ZESTRIL) 5 mg tablet Stop taking 3 days prior to surgery    metFORMIN (GLUCOPHAGE) 500 mg tablet Stop taking 3 days prior to surgery    metoprolol tartrate (LOPRESSOR) 25 mg tablet Take morning of surgery    Milk Thistle 250 MG CAPS Stop taking 3 days prior to surgery    omeprazole (PriLOSEC) 20 mg delayed release capsule per anesthesia guidelines     tadalafil (CIALIS) 10 MG tablet Stop taking 3 days prior to surgery        NOTE: Please use the above to review important meds for your specialty, the remainder "per anesthesia Guidelines "    NOTE: Please place an Inbasket message for "[facility]" pool for complicated patients

## 2021-06-30 NOTE — ASSESSMENT & PLAN NOTE
A continue with the Eliquis  Hold 3 days prior to surgery  As far as digoxin is concerned, he should take that per anesthesia guidelines  Metoprolol should be continued the morning of surgery as well

## 2021-06-30 NOTE — ASSESSMENT & PLAN NOTE
Lab Results   Component Value Date    HGBA1C 6 9 (H) 02/11/2021   Patient's A1c is at 6 9, which is quite reasonable  I would recommend that he hold the metformin prior to surgery, approximately 3 days  This is because of the potential for needing IV dye in the hospital   Other than this, continue without any incident  Should restart as soon as practical after surgery

## 2021-06-30 NOTE — ASSESSMENT & PLAN NOTE
Platelets are at 180  This is minor decreased period is stable  No changes  Does represent a minor increase in bleeding risk

## 2021-06-30 NOTE — ASSESSMENT & PLAN NOTE
No significant persistent problems at this point  Follow-up in the future  Restart Eliquis as soon as practical after surgery

## 2021-07-01 ENCOUNTER — HOSPITAL ENCOUNTER (OUTPATIENT)
Dept: MRI IMAGING | Facility: HOSPITAL | Age: 56
Discharge: HOME | End: 2021-07-01
Payer: COMMERCIAL

## 2021-07-01 DIAGNOSIS — M54.12 CERVICAL RADICULOPATHY: ICD-10-CM

## 2021-07-01 DIAGNOSIS — I48.20 CHRONIC ATRIAL FIBRILLATION (HCC): ICD-10-CM

## 2021-07-01 DIAGNOSIS — I63.10 CEREBROVASCULAR ACCIDENT (CVA) DUE TO EMBOLISM OF PRECEREBRAL ARTERY (HCC): ICD-10-CM

## 2021-07-01 DIAGNOSIS — M50.120 CERVICAL DISC DISORDER WITH RADICULOPATHY OF MID-CERVICAL REGION: ICD-10-CM

## 2021-07-01 DIAGNOSIS — E11.22 TYPE 2 DIABETES MELLITUS WITH CHRONIC KIDNEY DISEASE, WITHOUT LONG-TERM CURRENT USE OF INSULIN, UNSPECIFIED CKD STAGE (HCC): ICD-10-CM

## 2021-07-01 DIAGNOSIS — M54.2 NECK PAIN: ICD-10-CM

## 2021-07-01 PROCEDURE — G1004 CDSM NDSC: HCPCS

## 2021-07-01 PROCEDURE — 72141 MRI NECK SPINE W/O DYE: CPT

## 2021-07-22 DIAGNOSIS — R80.9 MICROALBUMINURIA: ICD-10-CM

## 2021-07-22 DIAGNOSIS — E11.29 CONTROLLED TYPE 2 DIABETES MELLITUS WITH MICROALBUMINURIA, WITHOUT LONG-TERM CURRENT USE OF INSULIN (HCC): ICD-10-CM

## 2021-07-22 DIAGNOSIS — I48.21 PERMANENT ATRIAL FIBRILLATION (HCC): ICD-10-CM

## 2021-07-22 DIAGNOSIS — E11.9 TYPE 2 DIABETES MELLITUS WITHOUT COMPLICATION, UNSPECIFIED WHETHER LONG TERM INSULIN USE (HCC): ICD-10-CM

## 2021-07-22 DIAGNOSIS — R80.9 CONTROLLED TYPE 2 DIABETES MELLITUS WITH MICROALBUMINURIA, WITHOUT LONG-TERM CURRENT USE OF INSULIN (HCC): ICD-10-CM

## 2021-07-22 DIAGNOSIS — I63.519 ACUTE ISCHEMIC CEREBROVASCULAR ACCIDENT (CVA) INVOLVING MIDDLE CEREBRAL ARTERY TERRITORY (HCC): ICD-10-CM

## 2021-07-22 PROCEDURE — 3066F NEPHROPATHY DOC TX: CPT | Performed by: PHYSICIAN ASSISTANT

## 2021-07-22 PROCEDURE — 4010F ACE/ARB THERAPY RXD/TAKEN: CPT | Performed by: FAMILY MEDICINE

## 2021-07-22 RX ORDER — LISINOPRIL 5 MG/1
5 TABLET ORAL DAILY
Qty: 90 TABLET | Refills: 1 | Status: SHIPPED | OUTPATIENT
Start: 2021-07-22 | End: 2021-09-14 | Stop reason: SDUPTHER

## 2021-08-19 NOTE — MISCELLANEOUS
Provider Comments  Provider Comments:   Patient did not show for scheduled appointment today      Signatures   Electronically signed by : Roel Junior DO; Oct 31 2017  4:15PM EST                       (Author) balance training/bed mobility training/gait training/transfer training

## 2021-08-25 ENCOUNTER — VBI (OUTPATIENT)
Dept: ADMINISTRATIVE | Facility: OTHER | Age: 56
End: 2021-08-25

## 2021-09-08 DIAGNOSIS — E11.9 TYPE 2 DIABETES MELLITUS WITHOUT COMPLICATION, UNSPECIFIED WHETHER LONG TERM INSULIN USE (HCC): ICD-10-CM

## 2021-09-08 DIAGNOSIS — R80.9 MICROALBUMINURIA: ICD-10-CM

## 2021-09-08 DIAGNOSIS — I48.21 PERMANENT ATRIAL FIBRILLATION (HCC): ICD-10-CM

## 2021-09-08 DIAGNOSIS — I63.519 ACUTE ISCHEMIC CEREBROVASCULAR ACCIDENT (CVA) INVOLVING MIDDLE CEREBRAL ARTERY TERRITORY (HCC): ICD-10-CM

## 2021-09-08 DIAGNOSIS — E11.29 CONTROLLED TYPE 2 DIABETES MELLITUS WITH MICROALBUMINURIA, WITHOUT LONG-TERM CURRENT USE OF INSULIN (HCC): ICD-10-CM

## 2021-09-08 DIAGNOSIS — R80.9 CONTROLLED TYPE 2 DIABETES MELLITUS WITH MICROALBUMINURIA, WITHOUT LONG-TERM CURRENT USE OF INSULIN (HCC): ICD-10-CM

## 2021-09-09 RX ORDER — LISINOPRIL 5 MG/1
5 TABLET ORAL DAILY
Qty: 90 TABLET | Refills: 1 | OUTPATIENT
Start: 2021-09-09

## 2021-09-09 NOTE — TELEPHONE ENCOUNTER
The prescription for lisinopril was sent to the pharmacy in July for 90 with a refill  He should not need another prescription at this time

## 2021-09-14 ENCOUNTER — TELEPHONE (OUTPATIENT)
Dept: FAMILY MEDICINE CLINIC | Facility: CLINIC | Age: 56
End: 2021-09-14

## 2021-09-14 DIAGNOSIS — E11.9 TYPE 2 DIABETES MELLITUS WITHOUT COMPLICATION, UNSPECIFIED WHETHER LONG TERM INSULIN USE (HCC): ICD-10-CM

## 2021-09-14 DIAGNOSIS — R80.9 MICROALBUMINURIA: ICD-10-CM

## 2021-09-14 DIAGNOSIS — I63.519 ACUTE ISCHEMIC CEREBROVASCULAR ACCIDENT (CVA) INVOLVING MIDDLE CEREBRAL ARTERY TERRITORY (HCC): ICD-10-CM

## 2021-09-14 DIAGNOSIS — E11.29 CONTROLLED TYPE 2 DIABETES MELLITUS WITH MICROALBUMINURIA, WITHOUT LONG-TERM CURRENT USE OF INSULIN (HCC): ICD-10-CM

## 2021-09-14 DIAGNOSIS — R80.9 CONTROLLED TYPE 2 DIABETES MELLITUS WITH MICROALBUMINURIA, WITHOUT LONG-TERM CURRENT USE OF INSULIN (HCC): ICD-10-CM

## 2021-09-14 DIAGNOSIS — I48.21 PERMANENT ATRIAL FIBRILLATION (HCC): ICD-10-CM

## 2021-09-14 NOTE — TELEPHONE ENCOUNTER
PT CALLED, IS OUT OF HIS LISINOPRIL AND NEEDS US TO CALL [facility] WITH A NEW SCRIPT, THEY TOLD HIM HE IS OUT OF REFILLS AND NEEDS OUR OFFICE TO SEND THEN A NEW SCRIPT  HE SAID HE CALLED EARLIER AND ORDERED A SHORT SCRIPT TO THE [facility] FOR HIS LISINOPRIL AND THAT HE WILL  TODAY  BUT AGAIN ASKING IF WE COULD SEND A NEW SCRIPT TO HIS [facility] SO HE CONTINUES GETTING HIS MEDS    ANY QUESTIONS CALL PT [phone]

## 2021-09-14 NOTE — TELEPHONE ENCOUNTER
PATIENT STATES HE NEVER RECEIVED 90 DAY SUPPLY FROM [facility] AND HE IS NOW OUT OF HIS MEDICATION  CAN WE CALL SOME IN TO HIS LOCAL PHARMACY TO KEEP HIM UNTIL THEY COME? [facility] [place] [facility] [place] [facility]    PATIENT WILL CALL [facility] TO FIGURE OUT WHAT HAPPENED

## 2021-09-14 NOTE — TELEPHONE ENCOUNTER
Pt states he asked for [facility] until we send [facility] to [facility] and he receive's it

## 2021-09-15 RX ORDER — LISINOPRIL 5 MG/1
5 TABLET ORAL DAILY
Qty: 30 TABLET | Refills: 0 | Status: SHIPPED | OUTPATIENT
Start: 2021-09-15 | End: 2022-01-17

## 2021-09-15 RX ORDER — LISINOPRIL 5 MG/1
5 TABLET ORAL DAILY
Qty: 90 TABLET | Refills: 1 | Status: SHIPPED | OUTPATIENT
Start: 2021-09-15 | End: 2022-02-24

## 2021-10-07 ENCOUNTER — APPOINTMENT (OUTPATIENT)
Dept: LAB | Facility: MEDICAL CENTER | Age: 56
End: 2021-10-07
Payer: COMMERCIAL

## 2021-10-07 DIAGNOSIS — K74.60 CIRRHOSIS OF LIVER WITHOUT ASCITES, UNSPECIFIED HEPATIC CIRRHOSIS TYPE (HCC): ICD-10-CM

## 2021-10-07 LAB
AFP-TM SERPL-MCNC: 4.5 NG/ML (ref 0.5–8)
ALBUMIN SERPL BCP-MCNC: 3.8 G/DL (ref 3.5–5)
ALP SERPL-CCNC: 62 U/L (ref 46–116)
ALT SERPL W P-5'-P-CCNC: 34 U/L (ref 12–78)
ANION GAP SERPL CALCULATED.3IONS-SCNC: 6 MMOL/L (ref 4–13)
AST SERPL W P-5'-P-CCNC: 22 U/L (ref 5–45)
BILIRUB SERPL-MCNC: 1.24 MG/DL (ref 0.2–1)
BUN SERPL-MCNC: 14 MG/DL (ref 5–25)
CALCIUM SERPL-MCNC: 9.3 MG/DL (ref 8.3–10.1)
CHLORIDE SERPL-SCNC: 103 MMOL/L (ref 100–108)
CO2 SERPL-SCNC: 29 MMOL/L (ref 21–32)
CREAT SERPL-MCNC: 1 MG/DL (ref 0.6–1.3)
ERYTHROCYTE [DISTWIDTH] IN BLOOD BY AUTOMATED COUNT: 15.4 % (ref 11.6–15.1)
GFR SERPL CREATININE-BSD FRML MDRD: 83 ML/MIN/1.73SQ M
GLUCOSE P FAST SERPL-MCNC: 213 MG/DL (ref 65–99)
HCT VFR BLD AUTO: 40.1 % (ref 36.5–49.3)
HGB BLD-MCNC: 13.6 G/DL (ref 12–17)
INR PPP: 1.25 (ref 0.84–1.19)
MCH RBC QN AUTO: 31.5 PG (ref 26.8–34.3)
MCHC RBC AUTO-ENTMCNC: 33.9 G/DL (ref 31.4–37.4)
MCV RBC AUTO: 93 FL (ref 82–98)
PLATELET # BLD AUTO: 123 THOUSANDS/UL (ref 149–390)
PMV BLD AUTO: 10.6 FL (ref 8.9–12.7)
POTASSIUM SERPL-SCNC: 4 MMOL/L (ref 3.5–5.3)
PROT SERPL-MCNC: 6.9 G/DL (ref 6.4–8.2)
PROTHROMBIN TIME: 15.2 SECONDS (ref 11.6–14.5)
RBC # BLD AUTO: 4.32 MILLION/UL (ref 3.88–5.62)
SODIUM SERPL-SCNC: 138 MMOL/L (ref 136–145)
WBC # BLD AUTO: 5.56 THOUSAND/UL (ref 4.31–10.16)

## 2021-10-07 PROCEDURE — 85027 COMPLETE CBC AUTOMATED: CPT

## 2021-10-07 PROCEDURE — 82105 ALPHA-FETOPROTEIN SERUM: CPT

## 2021-10-07 PROCEDURE — 80053 COMPREHEN METABOLIC PANEL: CPT

## 2021-10-07 PROCEDURE — 85610 PROTHROMBIN TIME: CPT

## 2021-10-07 PROCEDURE — 36415 COLL VENOUS BLD VENIPUNCTURE: CPT

## 2021-10-27 ENCOUNTER — TRANSCRIBE ORDERS (OUTPATIENT)
Dept: FAMILY MEDICINE CLINIC | Facility: CLINIC | Age: 56
End: 2021-10-27

## 2021-10-27 DIAGNOSIS — M54.10 RADICULOPATHY, UNSPECIFIED SPINAL REGION: Primary | ICD-10-CM

## 2021-10-27 DIAGNOSIS — M50.120 MID-CERVICAL DISC DISORDER, UNSPECIFIED LEVEL: ICD-10-CM

## 2021-10-27 DIAGNOSIS — M54.2 CERVICALGIA: ICD-10-CM

## 2021-11-05 ENCOUNTER — VBI (OUTPATIENT)
Dept: ADMINISTRATIVE | Facility: OTHER | Age: 56
End: 2021-11-05

## 2021-12-03 ENCOUNTER — VBI (OUTPATIENT)
Dept: ADMINISTRATIVE | Facility: OTHER | Age: 56
End: 2021-12-03

## 2022-01-10 ENCOUNTER — VBI (OUTPATIENT)
Dept: ADMINISTRATIVE | Facility: OTHER | Age: 57
End: 2022-01-10

## 2022-01-12 DIAGNOSIS — R80.9 CONTROLLED TYPE 2 DIABETES MELLITUS WITH MICROALBUMINURIA, WITHOUT LONG-TERM CURRENT USE OF INSULIN (HCC): ICD-10-CM

## 2022-01-12 DIAGNOSIS — E11.9 TYPE 2 DIABETES MELLITUS WITHOUT COMPLICATION, UNSPECIFIED WHETHER LONG TERM INSULIN USE (HCC): ICD-10-CM

## 2022-01-12 DIAGNOSIS — I63.519 ACUTE ISCHEMIC CEREBROVASCULAR ACCIDENT (CVA) INVOLVING MIDDLE CEREBRAL ARTERY TERRITORY (HCC): ICD-10-CM

## 2022-01-12 DIAGNOSIS — I48.21 PERMANENT ATRIAL FIBRILLATION (HCC): ICD-10-CM

## 2022-01-12 DIAGNOSIS — E11.29 CONTROLLED TYPE 2 DIABETES MELLITUS WITH MICROALBUMINURIA, WITHOUT LONG-TERM CURRENT USE OF INSULIN (HCC): ICD-10-CM

## 2022-01-12 PROCEDURE — 3066F NEPHROPATHY DOC TX: CPT | Performed by: FAMILY MEDICINE

## 2022-01-17 ENCOUNTER — OFFICE VISIT (OUTPATIENT)
Dept: FAMILY MEDICINE CLINIC | Facility: CLINIC | Age: 57
End: 2022-01-17
Payer: COMMERCIAL

## 2022-01-17 VITALS
HEART RATE: 96 BPM | WEIGHT: 214 LBS | HEIGHT: 71 IN | DIASTOLIC BLOOD PRESSURE: 84 MMHG | BODY MASS INDEX: 29.96 KG/M2 | SYSTOLIC BLOOD PRESSURE: 128 MMHG

## 2022-01-17 DIAGNOSIS — B18.2 CHRONIC HEPATITIS C WITHOUT HEPATIC COMA (HCC): ICD-10-CM

## 2022-01-17 DIAGNOSIS — E78.2 MIXED HYPERLIPIDEMIA: ICD-10-CM

## 2022-01-17 DIAGNOSIS — N52.1 ERECTILE DYSFUNCTION DUE TO DISEASES CLASSIFIED ELSEWHERE: ICD-10-CM

## 2022-01-17 DIAGNOSIS — I10 PRIMARY HYPERTENSION: ICD-10-CM

## 2022-01-17 DIAGNOSIS — Z11.4 SCREENING FOR HIV (HUMAN IMMUNODEFICIENCY VIRUS): ICD-10-CM

## 2022-01-17 DIAGNOSIS — E11.22 TYPE 2 DIABETES MELLITUS WITH CHRONIC KIDNEY DISEASE, WITHOUT LONG-TERM CURRENT USE OF INSULIN, UNSPECIFIED CKD STAGE (HCC): Primary | ICD-10-CM

## 2022-01-17 DIAGNOSIS — I48.0 PAROXYSMAL ATRIAL FIBRILLATION (HCC): ICD-10-CM

## 2022-01-17 DIAGNOSIS — R80.9 MICROALBUMINURIA: ICD-10-CM

## 2022-01-17 DIAGNOSIS — D69.6 THROMBOCYTOPENIA (HCC): ICD-10-CM

## 2022-01-17 DIAGNOSIS — I63.10 CEREBROVASCULAR ACCIDENT (CVA) DUE TO EMBOLISM OF PRECEREBRAL ARTERY (HCC): ICD-10-CM

## 2022-01-17 DIAGNOSIS — Z72.0 TOBACCO ABUSE: ICD-10-CM

## 2022-01-17 DIAGNOSIS — K74.60 CIRRHOSIS OF LIVER WITHOUT ASCITES, UNSPECIFIED HEPATIC CIRRHOSIS TYPE (HCC): ICD-10-CM

## 2022-01-17 DIAGNOSIS — R35.1 NOCTURIA: ICD-10-CM

## 2022-01-17 PROBLEM — L95.9 VASCULITIS OF SKIN: Status: RESOLVED | Noted: 2018-06-19 | Resolved: 2022-01-17

## 2022-01-17 PROCEDURE — 3725F SCREEN DEPRESSION PERFORMED: CPT | Performed by: FAMILY MEDICINE

## 2022-01-17 PROCEDURE — 3008F BODY MASS INDEX DOCD: CPT | Performed by: FAMILY MEDICINE

## 2022-01-17 PROCEDURE — 92250 FUNDUS PHOTOGRAPHY W/I&R: CPT | Performed by: FAMILY MEDICINE

## 2022-01-17 PROCEDURE — 1036F TOBACCO NON-USER: CPT | Performed by: FAMILY MEDICINE

## 2022-01-17 PROCEDURE — 3074F SYST BP LT 130 MM HG: CPT | Performed by: FAMILY MEDICINE

## 2022-01-17 PROCEDURE — 3079F DIAST BP 80-89 MM HG: CPT | Performed by: FAMILY MEDICINE

## 2022-01-17 PROCEDURE — 99214 OFFICE O/P EST MOD 30 MIN: CPT | Performed by: FAMILY MEDICINE

## 2022-01-17 RX ORDER — SILDENAFIL 50 MG/1
50 TABLET, FILM COATED ORAL DAILY PRN
Qty: 10 TABLET | Refills: 3 | Status: SHIPPED | OUTPATIENT
Start: 2022-01-17

## 2022-01-17 NOTE — ASSESSMENT & PLAN NOTE
Lab Results   Component Value Date    HGBA1C 8 0 (H) 07/22/2021   Follows with Endocrinology, diabetic foot and eye exams completed in office today

## 2022-01-17 NOTE — PROGRESS NOTES
Assessment and Plan:  1  Type 2 diabetes with microalbuminuria, follows with endocrinology is on Ace diabetic foot and retinal exams completed in office today  2  Chronic hep C/cirrhosis, blood work ordered follows with Gastroenterology  3  Paroxysmal AFib/anticoagulant therapy, follows with Cardiology  4  CVA, stable follows with Neurology  5  Hypertension, stable continue present therapy  6  Nocturia, PSA is ordered  7  Thrombocytopenia, blood work is ordered  8  Tobacco abuse, complete cessation recommended patient declines CT screening  9  Hyperlipidemia patient is off atorvastatin blood work is ordered  10  Erectile dysfunction, Viagra was reordered  6  Healthcare maintenance, HIV screening ordered  12   Patient to return in 6 months for office visit will see sooner if needed     Problem List Items Addressed This Visit        Digestive    Chronic hepatitis C virus infection ([facility] )     Blood work ordered patient follow-up Gastroenterology         Relevant Orders    CBC    Comprehensive metabolic panel    Lipid Panel with Direct LDL reflex    Microalbumin / creatinine urine ratio    TSH, 3rd generation with Free T4 reflex    PSA Total, Diagnostic    Cirrhosis of liver ([facility] )     Follow-up Gastroenterology         Relevant Orders    CBC    Comprehensive metabolic panel    Lipid Panel with Direct LDL reflex    Microalbumin / creatinine urine ratio    TSH, 3rd generation with Free T4 reflex    PSA Total, Diagnostic       Endocrine    Type 2 diabetes mellitus ([facility] ) - Primary       Lab Results   Component Value Date    HGBA1C 8 0 (H) 07/22/2021   Follows with Endocrinology, diabetic foot and eye exams completed in office today         Relevant Orders    IRIS Diabetic eye exam    CBC    Comprehensive metabolic panel    Lipid Panel with Direct LDL reflex    Microalbumin / creatinine urine ratio    TSH, 3rd generation with Free T4 reflex    PSA Total, Diagnostic       Cardiovascular and Mediastinum    Atrial fibrillation ([facility] )     Stable follows with Cardiology         Relevant Medications    sildenafil (VIAGRA) 50 MG tablet    Other Relevant Orders    CBC    Comprehensive metabolic panel    Lipid Panel with Direct LDL reflex    Microalbumin / creatinine urine ratio    TSH, 3rd generation with Free T4 reflex    PSA Total, Diagnostic    Cerebrovascular accident (CVA) due to embolism of precerebral artery (HCC)     Stable to follow-up neurology         Relevant Orders    CBC    Comprehensive metabolic panel    Lipid Panel with Direct LDL reflex    Microalbumin / creatinine urine ratio    TSH, 3rd generation with Free T4 reflex    PSA Total, Diagnostic    Hypertension     Stable continue present therapy         Relevant Orders    CBC    Comprehensive metabolic panel    Lipid Panel with Direct LDL reflex    Microalbumin / creatinine urine ratio    TSH, 3rd generation with Free T4 reflex    PSA Total, Diagnostic       Other    Erectile dysfunction     Viagra reordered         Relevant Medications    sildenafil (VIAGRA) 50 MG tablet    Other Relevant Orders    CBC    Comprehensive metabolic panel    Lipid Panel with Direct LDL reflex    Microalbumin / creatinine urine ratio    TSH, 3rd generation with Free T4 reflex    PSA Total, Diagnostic    Microalbuminuria     Blood work ordered         Relevant Orders    CBC    Comprehensive metabolic panel    Lipid Panel with Direct LDL reflex    Microalbumin / creatinine urine ratio    TSH, 3rd generation with Free T4 reflex    PSA Total, Diagnostic    Nocturia    Relevant Orders    CBC    Comprehensive metabolic panel    Lipid Panel with Direct LDL reflex    Microalbumin / creatinine urine ratio    TSH, 3rd generation with Free T4 reflex    PSA Total, Diagnostic    Thrombocytopenia (HCC)     Blood work ordered         Relevant Orders    CBC    Comprehensive metabolic panel    Lipid Panel with Direct LDL reflex    Microalbumin / creatinine urine ratio    TSH, 3rd generation with Free T4 reflex PSA Total, Diagnostic    Tobacco abuse     Complete cessation recommended patient declines CT screening         Relevant Orders    CBC    Comprehensive metabolic panel    Lipid Panel with Direct LDL reflex    Microalbumin / creatinine urine ratio    TSH, 3rd generation with Free T4 reflex    PSA Total, Diagnostic    Hyperlipidemia     Blood work ordered         Relevant Orders    CBC    Comprehensive metabolic panel    Lipid Panel with Direct LDL reflex    Microalbumin / creatinine urine ratio    TSH, 3rd generation with Free T4 reflex    PSA Total, Diagnostic      Other Visit Diagnoses     Screening for HIV (human immunodeficiency virus)        Relevant Orders    HIV 1/2 Antigen/Antibody (4th Generation) w Reflex [facility]                 Diagnoses and all orders for this visit:    Type 2 diabetes mellitus with chronic kidney disease, without long-term current use of insulin, unspecified CKD stage (HCC)  -     IRIS Diabetic eye exam  -     CBC; Future  -     Comprehensive metabolic panel; Future  -     Lipid Panel with Direct LDL reflex; Future  -     Microalbumin / creatinine urine ratio; Future  -     TSH, 3rd generation with Free T4 reflex; Future  -     PSA Total, Diagnostic; Future    Chronic hepatitis C without hepatic coma (HCC)  -     CBC; Future  -     Comprehensive metabolic panel; Future  -     Lipid Panel with Direct LDL reflex; Future  -     Microalbumin / creatinine urine ratio; Future  -     TSH, 3rd generation with Free T4 reflex; Future  -     PSA Total, Diagnostic; Future    Paroxysmal atrial fibrillation (HCC)  -     CBC; Future  -     Comprehensive metabolic panel; Future  -     Lipid Panel with Direct LDL reflex; Future  -     Microalbumin / creatinine urine ratio; Future  -     TSH, 3rd generation with Free T4 reflex; Future  -     PSA Total, Diagnostic; Future    Cerebrovascular accident (CVA) due to embolism of precerebral artery (HCC)  -     CBC;  Future  -     Comprehensive metabolic panel; Future  -     Lipid Panel with Direct LDL reflex; Future  -     Microalbumin / creatinine urine ratio; Future  -     TSH, 3rd generation with Free T4 reflex; Future  -     PSA Total, Diagnostic; Future    Primary hypertension  -     CBC; Future  -     Comprehensive metabolic panel; Future  -     Lipid Panel with Direct LDL reflex; Future  -     Microalbumin / creatinine urine ratio; Future  -     TSH, 3rd generation with Free T4 reflex; Future  -     PSA Total, Diagnostic; Future    Microalbuminuria  -     CBC; Future  -     Comprehensive metabolic panel; Future  -     Lipid Panel with Direct LDL reflex; Future  -     Microalbumin / creatinine urine ratio; Future  -     TSH, 3rd generation with Free T4 reflex; Future  -     PSA Total, Diagnostic; Future    Nocturia  -     CBC; Future  -     Comprehensive metabolic panel; Future  -     Lipid Panel with Direct LDL reflex; Future  -     Microalbumin / creatinine urine ratio; Future  -     TSH, 3rd generation with Free T4 reflex; Future  -     PSA Total, Diagnostic; Future    Thrombocytopenia (HCC)  -     CBC; Future  -     Comprehensive metabolic panel; Future  -     Lipid Panel with Direct LDL reflex; Future  -     Microalbumin / creatinine urine ratio; Future  -     TSH, 3rd generation with Free T4 reflex; Future  -     PSA Total, Diagnostic; Future    Tobacco abuse  -     CBC; Future  -     Comprehensive metabolic panel; Future  -     Lipid Panel with Direct LDL reflex; Future  -     Microalbumin / creatinine urine ratio; Future  -     TSH, 3rd generation with Free T4 reflex; Future  -     PSA Total, Diagnostic; Future    Mixed hyperlipidemia  -     CBC; Future  -     Comprehensive metabolic panel; Future  -     Lipid Panel with Direct LDL reflex; Future  -     Microalbumin / creatinine urine ratio; Future  -     TSH, 3rd generation with Free T4 reflex; Future  -     PSA Total, Diagnostic;  Future    Erectile dysfunction due to diseases classified elsewhere  - sildenafil (VIAGRA) 50 MG tablet; Take 1 tablet (50 mg total) by mouth daily as needed for erectile dysfunction  -     CBC; Future  -     Comprehensive metabolic panel; Future  -     Lipid Panel with Direct LDL reflex; Future  -     Microalbumin / creatinine urine ratio; Future  -     TSH, 3rd generation with Free T4 reflex; Future  -     PSA Total, Diagnostic; Future    Cirrhosis of liver without ascites, unspecified hepatic cirrhosis type ([facility] )  -     CBC; Future  -     Comprehensive metabolic panel; Future  -     Lipid Panel with Direct LDL reflex; Future  -     Microalbumin / creatinine urine ratio; Future  -     TSH, 3rd generation with Free T4 reflex; Future  -     PSA Total, Diagnostic; Future    Screening for HIV (human immunodeficiency virus)  -     HIV 1/2 Antigen/Antibody (4th Generation) w Reflex [facility]; Future              Subjective:      Patient ID: Moses Werner is a 61 y o  male  CC:    Chief Complaint   Patient presents with    Follow-up       HPI:    Patient states he is here for routine follow-up and needs refills Viagra  Patient does see endocrinology for his diabetes  Has not had a diabetic eye exam a foot exam this year will complete today  Also will order blood work  The following portions of the patient's history were reviewed and updated as appropriate: allergies, current medications, past family history, past medical history, past social history, past surgical history and problem list       Review of Systems   Constitutional: Negative  HENT: Negative  Eyes: Negative  Respiratory: Negative  Cardiovascular: Negative  Gastrointestinal: Negative  Endocrine:        HPI   Genitourinary:        HPI   Musculoskeletal: Negative  Skin: Negative  Allergic/Immunologic: Negative  Neurological: Negative  Hematological: Negative  Psychiatric/Behavioral: Negative            Data to review:       Objective:    Vitals:    01/17/22 1627   BP: 128/84   Pulse: 96 Weight: 97 1 kg (214 lb)   Height: 5' 11" (1 803 m)        Physical Exam  Vitals and nursing note reviewed  Constitutional:       Appearance: Normal appearance  HENT:      Head: Normocephalic and atraumatic  Eyes:      General: No scleral icterus  Neck:      Vascular: No carotid bruit  Cardiovascular:      Rate and Rhythm: Normal rate and regular rhythm  Pulses: Normal pulses  no weak pulses          Dorsalis pedis pulses are 2+ on the right side and 2+ on the left side  Posterior tibial pulses are 2+ on the right side and 2+ on the left side  Heart sounds: Normal heart sounds  Pulmonary:      Effort: Pulmonary effort is normal       Breath sounds: Normal breath sounds  Abdominal:      General: Bowel sounds are normal       Palpations: Abdomen is soft  Tenderness: There is no abdominal tenderness  Musculoskeletal:      Cervical back: Neck supple  Right lower leg: No edema  Left lower leg: No edema  Feet:      Right foot:      Skin integrity: No ulcer, skin breakdown, erythema, warmth, callus or dry skin  Left foot:      Skin integrity: No ulcer, skin breakdown, erythema, warmth, callus or dry skin  Skin:     General: Skin is warm and dry  Neurological:      General: No focal deficit present  Mental Status: He is alert  Psychiatric:         Mood and Affect: Mood normal         BMI Counseling: Body mass index is 29 85 kg/m²  The BMI is above normal  Nutrition recommendations include moderation in carbohydrate intake and reducing intake of cholesterol  Exercise recommendations include exercising 3-5 times per week  Rationale for BMI follow-up plan is due to patient being overweight or obese  Depression Screening and Follow-up Plan: Patient was screened for depression during today's encounter  They screened negative with a PHQ-2 score of 0  Patient's shoes and socks removed      Right Foot/Ankle   Right Foot Inspection  Skin Exam: skin normal and skin intact  No dry skin, no warmth, no callus, no erythema, no maceration, no abnormal color, no pre-ulcer, no ulcer and no callus  Toe Exam: ROM and strength within normal limits  Sensory   Vibration: intact  Proprioception: intact  Monofilament testing: intact    Vascular  Capillary refills: < 3 seconds  The right DP pulse is 2+  The right PT pulse is 2+  Right Toe  - Comprehensive Exam  Arch: normal  Hammertoes: absent  Claw Toes: absent  Swelling: none   Tenderness: none         Left Foot/Ankle  Left Foot Inspection  Skin Exam: skin normal and skin intact  No dry skin, no warmth, no erythema, no maceration, normal color, no pre-ulcer, no ulcer and no callus  Toe Exam: ROM and strength within normal limits  Sensory   Vibration: intact  Proprioception: intact  Monofilament testing: intact    Vascular  Capillary refills: < 3 seconds  The left DP pulse is 2+  The left PT pulse is 2+       Left Toe  - Comprehensive Exam  Arch: normal  Hammertoes: absent  Claw toes: absent  Swelling: none   Tenderness: none           Assign Risk Category  No deformity present  No loss of protective sensation  No weak pulses  Risk: 0

## 2022-01-17 NOTE — PATIENT INSTRUCTIONS
Patient follow all specialist per their instructions  Diet exercise weight loss recommended  Complete blood work as ordered  Return in 6 months sooner if need

## 2022-01-18 LAB
LEFT EYE DIABETIC RETINOPATHY: NORMAL
LEFT EYE IMAGE QUALITY: NORMAL
LEFT EYE MACULAR EDEMA: NORMAL
LEFT EYE OTHER RETINOPATHY: NORMAL
RIGHT EYE DIABETIC RETINOPATHY: NORMAL
RIGHT EYE IMAGE QUALITY: NORMAL
RIGHT EYE MACULAR EDEMA: NORMAL
RIGHT EYE OTHER RETINOPATHY: NORMAL
SEVERITY (EYE EXAM): NORMAL

## 2022-01-18 PROCEDURE — 2025F 7 FLD RTA PHOTO W/O RTNOPTHY: CPT | Performed by: FAMILY MEDICINE

## 2022-01-24 DIAGNOSIS — E11.9 TYPE 2 DIABETES MELLITUS WITHOUT COMPLICATION, UNSPECIFIED WHETHER LONG TERM INSULIN USE (HCC): ICD-10-CM

## 2022-01-24 RX ORDER — OMEPRAZOLE 20 MG/1
CAPSULE, DELAYED RELEASE ORAL
Qty: 90 CAPSULE | Refills: 3 | Status: SHIPPED | OUTPATIENT
Start: 2022-01-24

## 2022-02-20 PROBLEM — E78.6 LOW HDL (UNDER 40): Status: ACTIVE | Noted: 2020-11-05

## 2022-02-22 ENCOUNTER — OFFICE VISIT (OUTPATIENT)
Dept: CARDIOLOGY CLINIC | Facility: CLINIC | Age: 57
End: 2022-02-22
Payer: COMMERCIAL

## 2022-02-22 VITALS
DIASTOLIC BLOOD PRESSURE: 80 MMHG | SYSTOLIC BLOOD PRESSURE: 150 MMHG | WEIGHT: 215 LBS | BODY MASS INDEX: 29.99 KG/M2 | HEART RATE: 80 BPM

## 2022-02-22 DIAGNOSIS — I10 PRIMARY HYPERTENSION: ICD-10-CM

## 2022-02-22 DIAGNOSIS — I45.10 RBBB: ICD-10-CM

## 2022-02-22 DIAGNOSIS — I48.21 PERMANENT ATRIAL FIBRILLATION (HCC): Primary | ICD-10-CM

## 2022-02-22 DIAGNOSIS — E78.6 LOW HDL (UNDER 40): ICD-10-CM

## 2022-02-22 PROCEDURE — 1036F TOBACCO NON-USER: CPT | Performed by: INTERNAL MEDICINE

## 2022-02-22 PROCEDURE — 3079F DIAST BP 80-89 MM HG: CPT | Performed by: INTERNAL MEDICINE

## 2022-02-22 PROCEDURE — 99213 OFFICE O/P EST LOW 20 MIN: CPT | Performed by: INTERNAL MEDICINE

## 2022-02-22 PROCEDURE — 3077F SYST BP >= 140 MM HG: CPT | Performed by: INTERNAL MEDICINE

## 2022-02-22 NOTE — PROGRESS NOTES
Cardiology Follow Up    Fortino   1965  [id number]  [address] [facility] [id number] [phone]    Reason for visit: 1 year follow-up for chronic atrial fibrillation with history of embolic TIA  Also has hypertension and right bundle branch block  1  Permanent atrial fibrillation ([facility] )     2  Primary hypertension     3  RBBB     4  Low HDL (under 40)         Interval History:   Since the patients last visit, he has done well    He denies chest pain, shortness of breath, palpitations, edema or lightheadedness      Patient Active Problem List   Diagnosis    Allergic rhinitis    Permanent atrial fibrillation (HCC)    Chronic hepatitis C virus infection ([place] [address] )    Colon polyp    Type 2 diabetes mellitus (HCC)    Erectile dysfunction    Esophageal reflux    Microalbuminuria    Nocturia    Palpitations    RBBB    Thrombocytopenia (HCC)    Tobacco abuse    Hand dermatitis    Chronic pain of right ankle    Cerebrovascular accident (CVA) due to embolism of precerebral artery (HCC)    Obesity    Cirrhosis of liver (HCC)    Hypertension    Muscle ache    Acute pain of right shoulder    Tinea cruris    Cervical disc disorder with radiculopathy of mid-cervical region    Low HDL (under 40)    DDD (degenerative disc disease), cervical    Cervical radiculopathy    Onychomycosis    Herniation of intervertebral disc at C5-C6 level    Right arm pain    Cervicalgia    Other specified pre-operative examination     Past Medical History:   Diagnosis Date    A-fib ([facility] )     Colon polyp     Diabetes mellitus ([facility] )     GERD (gastroesophageal reflux disease)     Hyperlipidemia     Hypertension     Liver disease     Hx Hep C treated    Neck pain     Osteoarthritis     TIA (transient ischemic attack)      Social History     Socioeconomic History    Marital status: Single     Spouse name: Not on file    Number of children: Not on file    Years of education: Not on file    Highest education level: Not on file   Occupational History    Occupation: EMPLOYED   Tobacco Use    Smoking status: Former Smoker     Packs/day: 0 20     Quit date: 8/11/2018     Years since quitting: 3 5    Smokeless tobacco: Former User   Vaping Use    Vaping Use: Never used   Substance and Sexual Activity    Alcohol use: No    Drug use: No     Comment: [employer] SAYS ACTIVE    Sexual activity: Yes     Comment: [address] RELATIONSHIP   Other Topics Concern    Not on file   Social History Narrative    Not on file     Social Determinants of Health     Financial Resource Strain: Not on file   Food Insecurity: Not on file   Transportation Needs: Not on file   Physical Activity: Not on file   Stress: Not on file   Social Connections: Not on file   Intimate Partner Violence: Not on file   Housing Stability: Not on file      Family History   Problem Relation Age of Onset    Atrial fibrillation Mother     Hypertension Mother     No Known Problems Father      Past Surgical History:   Procedure Laterality Date    ROLAN HOLE FOR SUBDURAL HEMATOMA      COLONOSCOPY N/A 2/22/2016    Procedure: COLONOSCOPY;  Surgeon: Enid Peña MD;  Location: [facility]; Service:     INGUINAL HERNIA REPAIR      ORTHOPEDIC SURGERY      SHOULDER SURGERY Left        Current Outpatient Medications:     ciclopirox (PENLAC) 8 % solution, APPLY TO AFFECTED NAILS ONCE DAILY, Disp: , Rfl:     clotrimazole (LOTRIMIN) 1 % cream, Apply topically 2 (two) times a day Continue for 2-4 weeks  , Disp: 60 g, Rfl: 0    digoxin (LANOXIN) 0 25 mg tablet, TAKE 1 TABLET BY MOUTH EVERY DAY, Disp: 90 tablet, Rfl: 3    econazole nitrate 1 % cream, Apply topically 2 (two) times a day, Disp: 30 g, Rfl: 0    Eliquis 5 MG, TAKE 1 TABLET TWICE A DAY, Disp: 180 tablet, Rfl: 3    glucose blood (ONE TOUCH ULTRA TEST) test strip, 3 (three) times a day, Disp: , Rfl:     Lancets (ONETOUCH ULTRASOFT) lancets, 3 (three) times a day, Disp: , Rfl:     lisinopril (ZESTRIL) 5 mg tablet, Take 1 tablet (5 mg total) by mouth daily, Disp: 90 tablet, Rfl: 1    metFORMIN (GLUCOPHAGE) 500 mg tablet, TAKE 1 TABLET TWICE A DAY, Disp: 180 tablet, Rfl: 3    metoprolol tartrate (LOPRESSOR) 25 mg tablet, TAKE ONE-HALF (1/2) TABLET EVERY 12 HOURS, Disp: 90 tablet, Rfl: 3    Milk Thistle 250 MG CAPS, Take by mouth, Disp: , Rfl:     omeprazole (PriLOSEC) 20 mg delayed release capsule, TAKE 1 CAPSULE DAILY, Disp: 90 capsule, Rfl: 3    sildenafil (VIAGRA) 50 MG tablet, Take 1 tablet (50 mg total) by mouth daily as needed for erectile dysfunction, Disp: 10 tablet, Rfl: 3    atorvastatin (LIPITOR) 40 mg tablet, Take 1 tablet (40 mg total) by mouth daily (Patient not taking: Reported on 6/30/2021), Disp: 90 tablet, Rfl: 3  No Known Allergies    Review of Systems:  Review of Systems   Constitutional: Negative for activity change, appetite change, fatigue and unexpected weight change  Respiratory: Negative for cough, chest tightness, shortness of breath and wheezing  Cardiovascular: Negative for chest pain, palpitations and leg swelling  Gastrointestinal: Negative for abdominal pain, blood in stool, constipation and diarrhea  Genitourinary: Positive for frequency  Negative for dysuria and hematuria  Musculoskeletal: Negative for arthralgias, back pain, gait problem and joint swelling  Neurological: Negative for dizziness, light-headedness and headaches  Physical Exam:  Vitals:    02/22/22 1536   BP: 150/80   BP Location: Left arm   Patient Position: Sitting   Cuff Size: Large   Pulse: 80   Weight: 97 5 kg (215 lb)       Physical Exam  Constitutional:       General: He is not in acute distress  Appearance: He is obese  He is not ill-appearing  HENT:      Head: Normocephalic and atraumatic        Mouth/Throat:      Mouth: Mucous membranes are moist  Pharynx: No oropharyngeal exudate or posterior oropharyngeal erythema  Eyes:      General: No scleral icterus  Conjunctiva/sclera: Conjunctivae normal    Neck:      Thyroid: No thyroid mass or thyromegaly  Vascular: Normal carotid pulses  No carotid bruit or JVD  Cardiovascular:      Rate and Rhythm: Normal rate  Rhythm irregular  Pulses: Normal pulses  Heart sounds: No murmur heard  No friction rub  No gallop  Pulmonary:      Breath sounds: Normal breath sounds  No wheezing, rhonchi or rales  Abdominal:      Palpations: Abdomen is soft  There is no hepatomegaly, splenomegaly or mass  Tenderness: There is no abdominal tenderness  Musculoskeletal:         General: No swelling  Cervical back: Neck supple  Neurological:      Mental Status: He is alert  Discussion/Summary:  1  Permanent AFib  Rate well controlled digoxin 0 25 mg daily, metoprolol 12 5 mg b i d   On Eliquis 5 mg b i d  For stroke prevention  2  Hypertension  Blood pressure somewhat elevated today  On lisinopril 2 5 mg daily metoprolol at the above-noted dosage  May have to adjust upwards consistent elevation of blood pressures  3  Right bundle branch block  No evidence for bradycardia arrhythmias  4  Low HDL cholesterol  Not on statin therapy  LDL cholesterol 62 with HDL cholesterol 39 and triglycerides of 76   No strong indication for statin in light of favorable LDL cholesterol    Follow-up 1 year      Marleen Marquez MD

## 2022-02-24 DIAGNOSIS — I48.21 PERMANENT ATRIAL FIBRILLATION (HCC): ICD-10-CM

## 2022-02-24 DIAGNOSIS — R80.9 MICROALBUMINURIA: ICD-10-CM

## 2022-02-24 DIAGNOSIS — E11.9 TYPE 2 DIABETES MELLITUS WITHOUT COMPLICATION, UNSPECIFIED WHETHER LONG TERM INSULIN USE (HCC): ICD-10-CM

## 2022-02-24 DIAGNOSIS — I63.519 ACUTE ISCHEMIC CEREBROVASCULAR ACCIDENT (CVA) INVOLVING MIDDLE CEREBRAL ARTERY TERRITORY (HCC): ICD-10-CM

## 2022-02-24 DIAGNOSIS — E11.29 CONTROLLED TYPE 2 DIABETES MELLITUS WITH MICROALBUMINURIA, WITHOUT LONG-TERM CURRENT USE OF INSULIN (HCC): ICD-10-CM

## 2022-02-24 DIAGNOSIS — R80.9 CONTROLLED TYPE 2 DIABETES MELLITUS WITH MICROALBUMINURIA, WITHOUT LONG-TERM CURRENT USE OF INSULIN (HCC): ICD-10-CM

## 2022-02-24 PROCEDURE — 4010F ACE/ARB THERAPY RXD/TAKEN: CPT | Performed by: INTERNAL MEDICINE

## 2022-02-24 PROCEDURE — 3066F NEPHROPATHY DOC TX: CPT | Performed by: INTERNAL MEDICINE

## 2022-02-24 RX ORDER — LISINOPRIL 5 MG/1
TABLET ORAL
Qty: 90 TABLET | Refills: 3 | Status: SHIPPED | OUTPATIENT
Start: 2022-02-24

## 2022-02-26 ENCOUNTER — APPOINTMENT (OUTPATIENT)
Dept: LAB | Facility: MEDICAL CENTER | Age: 57
End: 2022-02-26
Payer: COMMERCIAL

## 2022-02-26 DIAGNOSIS — I10 PRIMARY HYPERTENSION: ICD-10-CM

## 2022-02-26 DIAGNOSIS — B18.2 CHRONIC HEPATITIS C WITHOUT HEPATIC COMA (HCC): ICD-10-CM

## 2022-02-26 DIAGNOSIS — R80.9 MICROALBUMINURIA: ICD-10-CM

## 2022-02-26 DIAGNOSIS — N52.1 ERECTILE DYSFUNCTION DUE TO DISEASES CLASSIFIED ELSEWHERE: ICD-10-CM

## 2022-02-26 DIAGNOSIS — Z72.0 TOBACCO ABUSE: ICD-10-CM

## 2022-02-26 DIAGNOSIS — E11.22 TYPE 2 DIABETES MELLITUS WITH CHRONIC KIDNEY DISEASE, WITHOUT LONG-TERM CURRENT USE OF INSULIN, UNSPECIFIED CKD STAGE (HCC): ICD-10-CM

## 2022-02-26 DIAGNOSIS — I48.0 PAROXYSMAL ATRIAL FIBRILLATION (HCC): ICD-10-CM

## 2022-02-26 DIAGNOSIS — R35.1 NOCTURIA: ICD-10-CM

## 2022-02-26 DIAGNOSIS — D69.6 THROMBOCYTOPENIA (HCC): ICD-10-CM

## 2022-02-26 DIAGNOSIS — I63.10 CEREBROVASCULAR ACCIDENT (CVA) DUE TO EMBOLISM OF PRECEREBRAL ARTERY (HCC): ICD-10-CM

## 2022-02-26 DIAGNOSIS — Z11.4 SCREENING FOR HIV (HUMAN IMMUNODEFICIENCY VIRUS): ICD-10-CM

## 2022-02-26 DIAGNOSIS — E78.2 MIXED HYPERLIPIDEMIA: ICD-10-CM

## 2022-02-26 DIAGNOSIS — K74.60 CIRRHOSIS OF LIVER WITHOUT ASCITES, UNSPECIFIED HEPATIC CIRRHOSIS TYPE (HCC): ICD-10-CM

## 2022-02-26 LAB
ALBUMIN SERPL BCP-MCNC: 4 G/DL (ref 3.5–5)
ALP SERPL-CCNC: 56 U/L (ref 46–116)
ALT SERPL W P-5'-P-CCNC: 39 U/L (ref 12–78)
ANION GAP SERPL CALCULATED.3IONS-SCNC: 2 MMOL/L (ref 4–13)
AST SERPL W P-5'-P-CCNC: 24 U/L (ref 5–45)
BILIRUB SERPL-MCNC: 1.59 MG/DL (ref 0.2–1)
BUN SERPL-MCNC: 18 MG/DL (ref 5–25)
CALCIUM SERPL-MCNC: 9.2 MG/DL (ref 8.3–10.1)
CHLORIDE SERPL-SCNC: 103 MMOL/L (ref 100–108)
CHOLEST SERPL-MCNC: 110 MG/DL
CO2 SERPL-SCNC: 31 MMOL/L (ref 21–32)
CREAT SERPL-MCNC: 0.85 MG/DL (ref 0.6–1.3)
CREAT UR-MCNC: 127 MG/DL
DIGOXIN SERPL-MCNC: 1.2 NG/ML (ref 0.8–2)
ERYTHROCYTE [DISTWIDTH] IN BLOOD BY AUTOMATED COUNT: 14.7 % (ref 11.6–15.1)
GFR SERPL CREATININE-BSD FRML MDRD: 96 ML/MIN/1.73SQ M
GLUCOSE P FAST SERPL-MCNC: 190 MG/DL (ref 65–99)
HCT VFR BLD AUTO: 42.1 % (ref 36.5–49.3)
HDLC SERPL-MCNC: 33 MG/DL
HGB BLD-MCNC: 14.1 G/DL (ref 12–17)
LDLC SERPL CALC-MCNC: 54 MG/DL (ref 0–100)
MCH RBC QN AUTO: 30.7 PG (ref 26.8–34.3)
MCHC RBC AUTO-ENTMCNC: 33.5 G/DL (ref 31.4–37.4)
MCV RBC AUTO: 92 FL (ref 82–98)
MICROALBUMIN UR-MCNC: 232 MG/L (ref 0–20)
MICROALBUMIN/CREAT 24H UR: 183 MG/G CREATININE (ref 0–30)
PLATELET # BLD AUTO: 127 THOUSANDS/UL (ref 149–390)
PMV BLD AUTO: 10.9 FL (ref 8.9–12.7)
POTASSIUM SERPL-SCNC: 4.4 MMOL/L (ref 3.5–5.3)
PROT SERPL-MCNC: 7.1 G/DL (ref 6.4–8.2)
PSA SERPL-MCNC: 0.2 NG/ML (ref 0–4)
RBC # BLD AUTO: 4.6 MILLION/UL (ref 3.88–5.62)
SODIUM SERPL-SCNC: 136 MMOL/L (ref 136–145)
TRIGL SERPL-MCNC: 114 MG/DL
TSH SERPL DL<=0.05 MIU/L-ACNC: 2.02 UIU/ML (ref 0.36–3.74)
WBC # BLD AUTO: 6.28 THOUSAND/UL (ref 4.31–10.16)

## 2022-02-26 PROCEDURE — 85027 COMPLETE CBC AUTOMATED: CPT

## 2022-02-26 PROCEDURE — 84153 ASSAY OF PSA TOTAL: CPT

## 2022-02-26 PROCEDURE — 36415 COLL VENOUS BLD VENIPUNCTURE: CPT

## 2022-02-26 PROCEDURE — 84443 ASSAY THYROID STIM HORMONE: CPT

## 2022-02-26 PROCEDURE — 80053 COMPREHEN METABOLIC PANEL: CPT

## 2022-02-26 PROCEDURE — 82043 UR ALBUMIN QUANTITATIVE: CPT

## 2022-02-26 PROCEDURE — 3060F POS MICROALBUMINURIA REV: CPT | Performed by: INTERNAL MEDICINE

## 2022-02-26 PROCEDURE — 80162 ASSAY OF DIGOXIN TOTAL: CPT | Performed by: INTERNAL MEDICINE

## 2022-02-26 PROCEDURE — 82570 ASSAY OF URINE CREATININE: CPT

## 2022-02-26 PROCEDURE — 87389 HIV-1 AG W/HIV-1&-2 AB AG IA: CPT

## 2022-02-26 PROCEDURE — 80061 LIPID PANEL: CPT

## 2022-02-27 LAB — HIV 1+2 AB+HIV1 P24 AG SERPL QL IA: NORMAL

## 2022-04-30 ENCOUNTER — APPOINTMENT (OUTPATIENT)
Dept: LAB | Facility: MEDICAL CENTER | Age: 57
End: 2022-04-30
Payer: COMMERCIAL

## 2022-04-30 DIAGNOSIS — K70.30 ALCOHOLIC CIRRHOSIS OF LIVER WITHOUT ASCITES (HCC): ICD-10-CM

## 2022-04-30 LAB
ALBUMIN SERPL BCP-MCNC: 4 G/DL (ref 3.5–5)
ALP SERPL-CCNC: 58 U/L (ref 46–116)
ALT SERPL W P-5'-P-CCNC: 47 U/L (ref 12–78)
ANION GAP SERPL CALCULATED.3IONS-SCNC: 1 MMOL/L (ref 4–13)
AST SERPL W P-5'-P-CCNC: 23 U/L (ref 5–45)
BILIRUB SERPL-MCNC: 1.55 MG/DL (ref 0.2–1)
BUN SERPL-MCNC: 14 MG/DL (ref 5–25)
CALCIUM SERPL-MCNC: 9 MG/DL (ref 8.3–10.1)
CHLORIDE SERPL-SCNC: 103 MMOL/L (ref 100–108)
CO2 SERPL-SCNC: 34 MMOL/L (ref 21–32)
CREAT SERPL-MCNC: 0.91 MG/DL (ref 0.6–1.3)
ERYTHROCYTE [DISTWIDTH] IN BLOOD BY AUTOMATED COUNT: 14.7 % (ref 11.6–15.1)
GFR SERPL CREATININE-BSD FRML MDRD: 92 ML/MIN/1.73SQ M
GLUCOSE P FAST SERPL-MCNC: 223 MG/DL (ref 65–99)
HCT VFR BLD AUTO: 42.1 % (ref 36.5–49.3)
HGB BLD-MCNC: 14.5 G/DL (ref 12–17)
INR PPP: 1.15 (ref 0.84–1.19)
MCH RBC QN AUTO: 31.3 PG (ref 26.8–34.3)
MCHC RBC AUTO-ENTMCNC: 34.4 G/DL (ref 31.4–37.4)
MCV RBC AUTO: 91 FL (ref 82–98)
PLATELET # BLD AUTO: 111 THOUSANDS/UL (ref 149–390)
PMV BLD AUTO: 10.4 FL (ref 8.9–12.7)
POTASSIUM SERPL-SCNC: 4.3 MMOL/L (ref 3.5–5.3)
PROT SERPL-MCNC: 6.9 G/DL (ref 6.4–8.2)
PROTHROMBIN TIME: 14.3 SECONDS (ref 11.6–14.5)
RBC # BLD AUTO: 4.63 MILLION/UL (ref 3.88–5.62)
SODIUM SERPL-SCNC: 138 MMOL/L (ref 136–145)
WBC # BLD AUTO: 5.31 THOUSAND/UL (ref 4.31–10.16)

## 2022-04-30 PROCEDURE — 82104 ALPHA-1-ANTITRYPSIN PHENO: CPT

## 2022-04-30 PROCEDURE — 85027 COMPLETE CBC AUTOMATED: CPT

## 2022-04-30 PROCEDURE — 80053 COMPREHEN METABOLIC PANEL: CPT

## 2022-04-30 PROCEDURE — 85610 PROTHROMBIN TIME: CPT

## 2022-04-30 PROCEDURE — 36415 COLL VENOUS BLD VENIPUNCTURE: CPT

## 2022-04-30 PROCEDURE — 82103 ALPHA-1-ANTITRYPSIN TOTAL: CPT

## 2022-05-09 LAB
A1AT PHENOTYP SERPL IFE: NORMAL
A1AT SERPL-MCNC: 114 MG/DL (ref 101–187)

## 2022-05-20 ENCOUNTER — OFFICE VISIT (OUTPATIENT)
Dept: FAMILY MEDICINE CLINIC | Facility: CLINIC | Age: 57
End: 2022-05-20
Payer: COMMERCIAL

## 2022-05-20 VITALS
HEART RATE: 80 BPM | SYSTOLIC BLOOD PRESSURE: 124 MMHG | HEIGHT: 72 IN | DIASTOLIC BLOOD PRESSURE: 82 MMHG | TEMPERATURE: 98.1 F | BODY MASS INDEX: 28.53 KG/M2 | WEIGHT: 210.6 LBS

## 2022-05-20 DIAGNOSIS — I10 PRIMARY HYPERTENSION: ICD-10-CM

## 2022-05-20 DIAGNOSIS — K42.9 UMBILICAL HERNIA WITHOUT OBSTRUCTION AND WITHOUT GANGRENE: Primary | ICD-10-CM

## 2022-05-20 PROCEDURE — 1036F TOBACCO NON-USER: CPT | Performed by: NURSE PRACTITIONER

## 2022-05-20 PROCEDURE — 3008F BODY MASS INDEX DOCD: CPT | Performed by: NURSE PRACTITIONER

## 2022-05-20 PROCEDURE — 3074F SYST BP LT 130 MM HG: CPT | Performed by: NURSE PRACTITIONER

## 2022-05-20 PROCEDURE — 99214 OFFICE O/P EST MOD 30 MIN: CPT | Performed by: NURSE PRACTITIONER

## 2022-05-20 PROCEDURE — 3079F DIAST BP 80-89 MM HG: CPT | Performed by: NURSE PRACTITIONER

## 2022-05-20 NOTE — ASSESSMENT & PLAN NOTE
General surgery referral was placed for surgical correction of noted umbilical hernia  Ultrasound of the abdominal wall was ordered to assess for the presence of a ventral hernia in the area where patient is reporting recurrent pain  I offered to give patient a note allowing for work restrictions where he would lift no more than 25 lbs  however, patient refused this  Patient was advised that after surgical procedure he will have weight restrictions for a few weeks  He reports that he will follow these restrictions after surgery  Patient was informed of signs of herniation and if any of these are noted he was advised to be seen in the ED immediately

## 2022-05-20 NOTE — PROGRESS NOTES
Assessment and Plan:    Problem List Items Addressed This Visit        Cardiovascular and Mediastinum    Hypertension     Well controlled on current regimen  Other    Umbilical hernia without obstruction and without gangrene - Primary     General surgery referral was placed for surgical correction of noted umbilical hernia  Ultrasound of the abdominal wall was ordered to assess for the presence of a ventral hernia in the area where patient is reporting recurrent pain  I offered to give patient a note allowing for work restrictions where he would lift no more than 25 lbs  however, patient refused this  Patient was advised that after surgical procedure he will have weight restrictions for a few weeks  He reports that he will follow these restrictions after surgery  Patient was informed of signs of herniation and if any of these are noted he was advised to be seen in the ED immediately  Relevant Orders    US abdominal wall    Ambulatory Referral to General Surgery                 Diagnoses and all orders for this visit:    Umbilical hernia without obstruction and without gangrene  -     US abdominal wall; Future  -     Ambulatory Referral to General Surgery; Future    Primary hypertension            Subjective:      Patient ID: Anny Breaux is a 61 y o  male  CC:    Chief Complaint   Patient presents with    Abdominal Pain     Pt states he is experiencing some discomfort in abdomen  x 1mth  HPI:    Abdominal pain: The patient reports he has been experiencing lower abdominal pain over the past month  He reports the pain increases after performing physical activity  He does work in a [employer] [employer]  and 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  He denies any history of hernia in the past   He denies any nausea, vomiting, diarrhea, constipation, or hematochezia  Hypertension:  Well controlled on current dosages of lisinopril and metoprolol    Patient denies any lightheadedness, dizziness, or ort orthostasis  The following portions of the patient's history were reviewed and updated as appropriate: allergies, current medications, past family history, past medical history, past social history, past surgical history and problem list       Review of Systems   Constitutional: Negative for chills and fever  HENT: Negative for ear pain and sore throat  Eyes: Negative for pain and visual disturbance  Respiratory: Negative for cough, chest tightness, shortness of breath and wheezing  Cardiovascular: Negative for chest pain, palpitations and leg swelling  Gastrointestinal: Positive for abdominal pain (lower)  Negative for abdominal distention, anal bleeding, blood in stool, constipation, diarrhea, nausea, rectal pain and vomiting  Endocrine: Negative for cold intolerance and heat intolerance  Genitourinary: Negative for decreased urine volume, dysuria and hematuria  Musculoskeletal: Negative for arthralgias, back pain and myalgias  Skin: Negative for color change and rash  Allergic/Immunologic: Negative for environmental allergies  Neurological: Negative for dizziness, seizures, syncope, weakness, light-headedness, numbness and headaches  Hematological: Negative for adenopathy  Psychiatric/Behavioral: Negative for confusion  The patient is not nervous/anxious  All other systems reviewed and are negative  Data to review:       Objective:    Vitals:    05/20/22 1416   BP: 124/82   Pulse: 80   Temp: 98 1 °F (36 7 °C)   Weight: 95 5 kg (210 lb 9 6 oz)   Height: 6' (1 829 m)        Physical Exam  Vitals and nursing note reviewed  Constitutional:       General: He is not in acute distress  Appearance: Normal appearance  He is well-developed  He is not ill-appearing  HENT:      Head: Normocephalic and atraumatic  Eyes:      Conjunctiva/sclera: Conjunctivae normal    Cardiovascular:      Rate and Rhythm: Normal rate and regular rhythm  Pulses: Normal pulses  Carotid pulses are 2+ on the right side and 2+ on the left side  Radial pulses are 2+ on the right side and 2+ on the left side  Posterior tibial pulses are 2+ on the right side and 2+ on the left side  Heart sounds: Normal heart sounds  No murmur heard  Pulmonary:      Effort: Pulmonary effort is normal  No respiratory distress  Breath sounds: Normal breath sounds  No wheezing or rhonchi  Abdominal:      General: Abdomen is flat  Bowel sounds are normal  There is no distension  Palpations: Abdomen is soft  Tenderness: There is no abdominal tenderness  There is no guarding  Hernia: A hernia is present  Hernia is present in the umbilical area  Comments: Moderate-sized umbilical hernia noted on assessment  No lumps or other masses were palpated in midline lower abdomen where patient has been experiencing recurrent pain  Musculoskeletal:         General: Normal range of motion  Cervical back: Normal range of motion and neck supple  Right lower leg: No edema  Left lower leg: No edema  Skin:     General: Skin is warm and dry  Capillary Refill: Capillary refill takes less than 2 seconds  Neurological:      General: No focal deficit present  Mental Status: He is alert and oriented to person, place, and time  Psychiatric:         Mood and Affect: Mood normal          Behavior: Behavior normal          Thought Content:  Thought content normal          Judgment: Judgment normal

## 2022-05-23 ENCOUNTER — HOSPITAL ENCOUNTER (OUTPATIENT)
Dept: ULTRASOUND IMAGING | Facility: HOSPITAL | Age: 57
Discharge: HOME | End: 2022-05-23
Payer: COMMERCIAL

## 2022-05-23 DIAGNOSIS — K42.9 UMBILICAL HERNIA WITHOUT OBSTRUCTION AND WITHOUT GANGRENE: ICD-10-CM

## 2022-05-23 PROCEDURE — 76705 ECHO EXAM OF ABDOMEN: CPT

## 2022-05-26 ENCOUNTER — TELEPHONE (OUTPATIENT)
Dept: FAMILY MEDICINE CLINIC | Facility: CLINIC | Age: 57
End: 2022-05-26

## 2022-05-26 NOTE — TELEPHONE ENCOUNTER
Patient notified of US results and recommendations   Pt is already scheduled with Gen Surgery Dr Catia Verma

## 2022-05-26 NOTE — TELEPHONE ENCOUNTER
----- Message from Diamante Grace DO sent at 5/26/2022  9:42 AM EDT -----  Patient's ultrasound of abdominal wall shows large periumbilical hernia which contains fat    Patient to make appointment with his PCP to discuss treatment

## 2022-06-08 DIAGNOSIS — R80.9 CONTROLLED TYPE 2 DIABETES MELLITUS WITH MICROALBUMINURIA, WITHOUT LONG-TERM CURRENT USE OF INSULIN (HCC): ICD-10-CM

## 2022-06-08 DIAGNOSIS — E11.9 TYPE 2 DIABETES MELLITUS WITHOUT COMPLICATION, UNSPECIFIED WHETHER LONG TERM INSULIN USE (HCC): ICD-10-CM

## 2022-06-08 DIAGNOSIS — E11.29 CONTROLLED TYPE 2 DIABETES MELLITUS WITH MICROALBUMINURIA, WITHOUT LONG-TERM CURRENT USE OF INSULIN (HCC): ICD-10-CM

## 2022-06-08 DIAGNOSIS — I63.519 ACUTE ISCHEMIC CEREBROVASCULAR ACCIDENT (CVA) INVOLVING MIDDLE CEREBRAL ARTERY TERRITORY (HCC): ICD-10-CM

## 2022-06-08 DIAGNOSIS — I48.21 PERMANENT ATRIAL FIBRILLATION (HCC): ICD-10-CM

## 2022-06-08 PROCEDURE — 3066F NEPHROPATHY DOC TX: CPT | Performed by: NURSE PRACTITIONER

## 2022-06-29 ENCOUNTER — VBI (OUTPATIENT)
Dept: ADMINISTRATIVE | Facility: OTHER | Age: 57
End: 2022-06-29

## 2022-07-14 LAB — HBA1C MFR BLD HPLC: 8 %

## 2022-07-14 PROCEDURE — 3052F HG A1C>EQUAL 8.0%<EQUAL 9.0%: CPT | Performed by: NURSE PRACTITIONER

## 2022-07-22 ENCOUNTER — OFFICE VISIT (OUTPATIENT)
Dept: FAMILY MEDICINE CLINIC | Facility: CLINIC | Age: 57
End: 2022-07-22
Payer: COMMERCIAL

## 2022-07-22 VITALS
BODY MASS INDEX: 28.09 KG/M2 | DIASTOLIC BLOOD PRESSURE: 78 MMHG | WEIGHT: 207.4 LBS | SYSTOLIC BLOOD PRESSURE: 134 MMHG | HEART RATE: 68 BPM | HEIGHT: 72 IN | OXYGEN SATURATION: 98 %

## 2022-07-22 DIAGNOSIS — I10 PRIMARY HYPERTENSION: ICD-10-CM

## 2022-07-22 DIAGNOSIS — R25.2 LEG CRAMP: Primary | ICD-10-CM

## 2022-07-22 PROCEDURE — 3078F DIAST BP <80 MM HG: CPT | Performed by: NURSE PRACTITIONER

## 2022-07-22 PROCEDURE — 99214 OFFICE O/P EST MOD 30 MIN: CPT | Performed by: NURSE PRACTITIONER

## 2022-07-22 PROCEDURE — 3075F SYST BP GE 130 - 139MM HG: CPT | Performed by: NURSE PRACTITIONER

## 2022-07-22 NOTE — LETTER
July 22, 2022     Patient: Patrick Alexander  YOB: 1965  Date of Visit: 7/22/2022      To Whom it May Concern:    Senthil De La Rosa is under my professional care  Carpio Nino was seen in my office on 7/22/2022  Balaji Oneill is excused from work on 7/22/22 as he was being treated for a medical condition  He may return to work on 7/25/22 with no restrictions  If you have any questions or concerns, please don't hesitate to call           Sincerely,          NISHA Rodriguez        CC: No Recipients

## 2022-07-22 NOTE — ASSESSMENT & PLAN NOTE
Patient's symptoms are consistent with cramps caused by dehydration  Patient was advised to continue to drink water throughout the day and to strive to drink 5-6 bottles of water per day to prevent dehydration

## 2022-07-25 ENCOUNTER — TELEPHONE (OUTPATIENT)
Dept: ADMINISTRATIVE | Facility: OTHER | Age: 57
End: 2022-07-25

## 2022-07-25 NOTE — TELEPHONE ENCOUNTER
Upon review of the In Basket request we were able to locate, review, and update the patient chart as requested for Hemoglobin A1c  Any additional questions or concerns should be emailed to the Practice Liaisons via [email or web address] email, please do not reply via In Basket      Thank you  Ibrahima Lee MA

## 2022-07-25 NOTE — TELEPHONE ENCOUNTER
----- Message from Henrietta Bell sent at 7/22/2022 10:16 AM EDT -----  Regarding: Care Gap request  07/22/22 10:16 AM    Hello, our patient attached above has had Hemoglobin A1c completed/performed  Please assist in updating the patient chart by pulling the Care Everywhere (CE) document  The date of service is 7/14/2022       Thank you,  Henrietta Bell  [facility] [address] PRIMARY CARE

## 2022-08-02 ENCOUNTER — CONSULT (OUTPATIENT)
Dept: SURGERY | Facility: CLINIC | Age: 57
End: 2022-08-02
Payer: COMMERCIAL

## 2022-08-02 VITALS
TEMPERATURE: 97.2 F | OXYGEN SATURATION: 98 % | HEART RATE: 96 BPM | DIASTOLIC BLOOD PRESSURE: 78 MMHG | SYSTOLIC BLOOD PRESSURE: 130 MMHG | HEIGHT: 72 IN | WEIGHT: 207 LBS | BODY MASS INDEX: 28.04 KG/M2

## 2022-08-02 DIAGNOSIS — K42.0 INCARCERATED UMBILICAL HERNIA: Primary | ICD-10-CM

## 2022-08-02 DIAGNOSIS — K42.9 UMBILICAL HERNIA WITHOUT OBSTRUCTION AND WITHOUT GANGRENE: ICD-10-CM

## 2022-08-02 PROCEDURE — 3075F SYST BP GE 130 - 139MM HG: CPT | Performed by: SPECIALIST

## 2022-08-02 PROCEDURE — 99243 OFF/OP CNSLTJ NEW/EST LOW 30: CPT | Performed by: SPECIALIST

## 2022-08-02 PROCEDURE — 3078F DIAST BP <80 MM HG: CPT | Performed by: SPECIALIST

## 2022-08-02 NOTE — PROGRESS NOTES
Chief Complaint:  Incarcerated umbilical hernia      History of Present Illness:  Patient is a 50-year-old white male with history of type 2 diabetes mellitus and hepatitis-C who presents to the office today with an incarcerated umbilical hernia  He denies any significant discomfort in the area  He says he had an ultrasound that confirmed the hernia  He was referred by his family physician for consideration of umbilical herniorrhaphy  The patient denies any nausea vomiting diarrhea constipation  Past Medical History:   Past Medical History:   Diagnosis Date    [facility]-fib [facility])     Colon polyp     Diabetes mellitus ([address] )     GERD (gastroesophageal reflux disease)     Hyperlipidemia     Hypertension     Liver disease     Hx Hep C treated    Neck pain     Osteoarthritis     TIA (transient ischemic attack)          Past Surgical History:    Past Surgical History:   Procedure Laterality Date    ROLAN HOLE FOR SUBDURAL HEMATOMA      COLONOSCOPY N/A 2/22/2016    Procedure: COLONOSCOPY;  Surgeon: Rachel Catalan MD;  Location: [facility]; Service:     INGUINAL HERNIA REPAIR      ORTHOPEDIC SURGERY      SHOULDER SURGERY Left          Allergies:  No Known Allergies      Medications:    Current Outpatient Medications:     atorvastatin (LIPITOR) 40 mg tablet, Take 1 tablet (40 mg total) by mouth daily, Disp: 90 tablet, Rfl: 3    ciclopirox (PENLAC) 8 % solution, APPLY TO AFFECTED NAILS ONCE DAILY, Disp: , Rfl:     clotrimazole (LOTRIMIN) 1 % cream, Apply topically 2 (two) times a day Continue for 2-4 weeks  , Disp: 60 g, Rfl: 0    digoxin (LANOXIN) 0 25 mg tablet, TAKE 1 TABLET BY MOUTH EVERY DAY, Disp: 90 tablet, Rfl: 3    econazole nitrate 1 % cream, Apply topically 2 (two) times a day, Disp: 30 g, Rfl: 0    Eliquis 5 MG, TAKE 1 TABLET TWICE A DAY, Disp: 180 tablet, Rfl: 3    glucose blood test strip, 3 (three) times a day, Disp: , Rfl:     Lancets (ONETOUCH ULTRASOFT) lancets, 3 (three) times a day, Disp: , Rfl:     lisinopril (ZESTRIL) 5 mg tablet, TAKE 1 TABLET DAILY, Disp: 90 tablet, Rfl: 3    metFORMIN (GLUCOPHAGE) 500 mg tablet, TAKE 1 TABLET TWICE A DAY, Disp: 180 tablet, Rfl: 3    metoprolol tartrate (LOPRESSOR) 25 mg tablet, Take 0 5 tablets (12 5 mg total) by mouth every 12 (twelve) hours, Disp: 180 tablet, Rfl: 1    Milk Thistle 250 MG CAPS, Take by mouth, Disp: , Rfl:     omeprazole (PriLOSEC) 20 mg delayed release capsule, TAKE 1 CAPSULE DAILY, Disp: 90 capsule, Rfl: 3    sildenafil (VIAGRA) 50 MG tablet, Take 1 tablet (50 mg total) by mouth daily as needed for erectile dysfunction, Disp: 10 tablet, Rfl: 3      Social History:  Social History     Social History     Substance and Sexual Activity   Alcohol Use No     Social History     Substance and Sexual Activity   Drug Use No    Comment: ALL SCRIPTS SAYS ACTIVE     Social History     Tobacco Use   Smoking Status Former Smoker    Packs/day: 0 20    Quit date: 8/11/2018    Years since quitting: 3 9   Smokeless Tobacco Former User         Family History:    Family History   Problem Relation Age of Onset    Atrial fibrillation Mother     Hypertension Mother     No Known Problems Father          Review of Systems:        Vitals:  Vitals:    08/02/22 1616   BP: 130/78   Pulse: 96   Temp: (!) 97 2 °F (36 2 °C)   SpO2: 98%       Physical Exam:  Patient middle-aged white male who is awake alert no distress    Vital signs as above    Abdomen there is a visible umbilical hernia that is not particularly tender to palpation  It is incarcerated not reducible  Lab Results: I have personally reviewed pertinent reports  See below  Imaging: I have personally reviewed pertinent imaging studies primarily ultrasound of the abdomen  EKG, Pathology, and Other Studies: I have personally reviewed pertinent reports  No visits with results within 1 Day(s) from this visit     Latest known visit with results is:   Telephone on 07/25/2022   Component Date Value    Hemoglobin A1C 07/14/2022 8 0          Impression:  Incarcerated umbilical hernia  Essentially asymptomatic  The patient would like to undergo repair but in the winter which is reasonable  Plan:  Return in the winter to schedule repair  If it gets bigger or starts to cause some discomfort he should give us a call and he would be re-evaluated sooner

## 2022-08-02 NOTE — LETTER
August 2, 2022     Gabriela Delgado, [address]  [place]   [address]  [phone]    Patient: Kyle Astorga   YOB: 1965   Date of Visit: 8/2/2022       Dear Ms Pavon    Thank you for referring Alberto Leblanc to me for evaluation  Below are my notes for this consultation  If you have questions, please do not hesitate to call me  I look forward to following your patient along with you  Sincerely,    Netta Schmidt MD        CC: MD Kuldip Mcnamara MD  8/2/2022  5:21 PM  Sign when Signing Visit  Chief Complaint:  Incarcerated umbilical hernia      History of Present Illness:  Patient is a 58-year-old white male with history of type 2 diabetes mellitus and hepatitis-C who presents to the office today with an incarcerated umbilical hernia  He denies any significant discomfort in the area  He says he had an ultrasound that confirmed the hernia  He was referred by his family physician for consideration of umbilical herniorrhaphy  The patient denies any nausea vomiting diarrhea constipation  Past Medical History:   Past Medical History:   Diagnosis Date    [facility]fib [facility])     Colon polyp     Diabetes mellitus ([address] )     GERD (gastroesophageal reflux disease)     Hyperlipidemia     Hypertension     Liver disease     Hx Hep C treated    Neck pain     Osteoarthritis     TIA (transient ischemic attack)          Past Surgical History:    Past Surgical History:   Procedure Laterality Date    ROLAN HOLE FOR SUBDURAL HEMATOMA      COLONOSCOPY N/A 2/22/2016    Procedure: COLONOSCOPY;  Surgeon: Arsen Franks MD;  Location: [facility];   Service:     INGUINAL HERNIA REPAIR      ORTHOPEDIC SURGERY      SHOULDER SURGERY Left          Allergies:  No Known Allergies      Medications:    Current Outpatient Medications:     atorvastatin (LIPITOR) 40 mg tablet, Take 1 tablet (40 mg total) by mouth daily, Disp: 90 tablet, Rfl: 3    ciclopirox (PENLAC) 8 % solution, APPLY TO AFFECTED NAILS ONCE DAILY, Disp: , Rfl:     clotrimazole (LOTRIMIN) 1 % cream, Apply topically 2 (two) times a day Continue for 2-4 weeks  , Disp: 60 g, Rfl: 0    digoxin (LANOXIN) 0 25 mg tablet, TAKE 1 TABLET BY MOUTH EVERY DAY, Disp: 90 tablet, Rfl: 3    econazole nitrate 1 % cream, Apply topically 2 (two) times a day, Disp: 30 g, Rfl: 0    Eliquis 5 MG, TAKE 1 TABLET TWICE A DAY, Disp: 180 tablet, Rfl: 3    glucose blood test strip, 3 (three) times a day, Disp: , Rfl:     Lancets (ONETOUCH ULTRASOFT) lancets, 3 (three) times a day, Disp: , Rfl:     lisinopril (ZESTRIL) 5 mg tablet, TAKE 1 TABLET DAILY, Disp: 90 tablet, Rfl: 3    metFORMIN (GLUCOPHAGE) 500 mg tablet, TAKE 1 TABLET TWICE A DAY, Disp: 180 tablet, Rfl: 3    metoprolol tartrate (LOPRESSOR) 25 mg tablet, Take 0 5 tablets (12 5 mg total) by mouth every 12 (twelve) hours, Disp: 180 tablet, Rfl: 1    Milk Thistle 250 MG CAPS, Take by mouth, Disp: , Rfl:     omeprazole (PriLOSEC) 20 mg delayed release capsule, TAKE 1 CAPSULE DAILY, Disp: 90 capsule, Rfl: 3    sildenafil (VIAGRA) 50 MG tablet, Take 1 tablet (50 mg total) by mouth daily as needed for erectile dysfunction, Disp: 10 tablet, Rfl: 3      Social History:  Social History     Social History     Substance and Sexual Activity   Alcohol Use No     Social History     Substance and Sexual Activity   Drug Use No    Comment: ALL SCRIPTS SAYS ACTIVE     Social History     Tobacco Use   Smoking Status Former Smoker    Packs/day: 0 20    Quit date: 8/11/2018    Years since quitting: 3 9   Smokeless Tobacco Former User         Family History:    Family History   Problem Relation Age of Onset    Atrial fibrillation Mother     Hypertension Mother     No Known Problems Father          Review of Systems:        Vitals:  Vitals:    08/02/22 1616   BP: 130/78   Pulse: 96   Temp: (!) 97 2 °F (36 2 °C)   SpO2: 98%       Physical Exam:  Patient middle-aged white male who is awake alert no distress    Vital signs as above    Abdomen there is a visible umbilical hernia that is not particularly tender to palpation  It is incarcerated not reducible  Lab Results: I have personally reviewed pertinent reports  See below  Imaging: I have personally reviewed pertinent imaging studies primarily ultrasound of the abdomen  EKG, Pathology, and Other Studies: I have personally reviewed pertinent reports  No visits with results within 1 Day(s) from this visit  Latest known visit with results is:   Telephone on 07/25/2022   Component Date Value    Hemoglobin A1C 07/14/2022 8 0          Impression:  Incarcerated umbilical hernia  Essentially asymptomatic  The patient would like to undergo repair but in the winter which is reasonable  Plan:  Return in the winter to schedule repair  If it gets bigger or starts to cause some discomfort he should give us a call and he would be re-evaluated sooner

## 2022-11-04 ENCOUNTER — VBI (OUTPATIENT)
Dept: ADMINISTRATIVE | Facility: OTHER | Age: 57
End: 2022-11-04

## 2022-11-07 NOTE — PROGRESS NOTES
Assessment and Plan:    Problem List Items Addressed This Visit        Cardiovascular and Mediastinum    Hypertension     Well controlled on current regimen  Other    Leg cramp - Primary     Patient's symptoms are consistent with cramps caused by dehydration  Patient was advised to continue to drink water throughout the day and to strive to drink 5-6 bottles of water per day to prevent dehydration  Diagnoses and all orders for this visit:    Leg cramp    Primary hypertension            Subjective:      Patient ID: Roland Wall is a 61 y o  male  CC:    Chief Complaint   Patient presents with    Leg Pain     Pt states he has right leg pain and cramping  He thinks it may be from Dehydration  kw       HPI:    RLE pain: The patient reports that he had right thigh pain this morning when he woke up  He states that he stayed home from work today as the pain was severe  He states that he began to drink water this morning and the pain has resolved  He denies any calf pain of his right lower extremity  The patient also denies any edema, warmth to touch, or redness of his thigh or calf area  The patient reports that he was working outside yesterday in the heat and drank very little water  Patient is currently anticoagulated on Eliquis due to a history of AFib  Hypertension:  Well controlled on current dosages of lisinopril and Lopressor  Patient denies lightheadedness, dizziness, or orthostasis  The following portions of the patient's history were reviewed and updated as appropriate: allergies, current medications, past family history, past medical history, past social history, past surgical history and problem list       Review of Systems   Constitutional: Negative for chills and fever  HENT: Negative for ear pain and sore throat  Eyes: Negative for pain and visual disturbance  Respiratory: Negative for cough, chest tightness, shortness of breath and wheezing  Cardiovascular: Negative for chest pain, palpitations and leg swelling  Gastrointestinal: Negative for abdominal pain, constipation, diarrhea, nausea and vomiting  Endocrine: Negative for cold intolerance and heat intolerance  Genitourinary: Negative for decreased urine volume, dysuria and hematuria  Musculoskeletal: Positive for myalgias (right thigh)  Negative for arthralgias, back pain, gait problem and joint swelling  Skin: Negative for color change and rash  Allergic/Immunologic: Negative for environmental allergies  Neurological: Negative for dizziness, seizures, syncope, weakness, light-headedness, numbness and headaches  Hematological: Negative for adenopathy  Psychiatric/Behavioral: Negative for confusion  The patient is not nervous/anxious  All other systems reviewed and are negative  Data to review:       Objective:    Vitals:    07/22/22 1009   BP: 134/78   BP Location: Right arm   Patient Position: Sitting   Pulse: 68   SpO2: 98%   Weight: 94 1 kg (207 lb 6 4 oz)   Height: 6' (1 829 m)        Physical Exam  Vitals and nursing note reviewed  Constitutional:       General: He is not in acute distress  Appearance: Normal appearance  He is well-developed  He is not ill-appearing  HENT:      Head: Normocephalic and atraumatic  Eyes:      Conjunctiva/sclera: Conjunctivae normal    Cardiovascular:      Rate and Rhythm: Normal rate and regular rhythm  Pulses: Normal pulses  Carotid pulses are 2+ on the right side and 2+ on the left side  Radial pulses are 2+ on the right side and 2+ on the left side  Dorsalis pedis pulses are 2+ on the right side and 2+ on the left side  Posterior tibial pulses are 2+ on the right side and 2+ on the left side  Heart sounds: Normal heart sounds  No murmur heard  Comments: No signs of DVT noted in bilateral lower extremities    Pulmonary:      Effort: Pulmonary effort is normal  No respiratory distress  Breath sounds: Normal breath sounds  No wheezing or rhonchi  Abdominal:      General: Abdomen is flat  Bowel sounds are normal  There is no distension  Palpations: Abdomen is soft  Tenderness: There is no abdominal tenderness  There is no guarding  Musculoskeletal:         General: Normal range of motion  Cervical back: Normal range of motion and neck supple  Right upper leg: No swelling, edema, tenderness or bony tenderness  Right lower leg: No edema  Left lower leg: No edema  Skin:     General: Skin is warm and dry  Capillary Refill: Capillary refill takes less than 2 seconds  Neurological:      General: No focal deficit present  Mental Status: He is alert and oriented to person, place, and time  Psychiatric:         Mood and Affect: Mood normal          Behavior: Behavior normal          Thought Content:  Thought content normal          Judgment: Judgment normal  Admission

## 2022-12-23 DIAGNOSIS — R80.9 CONTROLLED TYPE 2 DIABETES MELLITUS WITH MICROALBUMINURIA, WITHOUT LONG-TERM CURRENT USE OF INSULIN (HCC): ICD-10-CM

## 2022-12-23 DIAGNOSIS — E11.9 TYPE 2 DIABETES MELLITUS WITHOUT COMPLICATION, UNSPECIFIED WHETHER LONG TERM INSULIN USE (HCC): ICD-10-CM

## 2022-12-23 DIAGNOSIS — I63.519 ACUTE ISCHEMIC CEREBROVASCULAR ACCIDENT (CVA) INVOLVING MIDDLE CEREBRAL ARTERY TERRITORY (HCC): ICD-10-CM

## 2022-12-23 DIAGNOSIS — I48.20 CHRONIC ATRIAL FIBRILLATION (HCC): ICD-10-CM

## 2022-12-23 DIAGNOSIS — I48.21 PERMANENT ATRIAL FIBRILLATION (HCC): ICD-10-CM

## 2022-12-23 DIAGNOSIS — E11.29 CONTROLLED TYPE 2 DIABETES MELLITUS WITH MICROALBUMINURIA, WITHOUT LONG-TERM CURRENT USE OF INSULIN (HCC): ICD-10-CM

## 2023-01-17 DIAGNOSIS — E11.9 TYPE 2 DIABETES MELLITUS WITHOUT COMPLICATION, UNSPECIFIED WHETHER LONG TERM INSULIN USE (HCC): ICD-10-CM

## 2023-01-17 DIAGNOSIS — K21.9 GASTROESOPHAGEAL REFLUX DISEASE WITHOUT ESOPHAGITIS: Primary | ICD-10-CM

## 2023-01-17 DIAGNOSIS — R80.9 MICROALBUMINURIA: ICD-10-CM

## 2023-01-17 DIAGNOSIS — E78.6 LOW HDL (UNDER 40): ICD-10-CM

## 2023-01-17 DIAGNOSIS — R35.1 NOCTURIA: ICD-10-CM

## 2023-01-17 RX ORDER — OMEPRAZOLE 20 MG/1
20 CAPSULE, DELAYED RELEASE ORAL DAILY
Qty: 90 CAPSULE | Refills: 3 | Status: SHIPPED | OUTPATIENT
Start: 2023-01-17

## 2023-01-17 RX ORDER — OMEPRAZOLE 20 MG/1
CAPSULE, DELAYED RELEASE ORAL
Qty: 90 CAPSULE | Refills: 3 | OUTPATIENT
Start: 2023-01-17

## 2023-01-17 NOTE — TELEPHONE ENCOUNTER
Patient should contact his endocrinologist, Dr Stacey Hernandez for metformin refill    Omeprazole was refilled patient to make follow-up appointment in the next 2 to 3 weeks

## 2023-01-27 LAB — HBA1C MFR BLD HPLC: 8.3 %

## 2023-01-30 ENCOUNTER — TELEPHONE (OUTPATIENT)
Dept: ADMINISTRATIVE | Facility: OTHER | Age: 58
End: 2023-01-30

## 2023-01-30 NOTE — TELEPHONE ENCOUNTER
Upon review of the In Basket request we were able to locate, review, and update the patient chart as requested for Hemoglobin A1c  Any additional questions or concerns should be emailed to the Practice Liaisons via the appropriate education email address, please do not reply via In Basket      Thank you  Jenny Jacinto MA

## 2023-01-30 NOTE — TELEPHONE ENCOUNTER
----- Message from [address] sent at 1/27/2023  4:42 PM EST -----  Regarding: care gap request  01/27/23 4:42 PM    Hello, our patient attached above has had Hemoglobin A1c completed/performed  Please assist in updating the patient chart by pulling the Care Everywhere (CE) document  The date of service is 1/27/2023       Thank you,  Stacia Ross  [facility] PRIMARY CARE

## 2023-02-06 ENCOUNTER — OFFICE VISIT (OUTPATIENT)
Dept: CARDIOLOGY CLINIC | Facility: CLINIC | Age: 58
End: 2023-02-06

## 2023-02-06 VITALS
WEIGHT: 208.8 LBS | DIASTOLIC BLOOD PRESSURE: 80 MMHG | BODY MASS INDEX: 28.28 KG/M2 | HEART RATE: 73 BPM | HEIGHT: 72 IN | SYSTOLIC BLOOD PRESSURE: 138 MMHG

## 2023-02-06 DIAGNOSIS — Z72.0 TOBACCO ABUSE: ICD-10-CM

## 2023-02-06 DIAGNOSIS — I10 PRIMARY HYPERTENSION: ICD-10-CM

## 2023-02-06 DIAGNOSIS — I48.21 PERMANENT ATRIAL FIBRILLATION (HCC): Primary | ICD-10-CM

## 2023-02-06 DIAGNOSIS — E78.6 LOW HDL (UNDER 40): ICD-10-CM

## 2023-02-06 DIAGNOSIS — E78.2 MIXED HYPERLIPIDEMIA: ICD-10-CM

## 2023-02-06 DIAGNOSIS — I45.10 RBBB: ICD-10-CM

## 2023-02-06 DIAGNOSIS — I63.10 CEREBROVASCULAR ACCIDENT (CVA) DUE TO EMBOLISM OF PRECEREBRAL ARTERY (HCC): ICD-10-CM

## 2023-02-06 NOTE — PROGRESS NOTES
Cardiology Consultation     Guilherme Guerrero  [id number]  1965  [facility] CARDIOLOGY [place]  [address] [place] [id number]      1  Permanent atrial fibrillation (HCC)  POCT ECG      2  Primary hypertension        3  Low HDL (under 40)        4  Cerebrovascular accident (CVA) due to embolism of precerebral artery ([address] )        5  Tobacco abuse        6  RBBB        7  Mixed hyperlipidemia            Discussion/Summary:  Permanent atrial fibrillation   -Rate control with digoxin 250 mcg daily and Lopressor 12 5 mg twice daily  -TTE 2/12/2022 showed EF 55%, moderate LA, no significant valvular disease  - Anticoagulated on Eliquis 5 mg twice daily  Hypertension   -Continue with lisinopril 5 mg daily and Lopressor 12 5 mg twice daily  -BP well controlled today  Hyperlipidemia   -Continue with atorvastatin 40 mg daily  - Lipid profile 2/26/2022 showed total cholesterol 110, triglycerides 114, HDL 33, LDL 54  CVA  -History of embolic CVA around 4088 in the setting of atrial fibrillation  - Continue with Eliquis  - Was only on aspirin at the time of his CVA  Tobacco use  -Former smoker, reports not having any cigarettes over last few months  - Encouraged continued abstinence from tobacco  RBBB  -No evidence of bradycardia arrhythmias  - Will continue to monitor  Type 2 diabetes  -Hemoglobin A1c 8 3 on 1/27/2023  Liver cirrhosis   Hepatitis C     Follow-up in 1 year  History of Present Illness:  Guilherme Guerrero is a 61y o  year old male with a past medical history of permanent afib, hypertension, hyperlipidemia, RBBB, embolic TIA, tobacco use, type 2 diabetes, hepatitis C and liver cirrhosis  He reports feeling well today and has no complaints  Denies any episodes of chest pains, palpitations, headache, dizziness, lower extremity edema, other cardiac symptoms at rest or with exertion  Denies any episodes of bleeding, blood in his stool, or dark tarry stools      Patient Active Problem List Diagnosis   • Allergic rhinitis   • Permanent atrial fibrillation (HCC)   • Chronic hepatitis C virus infection (HCC)   • Colon polyp   • Type 2 diabetes mellitus (HCC)   • Erectile dysfunction   • Esophageal reflux   • Microalbuminuria   • Nocturia   • Palpitations   • RBBB   • Thrombocytopenia (HCC)   • Tobacco abuse   • Hand dermatitis   • Chronic pain of right ankle   • Cerebrovascular accident (CVA) due to embolism of precerebral artery (HCC)   • Obesity   • Cirrhosis of liver (HCC)   • Hypertension   • Muscle ache   • Acute pain of right shoulder   • Tinea cruris   • Cervical disc disorder with radiculopathy of mid-cervical region   • Low HDL (under 40)   • DDD (degenerative disc disease), cervical   • Cervical radiculopathy   • Onychomycosis   • Herniation of intervertebral disc at C5-C6 level   • Right arm pain   • Cervicalgia   • Other specified pre-operative examination   • Umbilical hernia without obstruction and without gangrene   • Leg cramp     Past Medical History:   Diagnosis Date   • A-fib (HCC)    • Colon polyp    • Diabetes mellitus (HCC)    • GERD (gastroesophageal reflux disease)    • Hyperlipidemia    • Hypertension    • Liver disease     Hx Hep C treated   • Neck pain    • Osteoarthritis    • TIA (transient ischemic attack)      Social History     Socioeconomic History   • Marital status: Single     Spouse name: Not on file   • Number of children: Not on file   • Years of education: Not on file   • Highest education level: Not on file   Occupational History   • Occupation: EMPLOYED   Tobacco Use   • Smoking status: Former     Packs/day: 0 20     Types: Cigarettes     Quit date: 2018     Years since quittin 4   • Smokeless tobacco: Former   Vaping Use   • Vaping Use: Never used   Substance and Sexual Activity   • Alcohol use: No   • Drug use: No     Comment: ALL SCRIPTS SAYS ACTIVE   • Sexual activity: Yes     Partners: Female     Comment: [address] [place] [address] RELATIONSHIP   Other Topics Concern   • Not on file   Social History Narrative   • Not on file     Social Determinants of Health     Financial Resource Strain: Not on file   Food Insecurity: Not on file   Transportation Needs: Not on file   Physical Activity: Not on file   Stress: Not on file   Social Connections: Not on file   Intimate Partner Violence: Not on file   Housing Stability: Not on file      Family History   Problem Relation Age of Onset   • Atrial fibrillation Mother    • Hypertension Mother    • No Known Problems Father      Past Surgical History:   Procedure Laterality Date   • ROLAN HOLE FOR SUBDURAL HEMATOMA     • COLONOSCOPY N/A 2/22/2016    Procedure: COLONOSCOPY;  Surgeon: Erick Robledo MD;  Location: [facility]; Service:    • INGUINAL HERNIA REPAIR     • ORTHOPEDIC SURGERY     • SHOULDER SURGERY Left        Current Outpatient Medications:   •  apixaban (Eliquis) 5 mg, Take 1 tablet (5 mg total) by mouth 2 (two) times a day, Disp: 180 tablet, Rfl: 3  •  atorvastatin (LIPITOR) 40 mg tablet, Take 1 tablet (40 mg total) by mouth daily, Disp: 90 tablet, Rfl: 3  •  ciclopirox (PENLAC) 8 % solution, APPLY TO AFFECTED NAILS ONCE DAILY, Disp: , Rfl:   •  clotrimazole (LOTRIMIN) 1 % cream, Apply topically 2 (two) times a day Continue for 2-4 weeks  , Disp: 60 g, Rfl: 0  •  digoxin (LANOXIN) 0 25 mg tablet, TAKE 1 TABLET BY MOUTH EVERY DAY, Disp: 90 tablet, Rfl: 3  •  econazole nitrate 1 % cream, Apply topically 2 (two) times a day, Disp: 30 g, Rfl: 0  •  glucose blood test strip, 3 (three) times a day, Disp: , Rfl:   •  Lancets (ONETOUCH ULTRASOFT) lancets, 3 (three) times a day, Disp: , Rfl:   •  lisinopril (ZESTRIL) 5 mg tablet, TAKE 1 TABLET DAILY, Disp: 90 tablet, Rfl: 3  •  metFORMIN (GLUCOPHAGE) 500 mg tablet, TAKE 1 TABLET TWICE A DAY, Disp: 180 tablet, Rfl: 3  •  metoprolol tartrate (LOPRESSOR) 25 mg tablet, Take 0 5 tablets (12 5 mg total) by mouth every 12 (twelve) hours, Disp: 180 tablet, Rfl: 0  • Milk Thistle 250 MG CAPS, Take by mouth, Disp: , Rfl:   •  omeprazole (PriLOSEC) 20 mg delayed release capsule, Take 1 capsule (20 mg total) by mouth daily, Disp: 90 capsule, Rfl: 3  •  sildenafil (VIAGRA) 50 MG tablet, Take 1 tablet (50 mg total) by mouth daily as needed for erectile dysfunction, Disp: 10 tablet, Rfl: 3  No Known Allergies      Labs:  Lab Results   Component Value Date    ALT 47 04/30/2022    AST 23 04/30/2022    BUN 14 04/30/2022    CALCIUM 9 0 04/30/2022     04/30/2022    CHOL 121 (L) 12/31/2015    CO2 34 (H) 04/30/2022    CREATININE 0 91 04/30/2022    CREAT 130 12/31/2015    HDL 33 (L) 02/26/2022    HCT 42 1 04/30/2022    HGB 14 5 04/30/2022    HGBA1C 8 3 01/27/2023     (L) 04/30/2022    K 4 3 04/30/2022    PSA 0 2 02/26/2022     01/19/2015    TRIG 114 02/26/2022    WBC 5 31 04/30/2022       Imaging: No results found  ECG: Atrial fibrillation with a PVC versus aberrantly conducted beat with a heart rate of 73 bpm, ST and T wave changes in the inferolateral leads unchanged from prior    Review of Systems:  Review of Systems   Constitutional: Negative for chills, diaphoresis, fatigue and fever  HENT: Negative for congestion  Eyes: Negative for photophobia and visual disturbance  Respiratory: Negative for chest tightness and shortness of breath  Cardiovascular: Negative for chest pain, palpitations and leg swelling  Gastrointestinal: Negative for abdominal distention, abdominal pain, diarrhea, nausea and vomiting  Genitourinary: Negative for difficulty urinating and dysuria  Musculoskeletal: Negative for arthralgias, gait problem and joint swelling  Skin: Negative for color change, pallor and rash  Neurological: Negative for dizziness, syncope, numbness and headaches  Psychiatric/Behavioral: Negative for agitation, behavioral problems and confusion           Vitals:    02/06/23 1442   BP: 138/80   Pulse: 73      Vitals:    02/06/23 1442   Weight: 94 7 kg (208 lb 12 8 oz)     Height: 6' (182 9 cm)     Physical Exam:  General appearance:  Appears stated age, alert, well appearing and in no distress  HEENT:  PERRLA, EOMI, no scleral icterus, no conjunctival pallor  NECK:  Supple, No elevated JVP, no thyromegaly, no carotid bruits  HEART:  Regular rate, irregularly irregular rhythm, no significant audible murmur  LUNGS:  Clear to auscultation bilaterally  ABDOMEN:  Soft, non-tender, positive bowel sounds, no rebound or guarding, no organomegaly   EXTREMITIES:  No edema  VASCULAR:  Normal pedal pulses   SKIN: No lesions or rashes on exposed skin  NEURO:  CN II-XII intact, no focal deficits

## 2023-02-17 ENCOUNTER — NURSE TRIAGE (OUTPATIENT)
Dept: OTHER | Facility: OTHER | Age: 58
End: 2023-02-17

## 2023-02-17 DIAGNOSIS — E11.9 TYPE 2 DIABETES MELLITUS WITHOUT COMPLICATION, UNSPECIFIED WHETHER LONG TERM INSULIN USE (HCC): ICD-10-CM

## 2023-02-17 DIAGNOSIS — R80.9 CONTROLLED TYPE 2 DIABETES MELLITUS WITH MICROALBUMINURIA, WITHOUT LONG-TERM CURRENT USE OF INSULIN (HCC): ICD-10-CM

## 2023-02-17 DIAGNOSIS — I63.519 ACUTE ISCHEMIC CEREBROVASCULAR ACCIDENT (CVA) INVOLVING MIDDLE CEREBRAL ARTERY TERRITORY (HCC): ICD-10-CM

## 2023-02-17 DIAGNOSIS — I48.21 PERMANENT ATRIAL FIBRILLATION (HCC): ICD-10-CM

## 2023-02-17 DIAGNOSIS — E11.29 CONTROLLED TYPE 2 DIABETES MELLITUS WITH MICROALBUMINURIA, WITHOUT LONG-TERM CURRENT USE OF INSULIN (HCC): ICD-10-CM

## 2023-02-18 NOTE — TELEPHONE ENCOUNTER
Medication marked as essential  Courtesy seven-day supply given per protocol  Sent to pharmacy (verified) of choice  Patient informed, verbalized understanding

## 2023-02-18 NOTE — TELEPHONE ENCOUNTER
Regarding: urgent med refill  ----- Message from Mandeep Quispe sent at 2/17/2023  7:03 PM EST -----  Medication Refill Request     Name metoprolol tartrate  Dose/Frequency 25mg Take 0 5 tablets (12 5 mg total) by mouth every 12 (twelve) hours  Quantity 180  Verified pharmacy   [facility]   Verified ordering Provider   Dr Francisca Ochoa  Does patient have enough for the next 3 days?  No

## 2023-02-18 NOTE — TELEPHONE ENCOUNTER
Reason for Disposition  • [1] Caller requesting a prescription renewal (no refills left), no triage required, AND [2] triager able to renew prescription per department policy    Answer Assessment - Initial Assessment Questions  1  DRUG NAME: "What medicine do you need to have refilled?"      Metoprolol Tartrate   2  REFILLS REMAINING: "How many refills are remaining?" (Note: The label on the medicine or pill bottle will show how many refills are remaining  If there are no refills remaining, then a renewal may be needed )   N/a  3  EXPIRATION DATE: "What is the expiration date?" (Note: The label states when the prescription will , and thus can no longer be refilled )    n/a  4  PRESCRIBING HCP: "Who prescribed it?" Reason: If prescribed by specialist, call should be referred to that group  PCP  5   SYMPTOMS: "Do you have any symptoms?"  Denies    Protocols used: [facility]

## 2023-02-23 ENCOUNTER — OFFICE VISIT (OUTPATIENT)
Dept: FAMILY MEDICINE CLINIC | Facility: CLINIC | Age: 58
End: 2023-02-23

## 2023-02-23 VITALS
BODY MASS INDEX: 28.71 KG/M2 | WEIGHT: 212 LBS | HEIGHT: 72 IN | HEART RATE: 84 BPM | DIASTOLIC BLOOD PRESSURE: 78 MMHG | TEMPERATURE: 97.7 F | SYSTOLIC BLOOD PRESSURE: 140 MMHG

## 2023-02-23 DIAGNOSIS — K74.69 OTHER CIRRHOSIS OF LIVER (HCC): ICD-10-CM

## 2023-02-23 DIAGNOSIS — E11.22 TYPE 2 DIABETES MELLITUS WITH CHRONIC KIDNEY DISEASE, WITHOUT LONG-TERM CURRENT USE OF INSULIN, UNSPECIFIED CKD STAGE (HCC): Primary | ICD-10-CM

## 2023-02-23 DIAGNOSIS — E11.9 TYPE 2 DIABETES MELLITUS WITHOUT COMPLICATION, UNSPECIFIED WHETHER LONG TERM INSULIN USE (HCC): ICD-10-CM

## 2023-02-23 DIAGNOSIS — R80.9 CONTROLLED TYPE 2 DIABETES MELLITUS WITH MICROALBUMINURIA, WITHOUT LONG-TERM CURRENT USE OF INSULIN (HCC): ICD-10-CM

## 2023-02-23 DIAGNOSIS — I63.10 CEREBROVASCULAR ACCIDENT (CVA) DUE TO EMBOLISM OF PRECEREBRAL ARTERY (HCC): ICD-10-CM

## 2023-02-23 DIAGNOSIS — E11.29 CONTROLLED TYPE 2 DIABETES MELLITUS WITH MICROALBUMINURIA, WITHOUT LONG-TERM CURRENT USE OF INSULIN (HCC): ICD-10-CM

## 2023-02-23 DIAGNOSIS — E78.6 LOW HDL (UNDER 40): ICD-10-CM

## 2023-02-23 DIAGNOSIS — I48.21 PERMANENT ATRIAL FIBRILLATION (HCC): ICD-10-CM

## 2023-02-23 DIAGNOSIS — B18.2 CHRONIC HEPATITIS C WITHOUT HEPATIC COMA (HCC): ICD-10-CM

## 2023-02-23 DIAGNOSIS — I63.519 ACUTE ISCHEMIC CEREBROVASCULAR ACCIDENT (CVA) INVOLVING MIDDLE CEREBRAL ARTERY TERRITORY (HCC): ICD-10-CM

## 2023-02-23 DIAGNOSIS — I10 PRIMARY HYPERTENSION: ICD-10-CM

## 2023-02-23 NOTE — ASSESSMENT & PLAN NOTE
Check labs  Last was a year ago  Should be done approximately every 6 months, given that he is on atorvastatin

## 2023-02-23 NOTE — ASSESSMENT & PLAN NOTE
Lab Results   Component Value Date    HGBA1C 8 3 01/27/2023   Patient's last A1c was elevated at 8 3  Endocrine increase the metformin  Recommend recheck approximately 3 months after that  Follow-up as scheduled

## 2023-02-23 NOTE — PATIENT INSTRUCTIONS
1  Type 2 diabetes mellitus with chronic kidney disease, without long-term current use of insulin, unspecified CKD stage [facility])  Assessment & Plan:    Lab Results   Component Value Date    HGBA1C 8 3 01/27/2023   Patient's last A1c was elevated at 8 3  Endocrine increase the metformin  Recommend recheck approximately 3 months after that  Follow-up as scheduled  2  Permanent atrial fibrillation [facility])  Assessment & Plan:  Patient does have a history of atrial fibrillation  Continue on Eliquis, beta-blocker  Heart rate today was reasonable  Orders:  -     metoprolol tartrate (LOPRESSOR) 25 mg tablet; Take 0 5 tablets (12 5 mg total) by mouth every 12 (twelve) hours    3  Primary hypertension  Assessment & Plan:  Blood pressure today is fairly reasonable  I would like to see the systolic a little lower  Recommend limit sodium  Continue with lisinopril and metoprolol  4  Chronic hepatitis C without hepatic coma ([facility] )  Assessment & Plan:  I would recommend follow-up with GI for hep C  No specific change at the moment  Orders:  -     Ambulatory Referral to Gastroenterology; Future    5  Cerebrovascular accident (CVA) due to embolism of precerebral artery [facility])  Assessment & Plan:  Prior CVA  Likely embolic  That was because of A-fib  Currently on anticoagulation  No changes recommended at the moment  6  Other cirrhosis of liver ([facility] )  Assessment & Plan:  Noted in the past   From hep C  Recommend follow-up with GI       7  Low HDL (under 40)  Assessment & Plan:  Check labs  Last was a year ago  Should be done approximately every 6 months, given that he is on atorvastatin  8  Acute ischemic cerebrovascular accident (CVA) involving middle cerebral artery territory ([facility] )  -     metoprolol tartrate (LOPRESSOR) 25 mg tablet; Take 0 5 tablets (12 5 mg total) by mouth every 12 (twelve) hours    9   Controlled type 2 diabetes mellitus with microalbuminuria, without long-term current use of insulin (HCC)  -     metoprolol tartrate (LOPRESSOR) 25 mg tablet; Take 0 5 tablets (12 5 mg total) by mouth every 12 (twelve) hours    10  Type 2 diabetes mellitus without complication, unspecified whether long term insulin use ([facility] )  Assessment & Plan:    Lab Results   Component Value Date    HGBA1C 8 3 01/27/2023   Patient's last A1c was elevated at 8 3  Endocrine increase the metformin  Recommend recheck approximately 3 months after that  Follow-up as scheduled  Orders:  -     metoprolol tartrate (LOPRESSOR) 25 mg tablet; Take 0 5 tablets (12 5 mg total) by mouth every 12 (twelve) hours        COVID 19 Instructions    Yessica Mackenzie was advised to limit contact with others to essential tasks such as getting food, medications, and medical care  Proper handwashing reviewed, and Hand sanitzer when washing is not available  If the patient develops symptoms of COVID 19, the patient should call the office as soon as possible  For [id number] Flu season, it is strongly recommended that Flu Vaccinations be obtained  Virtual Visits:  [place]: This works on smart phones (any phone with Internet browsing capability)  You should get a text message when the provider is ready to see you  Click on the link in the text message, and the call should start  You will need to type in your name, and allow camera and microphone access  This is HIPPA compliant, and secure  If you have not already done so, get immunized to COVID 19  We are committed to getting you vaccinated as soon as possible and will be closely following CDC and [facility]  guidelines as they are released and revised  Please refer to our COVID-19 vaccine webpage for the most up to date information on the vaccine and our distribution efforts  This site will also have the most up to date recommendations for COVID booster vaccine      Glory phillip    Call [phone] ([phone]), option 7    OUR NEW LOCATION:    [address], [facility] [address], [place], [address]  Fax: [phone]    Lab services and OB/GYN are at this location as well

## 2023-02-23 NOTE — ASSESSMENT & PLAN NOTE
Prior CVA  Likely embolic  That was because of A-fib  Currently on anticoagulation  No changes recommended at the moment

## 2023-02-23 NOTE — ASSESSMENT & PLAN NOTE
Blood pressure today is fairly reasonable  I would like to see the systolic a little lower  Recommend limit sodium  Continue with lisinopril and metoprolol

## 2023-02-23 NOTE — PROGRESS NOTES
Name: Han Burgos      : 1965      MRN: [id number]  Encounter Provider: Carla Peacock MD  Encounter Date: 2023   Encounter department: [place] [address]     1  Type 2 diabetes mellitus with chronic kidney disease, without long-term current use of insulin, unspecified CKD stage [facility])  Assessment & Plan:    Lab Results   Component Value Date    HGBA1C 8 3 2023   Patient's last A1c was elevated at 8 3  Endocrine increase the metformin  Recommend recheck approximately 3 months after that  Follow-up as scheduled  2  Permanent atrial fibrillation [facility])  Assessment & Plan:  Patient does have a history of atrial fibrillation  Continue on Eliquis, beta-blocker  Heart rate today was reasonable  Orders:  -     metoprolol tartrate (LOPRESSOR) 25 mg tablet; Take 0 5 tablets (12 5 mg total) by mouth every 12 (twelve) hours    3  Primary hypertension  Assessment & Plan:  Blood pressure today is fairly reasonable  I would like to see the systolic a little lower  Recommend limit sodium  Continue with lisinopril and metoprolol  4  Chronic hepatitis C without hepatic coma ([facility] )  Assessment & Plan:  I would recommend follow-up with GI for hep C  No specific change at the moment  Orders:  -     Ambulatory Referral to Gastroenterology; Future    5  Cerebrovascular accident (CVA) due to embolism of precerebral artery [facility])  Assessment & Plan:  Prior CVA  Likely embolic  That was because of A-fib  Currently on anticoagulation  No changes recommended at the moment  6  Other cirrhosis of liver ([facility] )  Assessment & Plan:  Noted in the past   From hep C  Recommend follow-up with GI       7  Low HDL (under 40)  Assessment & Plan:  Check labs  Last was a year ago  Should be done approximately every 6 months, given that he is on atorvastatin        8  Acute ischemic cerebrovascular accident (CVA) involving middle cerebral artery territory [facility])  - metoprolol tartrate (LOPRESSOR) 25 mg tablet; Take 0 5 tablets (12 5 mg total) by mouth every 12 (twelve) hours    9  Controlled type 2 diabetes mellitus with microalbuminuria, without long-term current use of insulin ([facility])  -     metoprolol tartrate (LOPRESSOR) 25 mg tablet; Take 0 5 tablets (12 5 mg total) by mouth every 12 (twelve) hours    10  Type 2 diabetes mellitus without complication, unspecified whether long term insulin use ([email or web address] )  Assessment & Plan:    Lab Results   Component Value Date    HGBA1C 8 3 01/27/2023   Patient's last A1c was elevated at 8 3  Endocrine increase the metformin  Recommend recheck approximately 3 months after that  Follow-up as scheduled  Orders:  -     metoprolol tartrate (LOPRESSOR) 25 mg tablet; Take 0 5 tablets (12 5 mg total) by mouth every 12 (twelve) hours           Subjective      Chief Complaint   Patient presents with   • Diabetes     6 month f/u  Foot exam added to note  Patient is here for multiple issues  Denies any current problems or issues  He did go to endocrinology  Endocrine recommended to increase metformin to 1000 mg twice daily  Recheck was recommended for 6 months from endocrine  CVA: Patient did prior have CVA  On Lipitor 40 for secondary prevention  Also on lisinopril for blood pressure  Hypertension: On lisinopril, metoprolol tartrate  Atrial fibrillation: Currently on Eliquis  Patient did see cardiology in February  Review of Systems   Constitutional: Negative  HENT: Negative  Eyes: Negative  Respiratory: Negative  Cardiovascular: Negative  Gastrointestinal: Negative  Endocrine: Negative  Genitourinary: Negative  Musculoskeletal: Negative  Skin: Negative  Allergic/Immunologic: Negative  Neurological: Negative  Hematological: Negative  Psychiatric/Behavioral: Negative          Current Outpatient Medications on File Prior to Visit   Medication Sig   • apixaban (Eliquis) 5 mg Take 1 tablet (5 mg total) by mouth 2 (two) times a day   • atorvastatin (LIPITOR) 40 mg tablet Take 1 tablet (40 mg total) by mouth daily   • ciclopirox (PENLAC) 8 % solution APPLY TO AFFECTED NAILS ONCE DAILY   • clotrimazole (LOTRIMIN) 1 % cream Apply topically 2 (two) times a day Continue for 2-4 weeks  • digoxin (LANOXIN) 0 25 mg tablet TAKE 1 TABLET BY MOUTH EVERY DAY   • econazole nitrate 1 % cream Apply topically 2 (two) times a day   • glucose blood test strip 3 (three) times a day   • Lancets (ONETOUCH ULTRASOFT) lancets 3 (three) times a day   • lisinopril (ZESTRIL) 5 mg tablet TAKE 1 TABLET DAILY   • metFORMIN (GLUCOPHAGE) 500 mg tablet TAKE 1 TABLET TWICE A DAY   • Milk Thistle 250 MG CAPS Take by mouth   • omeprazole (PriLOSEC) 20 mg delayed release capsule Take 1 capsule (20 mg total) by mouth daily   • sildenafil (VIAGRA) 50 MG tablet Take 1 tablet (50 mg total) by mouth daily as needed for erectile dysfunction   • [DISCONTINUED] metoprolol tartrate (LOPRESSOR) 25 mg tablet Take 0 5 tablets (12 5 mg total) by mouth every 12 (twelve) hours       Objective     A1c 8 3     /78 (BP Location: Left arm, Patient Position: Sitting, Cuff Size: Adult)   Pulse 84   Temp 97 7 °F (36 5 °C) (Temporal)   Ht 6' (1 829 m)   Wt 96 2 kg (212 lb)   BMI 28 75 kg/m²   Diabetic Foot Exam    Patient's shoes and socks removed  Right Foot/Ankle   Right Foot Inspection  Skin Exam: skin normal, skin intact, callus and callus  No dry skin, no warmth, no erythema, no maceration, no abnormal color, no pre-ulcer and no ulcer  Toe Exam: ROM and strength within normal limits  Sensory   Vibration: intact  Proprioception: intact  Monofilament testing: intact    Vascular  Capillary refills: < 3 seconds  The right DP pulse is 2+  The right PT pulse is 2+  Left Foot/Ankle  Left Foot Inspection  Skin Exam: skin normal, skin intact and callus   No dry skin, no warmth, no erythema, no maceration, normal color, no pre-ulcer and no ulcer  Toe Exam: ROM and strength within normal limits  Sensory   Vibration: intact  Proprioception: intact  Monofilament testing: intact    Vascular  Capillary refills: < 3 seconds  The left DP pulse is 2+  The left PT pulse is 2+  Assign Risk Category  Deformity present  No loss of protective sensation  No weak pulses  Risk: 0        Physical Exam  Vitals and nursing note reviewed  Constitutional:       Appearance: He is well-developed  HENT:      Head: Normocephalic and atraumatic  Cardiovascular:      Rate and Rhythm: Normal rate and regular rhythm  Pulses: no weak pulses          Carotid pulses are 2+ on the right side and 2+ on the left side  Dorsalis pedis pulses are 2+ on the right side and 2+ on the left side  Posterior tibial pulses are 2+ on the right side and 2+ on the left side  Heart sounds: Normal heart sounds  No murmur heard  No friction rub  No gallop  Pulmonary:      Effort: Pulmonary effort is normal  No respiratory distress  Breath sounds: Normal breath sounds  No wheezing or rales  Musculoskeletal:      Cervical back: Normal range of motion and neck supple  Feet:    Feet:      Right foot:      Skin integrity: Callus present  No ulcer, skin breakdown, erythema, warmth or dry skin  Left foot:      Skin integrity: Callus present  No ulcer, skin breakdown, erythema, warmth or dry skin         Felix Lawson MD

## 2023-02-23 NOTE — ASSESSMENT & PLAN NOTE
Patient does have a history of atrial fibrillation  Continue on Eliquis, beta-blocker  Heart rate today was reasonable

## 2023-03-03 LAB
ALBUMIN SERPL-MCNC: 4.2 G/DL (ref 3.6–5.1)
ALBUMIN/CREAT UR: 229 MCG/MG CREAT
ALBUMIN/GLOB SERPL: 1.9 (CALC) (ref 1–2.5)
ALP SERPL-CCNC: 52 U/L (ref 35–144)
ALT SERPL-CCNC: 25 U/L (ref 9–46)
APPEARANCE UR: CLEAR
AST SERPL-CCNC: 18 U/L (ref 10–35)
BACTERIA UR QL AUTO: ABNORMAL /HPF
BASOPHILS # BLD AUTO: 29 CELLS/UL (ref 0–200)
BASOPHILS NFR BLD AUTO: 0.5 %
BILIRUB SERPL-MCNC: 1.3 MG/DL (ref 0.2–1.2)
BILIRUB UR QL STRIP: NEGATIVE
BUN SERPL-MCNC: 16 MG/DL (ref 7–25)
BUN/CREAT SERPL: ABNORMAL (CALC) (ref 6–22)
CALCIUM SERPL-MCNC: 9.3 MG/DL (ref 8.6–10.3)
CHLORIDE SERPL-SCNC: 101 MMOL/L (ref 98–110)
CHOLEST SERPL-MCNC: 118 MG/DL
CHOLEST/HDLC SERPL: 3.3 (CALC)
CO2 SERPL-SCNC: 30 MMOL/L (ref 20–32)
COLOR UR: YELLOW
CREAT SERPL-MCNC: 0.88 MG/DL (ref 0.7–1.3)
CREAT UR-MCNC: 48 MG/DL (ref 20–320)
EOSINOPHIL # BLD AUTO: 110 CELLS/UL (ref 15–500)
EOSINOPHIL NFR BLD AUTO: 1.9 %
ERYTHROCYTE [DISTWIDTH] IN BLOOD BY AUTOMATED COUNT: 14.8 % (ref 11–15)
GFR/BSA.PRED SERPLBLD CYS-BASED-ARV: 100 ML/MIN/1.73M2
GLOBULIN SER CALC-MCNC: 2.2 G/DL (CALC) (ref 1.9–3.7)
GLUCOSE SERPL-MCNC: 245 MG/DL (ref 65–99)
GLUCOSE UR QL STRIP: ABNORMAL
HBA1C MFR BLD: 8.4 % OF TOTAL HGB
HCT VFR BLD AUTO: 39.9 % (ref 38.5–50)
HDLC SERPL-MCNC: 36 MG/DL
HGB BLD-MCNC: 13.8 G/DL (ref 13.2–17.1)
HGB UR QL STRIP: NEGATIVE
HYALINE CASTS #/AREA URNS LPF: ABNORMAL /LPF
KETONES UR QL STRIP: NEGATIVE
LDLC SERPL CALC-MCNC: 65 MG/DL (CALC)
LEUKOCYTE ESTERASE UR QL STRIP: NEGATIVE
LYMPHOCYTES # BLD AUTO: 1061 CELLS/UL (ref 850–3900)
LYMPHOCYTES NFR BLD AUTO: 18.3 %
MCH RBC QN AUTO: 30.7 PG (ref 27–33)
MCHC RBC AUTO-ENTMCNC: 34.6 G/DL (ref 32–36)
MCV RBC AUTO: 88.7 FL (ref 80–100)
MICROALBUMIN UR-MCNC: 11 MG/DL
MONOCYTES # BLD AUTO: 406 CELLS/UL (ref 200–950)
MONOCYTES NFR BLD AUTO: 7 %
NEUTROPHILS # BLD AUTO: 4193 CELLS/UL (ref 1500–7800)
NEUTROPHILS NFR BLD AUTO: 72.3 %
NITRITE UR QL STRIP: NEGATIVE
NONHDLC SERPL-MCNC: 82 MG/DL (CALC)
PH UR STRIP: 7.5 [PH] (ref 5–8)
PLATELET # BLD AUTO: 121 THOUSAND/UL (ref 140–400)
PMV BLD REES-ECKER: 11.1 FL (ref 7.5–12.5)
POTASSIUM SERPL-SCNC: 4.2 MMOL/L (ref 3.5–5.3)
PROT SERPL-MCNC: 6.4 G/DL (ref 6.1–8.1)
PROT UR QL STRIP: ABNORMAL
PSA SERPL-MCNC: 0.15 NG/ML
RBC # BLD AUTO: 4.5 MILLION/UL (ref 4.2–5.8)
RBC #/AREA URNS HPF: ABNORMAL /HPF
SODIUM SERPL-SCNC: 139 MMOL/L (ref 135–146)
SP GR UR STRIP: 1.02 (ref 1–1.03)
SQUAMOUS #/AREA URNS HPF: ABNORMAL /HPF
TRIGL SERPL-MCNC: 87 MG/DL
TSH SERPL-ACNC: 2.17 MIU/L (ref 0.4–4.5)
WBC # BLD AUTO: 5.8 THOUSAND/UL (ref 3.8–10.8)
WBC #/AREA URNS HPF: ABNORMAL /HPF

## 2023-06-21 DIAGNOSIS — E11.29 CONTROLLED TYPE 2 DIABETES MELLITUS WITH MICROALBUMINURIA, WITHOUT LONG-TERM CURRENT USE OF INSULIN (HCC): ICD-10-CM

## 2023-06-21 DIAGNOSIS — I48.21 PERMANENT ATRIAL FIBRILLATION (HCC): ICD-10-CM

## 2023-06-21 DIAGNOSIS — R80.9 CONTROLLED TYPE 2 DIABETES MELLITUS WITH MICROALBUMINURIA, WITHOUT LONG-TERM CURRENT USE OF INSULIN (HCC): ICD-10-CM

## 2023-06-21 DIAGNOSIS — I63.519 ACUTE ISCHEMIC CEREBROVASCULAR ACCIDENT (CVA) INVOLVING MIDDLE CEREBRAL ARTERY TERRITORY (HCC): ICD-10-CM

## 2023-06-21 DIAGNOSIS — E11.9 TYPE 2 DIABETES MELLITUS WITHOUT COMPLICATION, UNSPECIFIED WHETHER LONG TERM INSULIN USE (HCC): ICD-10-CM

## 2023-06-21 DIAGNOSIS — R80.9 MICROALBUMINURIA: ICD-10-CM

## 2023-06-21 RX ORDER — LISINOPRIL 5 MG/1
5 TABLET ORAL DAILY
Qty: 90 TABLET | Refills: 3 | Status: SHIPPED | OUTPATIENT
Start: 2023-06-21

## 2023-06-21 NOTE — TELEPHONE ENCOUNTER
Calin Fragoso  The medicine I need is lisinopril  Dr Cardona Daily, describe this  And I'm out of lisinopril  I go to the [facility]  Raina CASTILLO  My phone number, [phone]  Someone could call me back  Thank you  Talk to someone soon  Bye  [phone]

## 2023-07-19 ENCOUNTER — NURSE TRIAGE (OUTPATIENT)
Dept: OTHER | Facility: OTHER | Age: 58
End: 2023-07-19

## 2023-07-19 DIAGNOSIS — I48.20 CHRONIC ATRIAL FIBRILLATION (HCC): ICD-10-CM

## 2023-07-20 NOTE — TELEPHONE ENCOUNTER
Regarding: Urgent Med Refill- Eliquis 5mg  ----- Message from [facility] sent at 7/19/2023  9:02 PM EDT -----  Medication Refill Request     Name Eliquis 5mg   Dose/Frequency Take 1 tablet (5 mg total) by mouth 2 (two) times a day  Quantity 180 tablet  Verified pharmacy   [ X] [id number] [place] - [address]   Verified ordering Provider    ]  Does patient have enough for the next 3 days?  Yes [ ] No [ Nika Alvarez

## 2023-07-20 NOTE — TELEPHONE ENCOUNTER
Reason for Disposition  • [1] Caller requesting a prescription renewal (no refills left), no triage required, AND [2] triager able to renew prescription per department policy    Answer Assessment - Initial Assessment Questions  1. NAME of MEDICATION: "What medicine are you calling about?"      Eliquis 5 mg PO bid    2. QUESTION: "What is your question?" (e.g., medication refill, side effect)      "I am almost out of my medication, I have two doses left"    3. PRESCRIBING HCP: "Who prescribed it?" Reason: if prescribed by specialist, call should be referred to that group. Cardiology     4.  SYMPTOMS: "Do you have any symptoms?"      Denies    Protocols used: MEDICATION QUESTION CALL-ADULT-

## 2023-07-20 NOTE — TELEPHONE ENCOUNTER
Medication Refill Request     Name Eliquis   Dose/Frequency 5 mg bid  Quantity 180  Verified pharmacy   [x]  Verified ordering Provider   [x]  Does patient have enough for the next 3 days?  Yes [] No [x]  Has two doses left

## 2023-08-01 LAB — HBA1C MFR BLD HPLC: 8 %

## 2023-08-01 PROCEDURE — 3052F HG A1C>EQUAL 8.0%<EQUAL 9.0%: CPT | Performed by: FAMILY MEDICINE

## 2023-08-23 ENCOUNTER — OFFICE VISIT (OUTPATIENT)
Dept: FAMILY MEDICINE CLINIC | Facility: CLINIC | Age: 58
End: 2023-08-23
Payer: COMMERCIAL

## 2023-08-23 VITALS
DIASTOLIC BLOOD PRESSURE: 84 MMHG | BODY MASS INDEX: 28.48 KG/M2 | TEMPERATURE: 97.8 F | SYSTOLIC BLOOD PRESSURE: 136 MMHG | OXYGEN SATURATION: 98 % | WEIGHT: 210 LBS | HEART RATE: 58 BPM

## 2023-08-23 DIAGNOSIS — M25.572 CHRONIC PAIN OF BOTH ANKLES: ICD-10-CM

## 2023-08-23 DIAGNOSIS — M25.571 CHRONIC PAIN OF BOTH ANKLES: ICD-10-CM

## 2023-08-23 DIAGNOSIS — M25.571 CHRONIC PAIN OF BOTH ANKLES: Primary | ICD-10-CM

## 2023-08-23 DIAGNOSIS — E11.22 TYPE 2 DIABETES MELLITUS WITH CHRONIC KIDNEY DISEASE, WITHOUT LONG-TERM CURRENT USE OF INSULIN, UNSPECIFIED CKD STAGE (HCC): ICD-10-CM

## 2023-08-23 DIAGNOSIS — I63.10 CEREBROVASCULAR ACCIDENT (CVA) DUE TO EMBOLISM OF PRECEREBRAL ARTERY (HCC): ICD-10-CM

## 2023-08-23 DIAGNOSIS — M15.9 PRIMARY OSTEOARTHRITIS INVOLVING MULTIPLE JOINTS: ICD-10-CM

## 2023-08-23 DIAGNOSIS — M25.572 CHRONIC PAIN OF BOTH ANKLES: Primary | ICD-10-CM

## 2023-08-23 DIAGNOSIS — G89.29 CHRONIC PAIN OF BOTH ANKLES: ICD-10-CM

## 2023-08-23 DIAGNOSIS — Z79.01 ANTICOAGULATED BY ANTICOAGULATION TREATMENT: ICD-10-CM

## 2023-08-23 DIAGNOSIS — G89.29 CHRONIC PAIN OF BOTH ANKLES: Primary | ICD-10-CM

## 2023-08-23 DIAGNOSIS — I48.21 PERMANENT ATRIAL FIBRILLATION (HCC): ICD-10-CM

## 2023-08-23 DIAGNOSIS — I10 PRIMARY HYPERTENSION: ICD-10-CM

## 2023-08-23 PROCEDURE — 99214 OFFICE O/P EST MOD 30 MIN: CPT | Performed by: FAMILY MEDICINE

## 2023-08-23 NOTE — PROGRESS NOTES
Name: Donal Juares      : 1965      MRN: [id number]  Encounter Provider: Ricardo Pollack DO  Encounter Date: 2023   Encounter department: [facility] [address]     1. Chronic ankle pain  2. DJD  Patient may use Tylenol as needed  Voltaren gel ordered  Refer to podiatry, Dr. Velma Pinto, he seen him in the past  X-rays ordered  3. Type 2 diabetes, foot exam completed patient follows with endocrinology  4. Hypertension, stable because of this no NSAID  5. Atrial fibrillation  6. Cerebrovascular disease/CVA  7  Anticoagulated  Patient is on Eliquis because of this no NSAIDs may be used  8. Return after podiatry needed  1. Chronic pain of both ankles  -     XR ankle 3+ vw left; Future; Expected date: 2023  -     XR ankle 3+ vw right; Future; Expected date: 2023  -     Ambulatory Referral to Podiatry; Future  -     Diclofenac Sodium (VOLTAREN) 1 %; Apply 4 g topically 4 (four) times a day    2. Primary osteoarthritis involving multiple joints  -     XR ankle 3+ vw left; Future; Expected date: 2023  -     XR ankle 3+ vw right; Future; Expected date: 2023  -     Ambulatory Referral to Podiatry; Future  -     Diclofenac Sodium (VOLTAREN) 1 %; Apply 4 g topically 4 (four) times a day    3. Type 2 diabetes mellitus with chronic kidney disease, without long-term current use of insulin, unspecified CKD stage [facility])  Assessment & Plan:    Lab Results   Component Value Date    HGBA1C 8.0 2023   Foot exam completed follows with endocrinology      4. Permanent atrial fibrillation [facility])  Assessment & Plan:  Eliquis because of this no NSAID      5. Primary hypertension  Assessment & Plan:  Hypertension, stable because of this no NSAID      6. Anticoagulated by anticoagulation treatment  Assessment & Plan:  On Eliquis      7.  Cerebrovascular accident (CVA) due to embolism of precerebral artery [facility])  Assessment & Plan:  On Eliquis, because of this no NSAID        BMI Counseling: Body mass index is 28.48 kg/m². The BMI is above normal. Nutrition recommendations include moderation in carbohydrate intake and reducing intake of cholesterol. Exercise recommendations include exercising 3-5 times per week. Rationale for BMI follow-up plan is due to patient being overweight or obese. Subjective      Patient is bilateral ankle pain for months. Patient works on concrete patient does have "flatfeet" has seen Dr. Darius Mack in the past and has arch supports but does not wear them. Review of Systems   Constitutional: Negative. HENT: Negative. Eyes: Negative. Respiratory: Negative. Cardiovascular: Negative. Gastrointestinal: Negative. Endocrine: Negative. Genitourinary: Negative. Musculoskeletal:        HPI   Skin: Negative. Allergic/Immunologic: Negative. Neurological: Negative. Hematological: Negative. Psychiatric/Behavioral: Negative. Current Outpatient Medications on File Prior to Visit   Medication Sig   • apixaban (Eliquis) 5 mg Take 1 tablet (5 mg total) by mouth 2 (two) times a day   • atorvastatin (LIPITOR) 40 mg tablet Take 1 tablet (40 mg total) by mouth daily   • ciclopirox (PENLAC) 8 % solution APPLY TO AFFECTED NAILS ONCE DAILY   • clotrimazole (LOTRIMIN) 1 % cream Apply topically 2 (two) times a day Continue for 2-4 weeks.    • digoxin (LANOXIN) 0.25 mg tablet TAKE 1 TABLET BY MOUTH EVERY DAY   • econazole nitrate 1 % cream Apply topically 2 (two) times a day   • glucose blood test strip 3 (three) times a day   • Lancets (ONETOUCH ULTRASOFT) lancets 3 (three) times a day   • lisinopril (ZESTRIL) 5 mg tablet Take 1 tablet (5 mg total) by mouth daily   • metFORMIN (GLUCOPHAGE) 500 mg tablet TAKE 1 TABLET TWICE A DAY   • metoprolol tartrate (LOPRESSOR) 25 mg tablet Take 0.5 tablets (12.5 mg total) by mouth every 12 (twelve) hours   • Milk Thistle 250 MG CAPS Take by mouth   • omeprazole (PriLOSEC) 20 mg delayed release capsule Take 1 capsule (20 mg total) by mouth daily   • sildenafil (VIAGRA) 50 MG tablet Take 1 tablet (50 mg total) by mouth daily as needed for erectile dysfunction       Objective     /84 (BP Location: Right arm, Patient Position: Sitting, Cuff Size: Large)   Pulse 58   Temp 97.8 °F (36.6 °C) (Temporal)   Wt 95.3 kg (210 lb)   SpO2 98%   BMI 28.48 kg/m²     Physical Exam  Vitals and nursing note reviewed. Constitutional:       Appearance: Normal appearance. HENT:      Head: Atraumatic. Mouth/Throat:      Mouth: Mucous membranes are moist.   Eyes:      General: No scleral icterus. Cardiovascular:      Rate and Rhythm: Normal rate. Pulses: Normal pulses. no weak pulses          Dorsalis pedis pulses are 2+ on the right side and 2+ on the left side. Posterior tibial pulses are 2+ on the right side and 2+ on the left side. Pulmonary:      Effort: Pulmonary effort is normal.   Musculoskeletal:         General: Deformity present. No tenderness. Right lower leg: No edema. Left lower leg: No edema. Comments: Bilateral ankles have arthritic changes   Feet:      Right foot:      Skin integrity: No ulcer, skin breakdown, erythema, warmth, callus or dry skin. Left foot:      Skin integrity: No ulcer, skin breakdown, erythema, warmth, callus or dry skin. Skin:     General: Skin is warm and dry. Findings: No erythema. Neurological:      General: No focal deficit present. Mental Status: He is alert. Psychiatric:         Mood and Affect: Mood normal.     Patient's shoes and socks removed. Right Foot/Ankle   Right Foot Inspection  Skin Exam: skin normal and skin intact. No dry skin, no warmth, no callus, no erythema, no maceration, no abnormal color, no pre-ulcer, no ulcer and no callus. Toe Exam: ROM and strength within normal limits.      Sensory   Vibration: intact  Proprioception: intact  Monofilament testing: intact    Vascular  Capillary refills: < 3 seconds  The right DP pulse is 2+. The right PT pulse is 2+. Right Toe  - Comprehensive Exam  Arch: normal  Hammertoes: absent  Claw Toes: absent  Swelling: none   Tenderness: none         Left Foot/Ankle  Left Foot Inspection  Skin Exam: skin normal and skin intact. No dry skin, no warmth, no erythema, no maceration, normal color, no pre-ulcer, no ulcer and no callus. Toe Exam: ROM and strength within normal limits. Sensory   Vibration: intact  Proprioception: intact  Monofilament testing: intact    Vascular  Capillary refills: < 3 seconds  The left DP pulse is 2+. The left PT pulse is 2+.      Left Toe  - Comprehensive Exam  Arch: normal  Hammertoes: absent  Claw toes: absent  Swelling: none   Tenderness: none             Assign Risk Category  No deformity present  No loss of protective sensation  No weak pulses  Risk: 0    Nelson Butler DO

## 2023-08-23 NOTE — PATIENT INSTRUCTIONS
Patient to use Voltaren gel 4 times daily to ankles as needed for discomfort  Patient may use oral Tylenol  Patient to follow with podiatry, Dr. Girish Garcia  Complete ankle x-rays as ordered  Return after podiatry evaluation if needed

## 2023-08-24 DIAGNOSIS — I48.21 PERMANENT ATRIAL FIBRILLATION (HCC): ICD-10-CM

## 2023-08-24 DIAGNOSIS — I63.519 ACUTE ISCHEMIC CEREBROVASCULAR ACCIDENT (CVA) INVOLVING MIDDLE CEREBRAL ARTERY TERRITORY (HCC): ICD-10-CM

## 2023-08-24 DIAGNOSIS — E11.9 TYPE 2 DIABETES MELLITUS WITHOUT COMPLICATION, UNSPECIFIED WHETHER LONG TERM INSULIN USE (HCC): ICD-10-CM

## 2023-08-24 DIAGNOSIS — E11.29 CONTROLLED TYPE 2 DIABETES MELLITUS WITH MICROALBUMINURIA, WITHOUT LONG-TERM CURRENT USE OF INSULIN (HCC): ICD-10-CM

## 2023-08-24 DIAGNOSIS — R80.9 CONTROLLED TYPE 2 DIABETES MELLITUS WITH MICROALBUMINURIA, WITHOUT LONG-TERM CURRENT USE OF INSULIN (HCC): ICD-10-CM

## 2023-08-28 ENCOUNTER — APPOINTMENT (OUTPATIENT)
Dept: RADIOLOGY | Facility: CLINIC | Age: 58
End: 2023-08-28
Payer: COMMERCIAL

## 2023-08-28 DIAGNOSIS — M15.9 PRIMARY OSTEOARTHRITIS INVOLVING MULTIPLE JOINTS: ICD-10-CM

## 2023-08-28 DIAGNOSIS — G89.29 CHRONIC PAIN OF BOTH ANKLES: ICD-10-CM

## 2023-08-28 DIAGNOSIS — M25.572 CHRONIC PAIN OF BOTH ANKLES: ICD-10-CM

## 2023-08-28 DIAGNOSIS — M25.571 CHRONIC PAIN OF BOTH ANKLES: ICD-10-CM

## 2023-08-28 PROCEDURE — 73610 X-RAY EXAM OF ANKLE: CPT

## 2023-08-29 ENCOUNTER — TELEPHONE (OUTPATIENT)
Dept: FAMILY MEDICINE CLINIC | Facility: CLINIC | Age: 58
End: 2023-08-29

## 2023-08-29 DIAGNOSIS — G89.29 CHRONIC PAIN OF BOTH ANKLES: Primary | ICD-10-CM

## 2023-08-29 DIAGNOSIS — M25.571 CHRONIC PAIN OF BOTH ANKLES: Primary | ICD-10-CM

## 2023-08-29 DIAGNOSIS — E11.29 CONTROLLED TYPE 2 DIABETES MELLITUS WITH MICROALBUMINURIA, WITHOUT LONG-TERM CURRENT USE OF INSULIN (HCC): ICD-10-CM

## 2023-08-29 DIAGNOSIS — R80.9 CONTROLLED TYPE 2 DIABETES MELLITUS WITH MICROALBUMINURIA, WITHOUT LONG-TERM CURRENT USE OF INSULIN (HCC): ICD-10-CM

## 2023-08-29 DIAGNOSIS — M25.572 CHRONIC PAIN OF BOTH ANKLES: Primary | ICD-10-CM

## 2023-09-01 ENCOUNTER — TELEPHONE (OUTPATIENT)
Dept: FAMILY MEDICINE CLINIC | Facility: CLINIC | Age: 58
End: 2023-09-01

## 2023-09-01 NOTE — TELEPHONE ENCOUNTER
Please select an alternative drug from the choices provided and send in a new prescription for that drug     IBUPROFEN TABS   IBUPROFEN (IBU) TABS   IBUPROFEN TABS   DICLOFENAC SODIUM TABS DICLOFENAC SODIUM DR TABS DICLOFENAC SODIUM DR TABS MELOXICAM TABS 7.5MG  MELOXICAM TABS 15MG       Has the patient tried two different prescription-strength step 1 non-steroidal anti-inflammatory drugs (NSAIDs) for the current condition? Step 1 NSAIDs include: Cataflam, diclofenac potassium 50 mg, diclofenac potassium 25 mg capsules, diclofenac sodium(IR and ER), diclofenac sodium and misoprostol, diclofenac sodium topical solution 1.5%, etodolac (IR and ER), flurbiprofen, ibuprofen, indomethacin (IR and ER), ketoprofen IR 50 mg and 75mg, ketorolac (tablets), meclofenamate, mefenamic acid, meloxicam tablets, nabumetone, naproxen (generics for Naprelan and Naproxen Suspension are NOT step 1), oxaprozin, piroxicam, sulindac, and tolmetin 200 mg. PLEASE NOTE: Celecoxib is accepted as a step 1 NSAID. Over-the-counter (OTC) NSAIDs count as alternatives when the patient used prescription-strength doses. OTC dose of ibuprofen (Advil) = 200 mg; therefore a prescription-strength dose would be a dose greater than 200 mg per dose. OTC dose of naproxen sodium (Aleve) = 220 mg; therefore a prescription-strength dose would be a dose greater than 220 mg per dose. Outcome   Approved today  [id number];  Status:Approved; Review Type:Prior Auth; Coverage Start Date:08/02/2023;   Coverage End Date:08/31/2024

## 2023-09-27 DIAGNOSIS — I48.21 PERMANENT ATRIAL FIBRILLATION (HCC): ICD-10-CM

## 2023-09-27 DIAGNOSIS — I48.20 CHRONIC ATRIAL FIBRILLATION (HCC): ICD-10-CM

## 2023-09-27 PROBLEM — Z00.00 HEALTHCARE MAINTENANCE: Status: ACTIVE | Noted: 2023-09-27

## 2023-09-27 PROBLEM — Z12.5 SCREENING FOR PROSTATE CANCER: Status: ACTIVE | Noted: 2023-09-27

## 2023-09-27 RX ORDER — DIGOXIN 250 MCG
250 TABLET ORAL DAILY
Qty: 90 TABLET | Refills: 3 | Status: SHIPPED | OUTPATIENT
Start: 2023-09-27 | End: 2023-10-06 | Stop reason: SDUPTHER

## 2023-10-02 NOTE — ASSESSMENT & PLAN NOTE
Eliquis because of this no NSAID
Hypertension, stable because of this no NSAID
Lab Results   Component Value Date    HGBA1C 8.0 08/01/2023   Foot exam completed follows with endocrinology
On Eliquis
On Eliquis, because of this no NSAID
yes

## 2023-10-06 DIAGNOSIS — R80.9 CONTROLLED TYPE 2 DIABETES MELLITUS WITH MICROALBUMINURIA, WITHOUT LONG-TERM CURRENT USE OF INSULIN: ICD-10-CM

## 2023-10-06 DIAGNOSIS — I48.21 PERMANENT ATRIAL FIBRILLATION (HCC): ICD-10-CM

## 2023-10-06 DIAGNOSIS — E11.9 TYPE 2 DIABETES MELLITUS WITHOUT COMPLICATION, UNSPECIFIED WHETHER LONG TERM INSULIN USE (HCC): ICD-10-CM

## 2023-10-06 DIAGNOSIS — E11.29 CONTROLLED TYPE 2 DIABETES MELLITUS WITH MICROALBUMINURIA, WITHOUT LONG-TERM CURRENT USE OF INSULIN: ICD-10-CM

## 2023-10-06 DIAGNOSIS — I63.519 ACUTE ISCHEMIC CEREBROVASCULAR ACCIDENT (CVA) INVOLVING MIDDLE CEREBRAL ARTERY TERRITORY (HCC): ICD-10-CM

## 2023-10-06 DIAGNOSIS — R80.9 MICROALBUMINURIA: ICD-10-CM

## 2023-10-06 RX ORDER — DIGOXIN 250 MCG
250 TABLET ORAL DAILY
Qty: 90 TABLET | Refills: 3 | Status: SHIPPED | OUTPATIENT
Start: 2023-10-06

## 2023-10-06 RX ORDER — LISINOPRIL 5 MG/1
5 TABLET ORAL DAILY
Qty: 90 TABLET | Refills: 3 | Status: SHIPPED | OUTPATIENT
Start: 2023-10-06

## 2023-10-06 NOTE — TELEPHONE ENCOUNTER
Hello, this is [facility]. We are following up on a prescription request we sent to Doctor Ashwini Wing on September the 25th for patient Pam Gongora. That's Deep Easton, date of birth March 10th, 1965. Mr. Deep Easton is requesting a 90 day prescription with up to three refills on 2 medications, the metoprolol tartrate tablet 25 milligram and the lisinopril tablet 5 milligram. The patient does have a limited supply of his medication and is at risk of running out of medication. If we are unable to obtain the necessary approval for prescription today, please describe the prescriptions to [address] [place] [address] Delivery at [facility]. [place], [address]. You may also fax the prescriptions by returning the fax form that we sent to your office to [phone]. And should you have any questions, please feel free to give us a call at [phone].

## 2023-10-10 DIAGNOSIS — I48.21 PERMANENT ATRIAL FIBRILLATION (HCC): Primary | ICD-10-CM

## 2023-10-10 RX ORDER — DIGOXIN 250 MCG
250 TABLET ORAL DAILY
Qty: 15 TABLET | Refills: 1 | Status: SHIPPED | OUTPATIENT
Start: 2023-10-10

## 2023-10-11 ENCOUNTER — VBI (OUTPATIENT)
Dept: ADMINISTRATIVE | Facility: OTHER | Age: 58
End: 2023-10-11

## 2023-10-12 ENCOUNTER — TELEPHONE (OUTPATIENT)
Dept: FAMILY MEDICINE CLINIC | Facility: CLINIC | Age: 58
End: 2023-10-12

## 2023-10-12 NOTE — TELEPHONE ENCOUNTER
Arsen Bridges had entered our practice addressing he needed a form to be sign by Dr. Terrence Licea the paper is for the patients parking placard application that the doctor sections that need to be filled to be completed the day of his appointment. The form will be inside of Ava Wilhelm folder, thank you.

## 2023-10-19 ENCOUNTER — OFFICE VISIT (OUTPATIENT)
Dept: FAMILY MEDICINE CLINIC | Facility: CLINIC | Age: 58
End: 2023-10-19

## 2023-10-19 VITALS
BODY MASS INDEX: 28.09 KG/M2 | HEIGHT: 72 IN | RESPIRATION RATE: 16 BRPM | HEART RATE: 79 BPM | TEMPERATURE: 96.4 F | WEIGHT: 207.4 LBS | SYSTOLIC BLOOD PRESSURE: 120 MMHG | DIASTOLIC BLOOD PRESSURE: 64 MMHG

## 2023-10-19 DIAGNOSIS — I63.10 CEREBROVASCULAR ACCIDENT (CVA) DUE TO EMBOLISM OF PRECEREBRAL ARTERY (HCC): ICD-10-CM

## 2023-10-19 DIAGNOSIS — N18.1 TYPE 2 DIABETES MELLITUS WITH STAGE 1 CHRONIC KIDNEY DISEASE, WITHOUT LONG-TERM CURRENT USE OF INSULIN: ICD-10-CM

## 2023-10-19 DIAGNOSIS — E11.22 TYPE 2 DIABETES MELLITUS WITH STAGE 1 CHRONIC KIDNEY DISEASE, WITHOUT LONG-TERM CURRENT USE OF INSULIN: ICD-10-CM

## 2023-10-19 DIAGNOSIS — I10 PRIMARY HYPERTENSION: ICD-10-CM

## 2023-10-19 DIAGNOSIS — M50.222 HERNIATION OF INTERVERTEBRAL DISC AT C5-C6 LEVEL: Primary | ICD-10-CM

## 2023-10-19 DIAGNOSIS — I48.0 PAROXYSMAL ATRIAL FIBRILLATION (HCC): ICD-10-CM

## 2023-10-19 NOTE — PATIENT INSTRUCTIONS
Type 2 Diabetes Management for Adults   AMBULATORY CARE:   Type 2 diabetes  is a disease that affects how your body uses glucose (sugar). Either your body cannot make enough insulin, or it cannot use the insulin correctly. It is important to keep diabetes controlled to prevent damage to your heart, blood vessels, and other organs. Management will help you feel well and enjoy your daily activities. Your diabetes care team providers can help you make a plan to fit diabetes care into your schedule. Your plan can change over time to fit your needs and your family's needs. Have someone call your local emergency number (911 in the [address]) if:   You cannot be woken. You have signs of diabetic ketoacidosis:     confusion, fatigue    vomiting    rapid heartbeat    fruity smelling breath    extreme thirst    dry mouth and skin    You have any of the following signs of a heart attack:      Squeezing, pressure, or pain in your chest    You may  also have any of the following:     Discomfort or pain in your back, neck, jaw, stomach, or arm    Shortness of breath    Nausea or vomiting    Lightheadedness or a sudden cold sweat    You have any of the following signs of a stroke:      Numbness or drooping on one side of your face     Weakness in an arm or leg    Confusion or difficulty speaking    Dizziness, a severe headache, or vision loss    Call your doctor or diabetes care team provider if:   You have a sore or wound that will not heal.    You have a change in the amount you urinate. Your blood sugar levels are higher than your target goals. You often have lower blood sugar levels than your target goals. Your skin is red, dry, warm, or swollen. You have trouble coping with diabetes, or you feel anxious or depressed. You have trouble following any part of your care plan, such as your meal plan. You have questions or concerns about your condition or care.     What you need to know about high blood sugar levels:  High blood sugar levels may not cause any symptoms. You may feel more thirsty or urinate more often than usual. Over time, high blood sugar levels can damage your nerves, blood vessels, tissues, and organs. The following can increase your blood sugar levels:  Large meals or large amounts of carbohydrates at one time    Less physical activity    Stress    Illness    A lower dose of diabetes medicine or insulin, or a late dose    What you need to know about low blood sugar levels:  Symptoms include feeling shaky, dizzy, irritable, or confused. You can prevent symptoms by keeping your blood sugar levels from going too low. Treat a low blood sugar level right away:      Drink 4 ounces of juice or have 1 tube of glucose gel. Check your blood sugar level again 10 to 15 minutes later. When the level goes back to normal, eat a meal or snack to prevent another decrease. Keep glucose gel, raisins, or hard candy with you at all times to treat a low blood sugar level. Your blood sugar level can get too low if you take diabetes medicine or insulin and do not eat enough food. If you use insulin, check your blood sugar level before you exercise. If your blood sugar level is below 100 mg/dL, eat 4 crackers or 2 ounces of raisins, or drink 4 ounces of juice. Check your level every 30 minutes if you exercise longer than 1 hour. You may need a snack during or after exercise. What you can do to manage your blood sugar levels:   Check your blood sugar levels as directed and as needed. Several items are available to use to check your levels. You may need to check by testing a drop of blood in a glucose monitor. You may instead be given a continuous glucose monitoring (CGM) device. The device is worn at all times. The CGM checks your blood sugar level every 5 minutes. It sends results to an electronic device such as a smart phone. A CGM can be used with or without an insulin pump.  You and your diabetes care team providers will decide on the best method for you. The goal for blood sugar levels before meals  is between 80 and 130 mg/dL and 2 hours after eating  is lower than 180 mg/dL. Make healthy food choices. Work with a dietitian to create a meal plan that works for you and your schedule. A dietitian can help you learn how to eat the right amount of carbohydrates (sugar and starchy foods) during your meals and snacks. Examples of carbohydrates are breads, cereals, rice, pasta, fruit, low-fat dairy, and sweets. Carbohydrates can raise your blood sugar level if you eat too many at one time. Eat high-fiber foods as directed. Fiber helps improve blood sugar levels. Fiber also lowers your risk for heart disease and other problems diabetes can cause. Examples of high-fiber foods include vegetables, whole-grain bread, and beans such as bermeo beans. Your dietitian can tell you how much fiber to have each day. Get regular physical activity. Physical activity can help you get to your target blood sugar level goal and manage your weight. Get at least 150 minutes of moderate to vigorous aerobic physical activity each week. Resistance training, such as lifting weights, should be done 3 times each week. Do not miss more than 2 days of physical activity in a row. Do not sit longer than 30 minutes at a time. Your healthcare provider can help you create an activity plan. The plan can include the best activities for you and can help you build your strength and endurance. Maintain a healthy weight. Ask your team what a healthy weight is for you. A healthy weight can help you control diabetes and prevent heart disease. Ask your team to help you create a weight-loss plan, if needed. Even a loss of 3% to 7% of your excess body weight can help make a difference in managing diabetes. Your team will help you set a weight-loss goal, such as 10 to 15 pounds, or 5% of your extra weight. Together you and your team can set manageable weight-loss goals. Take your diabetes medicine or insulin as directed. You may need diabetes medicine, insulin, or both to help control your blood sugar levels. Your healthcare provider will teach you how and when to take your diabetes medicine or insulin. You will also be taught about side effects oral diabetes medicine can cause. Insulin may be injected or given through a pump or pen. You and your providers will decide on the best method for you: An insulin pump  is an implanted device that gives your insulin 24 hours a day. An insulin pump prevents the need for multiple insulin injections in a day. An insulin pen  is a device prefilled with the right amount of insulin. You and your family members will be taught how to draw up and give insulin  if this is the best method for you. Your providers will also teach you how to dispose of needles and syringes. You will learn how much insulin you need  and when to give it. You will be taught when not to give insulin. You will also be taught what to do if your blood sugar level drops too low. This may happen if you take insulin and do not eat the right amount of carbohydrates. More ways to manage type 2 diabetes:   Wear medical alert identification. Wear medical alert jewelry or carry a card that says you have diabetes. Ask your provider where to get these items. Do not smoke. Nicotine and other chemicals in cigarettes and cigars can cause lung and blood vessel damage. It also makes it more difficult to manage your diabetes. Ask your provider for information if you currently smoke and need help to quit. Do not use e-cigarettes or smokeless tobacco in place of cigarettes or to help you quit. They still contain nicotine. Check your feet each day for cuts, scratches, calluses, or other wounds. Look for redness and swelling, and feel for warmth. Wear shoes that fit well.  Check your shoes for rocks or other objects that can hurt your feet. Do not walk barefoot or wear shoes without socks. Wear cotton socks to help keep your feet dry. Ask about vaccines you may need. You have a higher risk for serious illness if you get the flu, pneumonia, COVID-19, or hepatitis. Ask your provider if you should get vaccines to prevent these or other diseases, and when to get the vaccines. Talk to your provider if you become stressed about diabetes care. Sometimes being able to fit diabetes care into your life can cause increased stress. The stress can cause you not to take care of yourself properly. Your provider can help by offering tips about self-care. A mental health provider can listen and offer help with self-care issues. Other types of counseling can help you make nutrition or physical activity changes. Have your A1c checked as directed. Your provider may check your A1c every 3 months, or 2 times each year if your diabetes is controlled. An A1c test shows the average amount of sugar in your blood over the past 2 to 3 months. Your provider will tell you what your A1c level should be. Have screening tests as directed. Your provider may recommend screening for complications of diabetes and other conditions that may develop. Some screenings may begin right away and some may happen within the first 5 years of diagnosis:    Examples of diabetes complications  include kidney problems, high cholesterol, high blood pressure, blood vessel problems, eye problems, and sleep apnea. You may be screened for a low vitamin B level  if you take oral diabetes medicine for a long time. You may be screened for polycystic ovarian syndrome (PCOS)  if you are of childbearing age. Follow up with your doctor or diabetes care team providers as directed: You may need to have blood tests done before your follow-up visit. The test results will show if changes need to be made in your treatment or self-care. Talk to your provider if you cannot afford your medicine. Write down your questions so you remember to ask them during your visits. © Copyright [employer] 2023 Information is for End User's use only and may not be sold, redistributed or otherwise used for commercial purposes. The above information is an  only. It is not intended as medical advice for individual conditions or treatments. Talk to your doctor, nurse or pharmacist before following any medical regimen to see if it is safe and effective for you. 1. Herniation of intervertebral disc at C5-C6 level  Assessment & Plan:  Patient does have herniation of C5-6. Some radicular symptoms. Does cause some problems with him for walking. Even though this is cervical, he still has issues with walking distance. 2. Cerebrovascular accident (CVA) due to embolism of precerebral artery [facility])  Assessment & Plan:  Prior CVA. On Eliquis. Likely secondary to A-fib. Continue Eliquis for anticoagulation to decrease the risk of CVA. 3. Paroxysmal atrial fibrillation [facility])  Assessment & Plan:  Patient is currently on Eliquis for anticoagulation for his A-fib, as well as metoprolol for heart rate control. Continue current medications, follow-up with cardiology. Also on digoxin. Check dig level with blood work. Orders:  -     Comprehensive metabolic panel; Future; Expected date: 10/19/2023  -     Digoxin level; Future    4. Type 2 diabetes mellitus with stage 1 chronic kidney disease, without long-term current use of insulin   Assessment & Plan:    Lab Results   Component Value Date    HGBA1C 8.0 08/01/2023   Patient does have diabetes. His last in August showed an A1c of 8. Recommend recheck. Follow-up with endocrine recommended as well. Patient reports he does see Dr. Brianda Lugo at [facility] THE [facility]. Ordered labs so he can complete those, and then follow-up with endocrine. Orders:  -     Albumin / creatinine urine ratio;  Future; Expected date: 10/19/2023  -     Comprehensive metabolic panel; Future; Expected date: 10/19/2023  -     Hemoglobin A1C; Future; Expected date: 10/19/2023  -     Ambulatory Referral to Endocrinology; Future    5. Primary hypertension  Assessment & Plan:  Blood pressure doing quite well. Continue current treatment. No change. Follows with cardiology. COVID 19 Instructions    Alexia Martin was advised to limit contact with others to essential tasks such as getting food, medications, and medical care. Proper handwashing reviewed, and Hand sanitzer when washing is not available. If the patient develops symptoms of COVID 19, the patient should call the office as soon as possible. It is strongly recommended that Flu Vaccinations be obtained. Virtual Visits:  Catia: This works on smart phones (any phone with Internet browsing capability). You should get a text message when the provider is ready to see you. Click on the link in the text message, and the call should start. You will need to type in your name, and allow camera and microphone access. This is HIPPA compliant, and secure. If you have not already done so, get immunized to COVID 19. We are committed to getting you vaccinated as soon as possible and will be closely following CDC and [facility] guidelines as they are released and revised. Please refer to our COVID-19 vaccine webpage for the most up to date information on the vaccine and our distribution efforts. This site will also have the most up to date recommendations for COVID booster vaccine. [email or web address]    Call [phone] ([phone]), option 7    You can also visit [facility]. to find vaccines in your area.     OUR LOCATION:    [facility]  [address] [facility], [address] [place] [address], [address] [facility]  Fax: [phone]    Lab services, Rheumatology, and OB/GYN are at this location as well.

## 2023-10-19 NOTE — ASSESSMENT & PLAN NOTE
Patient does have herniation of C5-6. Some radicular symptoms. Does cause some problems with him for walking. Even though this is cervical, he still has issues with walking distance.

## 2023-10-19 NOTE — ASSESSMENT & PLAN NOTE
Patient is currently on Eliquis for anticoagulation for his A-fib, as well as metoprolol for heart rate control. Continue current medications, follow-up with cardiology. Also on digoxin. Check dig level with blood work.

## 2023-10-19 NOTE — ASSESSMENT & PLAN NOTE
Lab Results   Component Value Date    HGBA1C 8.0 08/01/2023   Patient does have diabetes. His last in August showed an A1c of 8. Recommend recheck. Follow-up with endocrine recommended as well. Patient reports he does see Dr. Beatriz Hilliard at [facility] AT THE [facility]. Ordered labs so he can complete those, and then follow-up with endocrine.

## 2023-10-19 NOTE — ASSESSMENT & PLAN NOTE
Prior CVA. On Eliquis. Likely secondary to A-fib. Continue Eliquis for anticoagulation to decrease the risk of CVA.

## 2023-10-19 NOTE — PROGRESS NOTES
Name: Saúl Rosario      : 1965      MRN: [id number]  Encounter Provider: Claudia Shepard MD  Encounter Date: 10/19/2023   Encounter department: [place] [address] [address] [facility]     1. Herniation of intervertebral disc at C5-C6 level  Assessment & Plan:  Patient does have herniation of C5-6. Some radicular symptoms. Does cause some problems with him for walking. Even though this is cervical, he still has issues with walking distance. 2. Cerebrovascular accident (CVA) due to embolism of precerebral artery [facility])  Assessment & Plan:  Prior CVA. On Eliquis. Likely secondary to A-fib. Continue Eliquis for anticoagulation to decrease the risk of CVA. 3. Paroxysmal atrial fibrillation [facility])  Assessment & Plan:  Patient is currently on Eliquis for anticoagulation for his A-fib, as well as metoprolol for heart rate control. Continue current medications, follow-up with cardiology. Also on digoxin. Check dig level with blood work. Orders:  -     Comprehensive metabolic panel; Future; Expected date: 10/19/2023  -     Digoxin level; Future    4. Type 2 diabetes mellitus with stage 1 chronic kidney disease, without long-term current use of insulin   Assessment & Plan:    Lab Results   Component Value Date    HGBA1C 8.0 2023   Patient does have diabetes. His last in August showed an A1c of 8. Recommend recheck. Follow-up with endocrine recommended as well. Patient reports he does see Dr. Puneet Garcia at [facility] THE [facility]. Ordered labs so he can complete those, and then follow-up with endocrine. Orders:  -     Albumin / creatinine urine ratio; Future; Expected date: 10/19/2023  -     Comprehensive metabolic panel; Future; Expected date: 10/19/2023  -     Hemoglobin A1C; Future; Expected date: 10/19/2023  -     Ambulatory Referral to Endocrinology; Future    5. Primary hypertension  Assessment & Plan:  Blood pressure doing quite well. Continue current treatment.   No change. Follows with cardiology. Subjective      Chief Complaint   Patient presents with   • Follow-up     Patient in office for form completion       Patient is here because he wishes to go over his arthritis. He does have significant issues when he goes to walk. Cannot walk for a longer period of time without getting discomfort. Maxi Caldwelldinora from doing most things. Because of that, he would like to have a hang tag for handicap. CVA: Noted previously. Continues on anticoagulation. Review of Systems   Constitutional:  Negative for appetite change and fever. Respiratory:  Negative for chest tightness and shortness of breath. Cardiovascular:  Negative for chest pain, palpitations and leg swelling. Gastrointestinal:  Negative for abdominal pain. Genitourinary:  Negative for difficulty urinating. Musculoskeletal:  Positive for gait problem. Negative for arthralgias and myalgias. Skin:  Negative for wound. Neurological:  Negative for dizziness, light-headedness and headaches. Psychiatric/Behavioral:  Negative for dysphoric mood and sleep disturbance. The patient is not nervous/anxious. Current Outpatient Medications on File Prior to Visit   Medication Sig   • apixaban (Eliquis) 5 mg Take 1 tablet (5 mg total) by mouth 2 (two) times a day   • atorvastatin (LIPITOR) 40 mg tablet Take 1 tablet (40 mg total) by mouth daily   • ciclopirox (PENLAC) 8 % solution APPLY TO AFFECTED NAILS ONCE DAILY   • clotrimazole (LOTRIMIN) 1 % cream Apply topically 2 (two) times a day Continue for 2-4 weeks.    • Diclofenac Sodium (VOLTAREN) 1 % APPLY 4 GRAMS TOPICALLY 4 TIMES A DAY   • digoxin (LANOXIN) 0.25 mg tablet Take 1 tablet (250 mcg total) by mouth daily   • digoxin (LANOXIN) 0.25 mg tablet Take 1 tablet (250 mcg total) by mouth daily   • econazole nitrate 1 % cream Apply topically 2 (two) times a day   • glucose blood test strip 3 (three) times a day   • Lancets (ONETOUCH ULTRASOFT) lancets 3 (three) times a day   • lisinopril (ZESTRIL) 5 mg tablet Take 1 tablet (5 mg total) by mouth daily   • metFORMIN (GLUCOPHAGE) 500 mg tablet TAKE 1 TABLET TWICE A DAY   • metoprolol tartrate (LOPRESSOR) 25 mg tablet Take 0.5 tablets (12.5 mg total) by mouth every 12 (twelve) hours   • Milk Thistle 250 MG CAPS Take by mouth   • omeprazole (PriLOSEC) 20 mg delayed release capsule Take 1 capsule (20 mg total) by mouth daily   • sildenafil (VIAGRA) 50 MG tablet Take 1 tablet (50 mg total) by mouth daily as needed for erectile dysfunction       Objective     /64 (BP Location: Left arm, Patient Position: Sitting, Cuff Size: Adult)   Pulse 79   Temp (!) 96.4 °F (35.8 °C) (Temporal)   Resp 16   Ht 6' (1.829 m)   Wt 94.1 kg (207 lb 6.4 oz)   BMI 28.13 kg/m²     Physical Exam  Vitals and nursing note reviewed. Constitutional:       Appearance: He is well-developed. HENT:      Head: Normocephalic and atraumatic. Cardiovascular:      Rate and Rhythm: Normal rate and regular rhythm. Pulses:           Carotid pulses are 2+ on the right side and 2+ on the left side. Heart sounds: Normal heart sounds. No murmur heard. No friction rub. No gallop. Pulmonary:      Effort: Pulmonary effort is normal. No respiratory distress. Breath sounds: Normal breath sounds. No wheezing or rales. Musculoskeletal:      Cervical back: Normal range of motion and neck supple.        Indira Saravia MD

## 2023-10-20 ENCOUNTER — TELEPHONE (OUTPATIENT)
Dept: ENDOCRINOLOGY | Facility: CLINIC | Age: 58
End: 2023-10-20

## 2023-11-03 ENCOUNTER — VBI (OUTPATIENT)
Dept: ADMINISTRATIVE | Facility: OTHER | Age: 58
End: 2023-11-03

## 2023-11-10 LAB
ALBUMIN SERPL-MCNC: 4.4 G/DL (ref 3.6–5.1)
ALBUMIN/GLOB SERPL: 2 (CALC) (ref 1–2.5)
ALP SERPL-CCNC: 48 U/L (ref 35–144)
ALT SERPL-CCNC: 23 U/L (ref 9–46)
AST SERPL-CCNC: 18 U/L (ref 10–35)
BILIRUB SERPL-MCNC: 1.5 MG/DL (ref 0.2–1.2)
BUN SERPL-MCNC: 15 MG/DL (ref 7–25)
BUN/CREAT SERPL: ABNORMAL (CALC) (ref 6–22)
CALCIUM SERPL-MCNC: 9.2 MG/DL (ref 8.6–10.3)
CHLORIDE SERPL-SCNC: 100 MMOL/L (ref 98–110)
CO2 SERPL-SCNC: 32 MMOL/L (ref 20–32)
CREAT SERPL-MCNC: 0.84 MG/DL (ref 0.7–1.3)
GFR/BSA.PRED SERPLBLD CYS-BASED-ARV: 100 ML/MIN/1.73M2
GLOBULIN SER CALC-MCNC: 2.2 G/DL (CALC) (ref 1.9–3.7)
GLUCOSE SERPL-MCNC: 229 MG/DL (ref 65–99)
HBA1C MFR BLD: 8 % OF TOTAL HGB
POTASSIUM SERPL-SCNC: 4.3 MMOL/L (ref 3.5–5.3)
PROT SERPL-MCNC: 6.6 G/DL (ref 6.1–8.1)
SODIUM SERPL-SCNC: 140 MMOL/L (ref 135–146)

## 2023-11-16 ENCOUNTER — OFFICE VISIT (OUTPATIENT)
Dept: FAMILY MEDICINE CLINIC | Facility: CLINIC | Age: 58
End: 2023-11-16
Payer: COMMERCIAL

## 2023-11-16 VITALS
OXYGEN SATURATION: 97 % | SYSTOLIC BLOOD PRESSURE: 148 MMHG | WEIGHT: 208.13 LBS | TEMPERATURE: 97.5 F | HEART RATE: 88 BPM | BODY MASS INDEX: 28.23 KG/M2 | DIASTOLIC BLOOD PRESSURE: 80 MMHG

## 2023-11-16 DIAGNOSIS — I10 PRIMARY HYPERTENSION: ICD-10-CM

## 2023-11-16 DIAGNOSIS — M25.552 LEFT HIP PAIN: Primary | ICD-10-CM

## 2023-11-16 PROCEDURE — 99214 OFFICE O/P EST MOD 30 MIN: CPT | Performed by: NURSE PRACTITIONER

## 2023-11-16 NOTE — ASSESSMENT & PLAN NOTE
Patient's blood pressure was not at goal in the office today however, he was having acute hip pain so I feel this was a transient raise in his blood pressure. Patient's blood pressure can be reassessed at his next office visit.

## 2023-11-16 NOTE — ASSESSMENT & PLAN NOTE
Patient's symptoms are consistent with osteoarthritis of the left hip. Patient is currently taking Eliquis so I did reinforce the importance of avoiding NSAIDs. He was advised that he can continue to take up to 3000 mg of Tylenol in a 24-hour period as needed. He was also prescribed Voltaren gel to be used as needed up to 4 times per day on the affected hip.

## 2023-11-16 NOTE — PROGRESS NOTES
Name: Hillary Allison      : 1965      MRN: [id number]  Encounter Provider: NISHA Koch  Encounter Date: 2023   Encounter department: [place] [address] [place]     1. Left hip pain  Assessment & Plan:  Patient's symptoms are consistent with osteoarthritis of the left hip. Patient is currently taking Eliquis so I did reinforce the importance of avoiding NSAIDs. He was advised that he can continue to take up to 3000 mg of Tylenol in a 24-hour period as needed. He was also prescribed Voltaren gel to be used as needed up to 4 times per day on the affected hip. Orders:  -     Diclofenac Sodium (VOLTAREN) 1 %; Apply 2 g topically 4 (four) times a day    2. Primary hypertension  Assessment & Plan:  Patient's blood pressure was not at goal in the office today however, he was having acute hip pain so I feel this was a transient raise in his blood pressure. Patient's blood pressure can be reassessed at his next office visit. Subjective      Left hip pain: Patient reports over the past 2 weeks he has been experiencing intermittent episodes of left hip pain. He reports that the pain seems to worsen when standing and walking for prolonged periods of time and does improve when he rests. He denies any recent falls or injuries to the area. He does report intermittent episodes of radiation of the pain down his left leg but denies any associated paresthesias. Patient also denies any lower back or buttocks pain. Patient reports that he does do a lot of standing and walking at his job and has done this for many years. Patient reports that he has been taking Tylenol as needed for his pain and has noted some improvement in his symptoms. Hypertension: Patient is currently managed on Lopressor, lisinopril, and digoxin. Patient denies lightheadedness, dizziness, frequent headaches, or vision changes.       Review of Systems   Constitutional:  Negative for chills and fever. HENT:  Negative for ear pain and sore throat. Eyes:  Negative for pain and visual disturbance. Respiratory:  Negative for cough, chest tightness, shortness of breath and wheezing. Cardiovascular:  Negative for chest pain, palpitations and leg swelling. Gastrointestinal:  Negative for abdominal pain, constipation, diarrhea, nausea and vomiting. Endocrine: Negative for cold intolerance and heat intolerance. Genitourinary:  Negative for decreased urine volume, dysuria and hematuria. Musculoskeletal:  Positive for arthralgias (left hip). Negative for back pain, joint swelling and myalgias. Skin:  Negative for color change and rash. Allergic/Immunologic: Negative for environmental allergies. Neurological:  Negative for dizziness, seizures, syncope, weakness, light-headedness, numbness and headaches. Hematological:  Negative for adenopathy. Psychiatric/Behavioral:  Negative for confusion. The patient is not nervous/anxious. All other systems reviewed and are negative. Current Outpatient Medications on File Prior to Visit   Medication Sig   • apixaban (Eliquis) 5 mg Take 1 tablet (5 mg total) by mouth 2 (two) times a day   • atorvastatin (LIPITOR) 40 mg tablet Take 1 tablet (40 mg total) by mouth daily   • ciclopirox (PENLAC) 8 % solution APPLY TO AFFECTED NAILS ONCE DAILY   • clotrimazole (LOTRIMIN) 1 % cream Apply topically 2 (two) times a day Continue for 2-4 weeks.    • digoxin (LANOXIN) 0.25 mg tablet Take 1 tablet (250 mcg total) by mouth daily   • econazole nitrate 1 % cream Apply topically 2 (two) times a day   • glucose blood test strip 3 (three) times a day   • Lancets (ONETOUCH ULTRASOFT) lancets 3 (three) times a day   • lisinopril (ZESTRIL) 5 mg tablet Take 1 tablet (5 mg total) by mouth daily   • metFORMIN (GLUCOPHAGE) 500 mg tablet TAKE 1 TABLET TWICE A DAY   • metoprolol tartrate (LOPRESSOR) 25 mg tablet Take 0.5 tablets (12.5 mg total) by mouth every 12 (twelve) hours   • Milk Thistle 250 MG CAPS Take by mouth   • omeprazole (PriLOSEC) 20 mg delayed release capsule Take 1 capsule (20 mg total) by mouth daily   • sildenafil (VIAGRA) 50 MG tablet Take 1 tablet (50 mg total) by mouth daily as needed for erectile dysfunction   • [DISCONTINUED] Diclofenac Sodium (VOLTAREN) 1 % APPLY 4 GRAMS TOPICALLY 4 TIMES A DAY   • [DISCONTINUED] digoxin (LANOXIN) 0.25 mg tablet Take 1 tablet (250 mcg total) by mouth daily       Objective     /80 (BP Location: Right arm, Patient Position: Sitting, Cuff Size: Large)   Pulse 88   Temp 97.5 °F (36.4 °C) (Temporal)   Wt 94.4 kg (208 lb 2 oz)   SpO2 97%   BMI 28.23 kg/m²     Physical Exam  Vitals and nursing note reviewed. Constitutional:       General: He is not in acute distress. Appearance: Normal appearance. He is not ill-appearing. HENT:      Head: Normocephalic. Eyes:      Conjunctiva/sclera: Conjunctivae normal.   Cardiovascular:      Rate and Rhythm: Normal rate and regular rhythm. Pulses: Normal pulses. Carotid pulses are 2+ on the right side and 2+ on the left side. Radial pulses are 2+ on the right side and 2+ on the left side. Posterior tibial pulses are 2+ on the right side and 2+ on the left side. Heart sounds: Normal heart sounds. No murmur heard. Pulmonary:      Effort: Pulmonary effort is normal. No respiratory distress. Breath sounds: Normal breath sounds. No decreased breath sounds, wheezing, rhonchi or rales. Abdominal:      General: Abdomen is flat. Bowel sounds are normal. There is no distension. Palpations: Abdomen is soft. Tenderness: There is no abdominal tenderness. There is no guarding. Musculoskeletal:         General: Normal range of motion. Cervical back: Normal range of motion. Lumbar back: No swelling, edema, spasms, tenderness or bony tenderness. Normal range of motion.  Negative right straight leg raise test and negative left straight leg raise test.      Left hip: Bony tenderness present. No deformity, tenderness or crepitus. Normal range of motion. Normal strength. Right lower leg: No edema. Left lower leg: No edema. Comments: Patient did have pain with palpation of the left acetabular area. Patient did have a full range of motion of the left hip and denied any pain while performing range of motion exercises. Skin:     General: Skin is warm and dry. Capillary Refill: Capillary refill takes less than 2 seconds. Neurological:      General: No focal deficit present. Mental Status: He is alert and oriented to person, place, and time. Psychiatric:         Mood and Affect: Mood normal.         Behavior: Behavior normal.         Thought Content:  Thought content normal.         Judgment: Judgment normal.       NISHA Owens

## 2023-11-26 PROBLEM — Z00.00 HEALTHCARE MAINTENANCE: Status: RESOLVED | Noted: 2023-09-27 | Resolved: 2023-11-26

## 2023-11-26 PROBLEM — Z12.5 SCREENING FOR PROSTATE CANCER: Status: RESOLVED | Noted: 2023-09-27 | Resolved: 2023-11-26

## 2024-01-12 DIAGNOSIS — E11.9 TYPE 2 DIABETES MELLITUS WITHOUT COMPLICATION, UNSPECIFIED WHETHER LONG TERM INSULIN USE (HCC): ICD-10-CM

## 2024-01-12 RX ORDER — OMEPRAZOLE 20 MG/1
20 CAPSULE, DELAYED RELEASE ORAL DAILY
Qty: 90 CAPSULE | Refills: 1 | Status: SHIPPED | OUTPATIENT
Start: 2024-01-12

## 2024-01-31 ENCOUNTER — TELEPHONE (OUTPATIENT)
Dept: FAMILY MEDICINE CLINIC | Facility: CLINIC | Age: 59
End: 2024-01-31

## 2024-01-31 NOTE — TELEPHONE ENCOUNTER
----- Message from Doron Barnett MD sent at 1/30/2024  5:34 PM EST -----  Iris exam either showed an abnormality for which further workup is recommended, or was not able to be graded.  Recommend referral to Ophthalmology for further evaluation

## 2024-02-06 ENCOUNTER — TELEPHONE (OUTPATIENT)
Dept: FAMILY MEDICINE CLINIC | Facility: CLINIC | Age: 59
End: 2024-02-06

## 2024-03-11 DIAGNOSIS — I48.21 PERMANENT ATRIAL FIBRILLATION (HCC): ICD-10-CM

## 2024-03-11 DIAGNOSIS — E11.9 TYPE 2 DIABETES MELLITUS WITHOUT COMPLICATION, UNSPECIFIED WHETHER LONG TERM INSULIN USE (HCC): ICD-10-CM

## 2024-03-11 DIAGNOSIS — I63.519 ACUTE ISCHEMIC CEREBROVASCULAR ACCIDENT (CVA) INVOLVING MIDDLE CEREBRAL ARTERY TERRITORY (HCC): ICD-10-CM

## 2024-03-11 DIAGNOSIS — R80.9 CONTROLLED TYPE 2 DIABETES MELLITUS WITH MICROALBUMINURIA, WITHOUT LONG-TERM CURRENT USE OF INSULIN: ICD-10-CM

## 2024-03-11 DIAGNOSIS — E11.29 CONTROLLED TYPE 2 DIABETES MELLITUS WITH MICROALBUMINURIA, WITHOUT LONG-TERM CURRENT USE OF INSULIN: ICD-10-CM

## 2024-04-01 LAB
LEFT EYE DIABETIC RETINOPATHY: NORMAL
RIGHT EYE DIABETIC RETINOPATHY: NORMAL

## 2024-04-17 NOTE — PROGRESS NOTES
Cardiology Consultation     Priyank Mackenzie  [id number]  1965  [facility] CARDIOLOGY [place]  [address] [address]      1. Permanent atrial fibrillation (HCC)  POCT ECG      2. Primary hypertension        3. Mixed hyperlipidemia        4. Cerebrovascular accident (CVA) due to embolism of precerebral artery (HCC)        5. Tobacco abuse        6. RBBB              Discussion/Summary:  Permanent atrial fibrillation   -Rate control with digoxin 250 mcg daily and Lopressor 12.5 mg twice daily  -TTE 2/12/2022 showed EF 55%, moderate LA, no significant valvular disease  - Anticoagulated on Eliquis 5 mg twice daily  Hypertension   -Continue with lisinopril 5 mg daily and Lopressor 12.5 mg twice daily  -Blood pressure is borderline elevated today, continue to monitor for now  Hyperlipidemia   -Continue with atorvastatin 40 mg daily  - Lipid profile 3/2/2023 showed total cholesterol 118, triglycerides 87, HDL 36, LDL 65  CVA  -History of embolic CVA around 2019 in the setting of atrial fibrillation  - Continue with Eliquis  - Was only on aspirin at the time of his CVA  Tobacco use  -Former smoker  - Encouraged continued abstinence from tobacco  RBBB  -No evidence of bradycardia arrhythmias  - Will continue to monitor  Type 2 diabetes  -Hemoglobin A1c 8.3 on 1/27/2023  Liver cirrhosis   Hepatitis C     Follow-up in 1 year.    History of Present Illness:  Priyank Mackenzie is a 59 y.o. year old male with a past medical history of permanent afib, hypertension, hyperlipidemia, RBBB, embolic TIA, tobacco use, type 2 diabetes, hepatitis C and liver cirrhosis.     He reports feeling well today and has no complaints.  He reports staying active and denies any symptoms with exertion.  He reports standing at work for long periods of time and had significant lower extremity edema in the past.  Since using the compression stockings at work, they have significantly improved.  Denies any chest pain,  palpitations, shortness of breath, or other concerning cardiac symptoms at rest or with exertion.      Patient Active Problem List   Diagnosis    Allergic rhinitis    A-fib (HCC)    Chronic hepatitis C virus infection (HCC)    Colon polyp    Type 2 diabetes mellitus (HCC)    Erectile dysfunction    Esophageal reflux    Microalbuminuria    Nocturia    Palpitations    RBBB    Thrombocytopenia (HCC)    Tobacco abuse    Hand dermatitis    Chronic pain of right ankle    Cerebrovascular accident (CVA) due to embolism of precerebral artery (HCC)    Overweight    Cirrhosis of liver (HCC)    Hypertension    Muscle ache    Acute pain of right shoulder    Tinea cruris    Cervical disc disorder with radiculopathy of mid-cervical region    Low HDL (under 40)    DDD (degenerative disc disease), cervical    Cervical radiculopathy    Onychomycosis    Herniation of intervertebral disc at C5-C6 level    Right arm pain    Cervicalgia    Other specified pre-operative examination    Umbilical hernia without obstruction and without gangrene    Leg cramp    Anticoagulated by anticoagulation treatment    Left hip pain     Past Medical History:   Diagnosis Date    A-fib (HCC)     Colon polyp     Diabetes mellitus (HCC)     GERD (gastroesophageal reflux disease)     Hyperlipidemia     Hypertension     Liver disease     Hx Hep C treated    Neck pain     Osteoarthritis     TIA (transient ischemic attack)      Social History     Socioeconomic History    Marital status: Single     Spouse name: Not on file    Number of children: Not on file    Years of education: Not on file    Highest education level: Not on file   Occupational History    Occupation: EMPLOYED   Tobacco Use    Smoking status: Former     Current packs/day: 0.00     Types: Cigarettes     Quit date: 2018     Years since quittin.6    Smokeless tobacco: Former   Vaping Use    Vaping status: Never Used   Substance and Sexual Activity    Alcohol use: No    Drug use: No      Comment: ALL SCRIPTS SAYS ACTIVE    Sexual activity: Yes     Partners: Female     Comment: HETROSEXUAL SEXUALLY ACTIVE [employer] RELATION[employer]IP   Other Topics Concern    Not on file   Social History Narrative    Not on file     Social Determinants of Health     Financial Resource Strain: Not on file   Food Insecurity: Not on file   Transportation Needs: Not on file   Physical Activity: Not on file   Stress: Not on file   Social Connections: Not on file   Intimate Partner Violence: Not on file   Housing Stability: Not on file      Family History   Problem Relation Age of Onset    Atrial fibrillation Mother     Hypertension Mother     No Known Problems Father      Past Surgical History:   Procedure Laterality Date    ROLAN HOLE FOR SUBDURAL HEMATOMA      COLONOSCOPY N/A 2/22/2016    Procedure: COLONOSCOPY;  Surgeon: Carlos Calderon MD;  Location: [facility];  Service:     INGUINAL HERNIA REPAIR      ORTHOPEDIC SURGERY      SHOULDER SURGERY Left        Current Outpatient Medications:     apixaban (Eliquis) 5 mg, Take 1 tablet (5 mg total) by mouth 2 (two) times a day, Disp: 180 tablet, Rfl: 3    atorvastatin (LIPITOR) 40 mg tablet, Take 1 tablet (40 mg total) by mouth daily, Disp: 90 tablet, Rfl: 3    digoxin (LANOXIN) 0.25 mg tablet, Take 1 tablet (250 mcg total) by mouth daily, Disp: 90 tablet, Rfl: 3    glucose blood test strip, 3 (three) times a day, Disp: , Rfl:     Lancets (ONETOUCH ULTRASOFT) lancets, 3 (three) times a day, Disp: , Rfl:     lisinopril (ZESTRIL) 5 mg tablet, Take 1 tablet (5 mg total) by mouth daily, Disp: 90 tablet, Rfl: 3    metFORMIN (GLUCOPHAGE) 500 mg tablet, TAKE 1 TABLET TWICE A DAY, Disp: 180 tablet, Rfl: 3    metoprolol tartrate (LOPRESSOR) 25 mg tablet, TAKE ONE-HALF (1/2) TABLET EVERY 12 HOURS, Disp: 90 tablet, Rfl: 1    Milk Thistle 250 MG CAPS, Take by mouth, Disp: , Rfl:     omeprazole (PriLOSEC) 20 mg delayed release capsule, TAKE 1 CAPSULE DAILY, Disp: 90 capsule, Rfl: 1    sildenafil  (VIAGRA) 50 MG tablet, Take 1 tablet (50 mg total) by mouth daily as needed for erectile dysfunction, Disp: 10 tablet, Rfl: 3  No Known Allergies      Labs:  Lab Results   Component Value Date    ALT 23 2023    AST 18 2023    BUN 15 2023    CALCIUM 9.2 2023     2023    CHOL 121 (L) 2015    CO2 32 2023    CREATININE 0.84 2023    CREAT 130 2015    HDL 36 (L) 2023    HCT 39.9 2023    HGB 13.8 2023    HGBA1C 8.0 (H) 2023     (L) 2023    K 4.3 2023    PSA 0.15 2023     2015    TRIG 87 2023    WBC 5.8 2023       Imaging: No results found.    EC2024: Atrial fibrillation rate of 75 bpm, incomplete RBBB, nonspecific ST-T wave changes in the inferior leads    Review of Systems:  Review of Systems   Constitutional:  Negative for chills, diaphoresis, fatigue and fever.   HENT:  Negative for congestion.    Eyes:  Negative for photophobia and visual disturbance.   Respiratory:  Negative for chest tightness and shortness of breath.    Cardiovascular:  Negative for chest pain, palpitations and leg swelling.   Gastrointestinal:  Negative for abdominal distention, abdominal pain, diarrhea, nausea and vomiting.   Genitourinary:  Negative for difficulty urinating and dysuria.   Musculoskeletal:  Negative for arthralgias, gait problem and joint swelling.   Skin:  Negative for color change, pallor and rash.   Neurological:  Negative for dizziness, syncope, numbness and headaches.   Psychiatric/Behavioral:  Negative for agitation, behavioral problems and confusion.          Vitals:    24 1603   BP: 140/70   Pulse: 75        Vitals:    24 1603   Weight: 95.8 kg (211 lb 3.2 oz)             Physical Exam:  General appearance:  Appears stated age, alert, well appearing and in no distress  HEENT:  PERRLA, EOMI, no scleral icterus, no conjunctival pallor  NECK:  Supple, No elevated JVP, no  thyromegaly, no carotid bruits  HEART:  Regular rate, irregularly irregular rhythm, no significant audible murmur  LUNGS:  Clear to auscultation bilaterally  ABDOMEN:  Soft, non-tender, positive bowel sounds, no rebound or guarding, no organomegaly   EXTREMITIES: Trace bilateral lower extremity edema  VASCULAR:  Normal pedal pulses   SKIN: No lesions or rashes on exposed skin  NEURO:  CN II-XII intact, no focal deficits

## 2024-04-18 ENCOUNTER — OFFICE VISIT (OUTPATIENT)
Dept: CARDIOLOGY CLINIC | Facility: CLINIC | Age: 59
End: 2024-04-18
Payer: COMMERCIAL

## 2024-04-18 VITALS
HEART RATE: 75 BPM | WEIGHT: 211.2 LBS | DIASTOLIC BLOOD PRESSURE: 70 MMHG | BODY MASS INDEX: 28.64 KG/M2 | SYSTOLIC BLOOD PRESSURE: 140 MMHG

## 2024-04-18 DIAGNOSIS — I45.10 RBBB: ICD-10-CM

## 2024-04-18 DIAGNOSIS — I10 PRIMARY HYPERTENSION: ICD-10-CM

## 2024-04-18 DIAGNOSIS — E78.2 MIXED HYPERLIPIDEMIA: ICD-10-CM

## 2024-04-18 DIAGNOSIS — I48.21 PERMANENT ATRIAL FIBRILLATION (HCC): Primary | ICD-10-CM

## 2024-04-18 DIAGNOSIS — Z72.0 TOBACCO ABUSE: ICD-10-CM

## 2024-04-18 DIAGNOSIS — I63.10 CEREBROVASCULAR ACCIDENT (CVA) DUE TO EMBOLISM OF PRECEREBRAL ARTERY (HCC): ICD-10-CM

## 2024-04-18 PROCEDURE — 93000 ELECTROCARDIOGRAM COMPLETE: CPT | Performed by: STUDENT IN AN ORGANIZED HEALTH CARE EDUCATION/TRAINING PROGRAM

## 2024-04-18 PROCEDURE — 99214 OFFICE O/P EST MOD 30 MIN: CPT | Performed by: STUDENT IN AN ORGANIZED HEALTH CARE EDUCATION/TRAINING PROGRAM

## 2024-05-06 ENCOUNTER — TELEPHONE (OUTPATIENT)
Age: 59
End: 2024-05-06

## 2024-05-06 ENCOUNTER — APPOINTMENT (OUTPATIENT)
Dept: LAB | Facility: MEDICAL CENTER | Age: 59
End: 2024-05-06
Payer: COMMERCIAL

## 2024-05-06 ENCOUNTER — OFFICE VISIT (OUTPATIENT)
Dept: FAMILY MEDICINE CLINIC | Facility: CLINIC | Age: 59
End: 2024-05-06
Payer: COMMERCIAL

## 2024-05-06 VITALS
OXYGEN SATURATION: 98 % | HEIGHT: 72 IN | BODY MASS INDEX: 27.77 KG/M2 | SYSTOLIC BLOOD PRESSURE: 120 MMHG | DIASTOLIC BLOOD PRESSURE: 80 MMHG | WEIGHT: 205 LBS | HEART RATE: 86 BPM

## 2024-05-06 DIAGNOSIS — Z13.6 SCREENING FOR HEART DISEASE: ICD-10-CM

## 2024-05-06 DIAGNOSIS — I48.0 PAROXYSMAL ATRIAL FIBRILLATION (HCC): ICD-10-CM

## 2024-05-06 DIAGNOSIS — Z12.5 SCREENING FOR MALIGNANT NEOPLASM OF PROSTATE: ICD-10-CM

## 2024-05-06 DIAGNOSIS — N18.1 TYPE 2 DIABETES MELLITUS WITH STAGE 1 CHRONIC KIDNEY DISEASE, WITHOUT LONG-TERM CURRENT USE OF INSULIN  (HCC): ICD-10-CM

## 2024-05-06 DIAGNOSIS — I10 PRIMARY HYPERTENSION: ICD-10-CM

## 2024-05-06 DIAGNOSIS — I63.10 CEREBROVASCULAR ACCIDENT (CVA) DUE TO EMBOLISM OF PRECEREBRAL ARTERY (HCC): ICD-10-CM

## 2024-05-06 DIAGNOSIS — N18.1 TYPE 2 DIABETES MELLITUS WITH STAGE 1 CHRONIC KIDNEY DISEASE, WITHOUT LONG-TERM CURRENT USE OF INSULIN  (HCC): Primary | ICD-10-CM

## 2024-05-06 DIAGNOSIS — Z87.19 PERSONAL HISTORY OF UNSPECIFIED DIGESTIVE DISEASE: ICD-10-CM

## 2024-05-06 DIAGNOSIS — E11.22 TYPE 2 DIABETES MELLITUS WITH STAGE 1 CHRONIC KIDNEY DISEASE, WITHOUT LONG-TERM CURRENT USE OF INSULIN  (HCC): ICD-10-CM

## 2024-05-06 DIAGNOSIS — E11.22 TYPE 2 DIABETES MELLITUS WITH STAGE 1 CHRONIC KIDNEY DISEASE, WITHOUT LONG-TERM CURRENT USE OF INSULIN  (HCC): Primary | ICD-10-CM

## 2024-05-06 PROBLEM — K74.60 CIRRHOSIS OF LIVER (HCC): Status: RESOLVED | Noted: 2020-04-10 | Resolved: 2024-05-06

## 2024-05-06 LAB
AFP-TM SERPL-MCNC: 4.54 NG/ML (ref 0–9)
ALBUMIN SERPL BCP-MCNC: 4.4 G/DL (ref 3.5–5)
ALP SERPL-CCNC: 48 U/L (ref 34–104)
ALT SERPL W P-5'-P-CCNC: 18 U/L (ref 7–52)
ANION GAP SERPL CALCULATED.3IONS-SCNC: 10 MMOL/L (ref 4–13)
AST SERPL W P-5'-P-CCNC: 18 U/L (ref 13–39)
BASOPHILS # BLD AUTO: 0.02 THOUSANDS/ÂΜL (ref 0–0.1)
BASOPHILS NFR BLD AUTO: 0 % (ref 0–1)
BILIRUB SERPL-MCNC: 2.04 MG/DL (ref 0.2–1)
BUN SERPL-MCNC: 16 MG/DL (ref 5–25)
CALCIUM SERPL-MCNC: 9.3 MG/DL (ref 8.4–10.2)
CHLORIDE SERPL-SCNC: 101 MMOL/L (ref 96–108)
CO2 SERPL-SCNC: 29 MMOL/L (ref 21–32)
CREAT SERPL-MCNC: 0.88 MG/DL (ref 0.6–1.3)
EOSINOPHIL # BLD AUTO: 0.09 THOUSAND/ÂΜL (ref 0–0.61)
EOSINOPHIL NFR BLD AUTO: 1 % (ref 0–6)
ERYTHROCYTE [DISTWIDTH] IN BLOOD BY AUTOMATED COUNT: 16.6 % (ref 11.6–15.1)
FERRITIN SERPL-MCNC: 139 NG/ML (ref 24–336)
GFR SERPL CREATININE-BSD FRML MDRD: 93 ML/MIN/1.73SQ M
GLUCOSE P FAST SERPL-MCNC: 276 MG/DL (ref 65–99)
HCT VFR BLD AUTO: 39.8 % (ref 36.5–49.3)
HGB BLD-MCNC: 13.3 G/DL (ref 12–17)
IMM GRANULOCYTES # BLD AUTO: 0.03 THOUSAND/UL (ref 0–0.2)
IMM GRANULOCYTES NFR BLD AUTO: 1 % (ref 0–2)
INR PPP: 1.35 (ref 0.84–1.19)
IRON SATN MFR SERPL: 29 % (ref 15–50)
IRON SERPL-MCNC: 81 UG/DL (ref 50–212)
LYMPHOCYTES # BLD AUTO: 1.01 THOUSANDS/ÂΜL (ref 0.6–4.47)
LYMPHOCYTES NFR BLD AUTO: 15 % (ref 14–44)
MCH RBC QN AUTO: 31.1 PG (ref 26.8–34.3)
MCHC RBC AUTO-ENTMCNC: 33.4 G/DL (ref 31.4–37.4)
MCV RBC AUTO: 93 FL (ref 82–98)
MONOCYTES # BLD AUTO: 0.52 THOUSAND/ÂΜL (ref 0.17–1.22)
MONOCYTES NFR BLD AUTO: 8 % (ref 4–12)
NEUTROPHILS # BLD AUTO: 4.94 THOUSANDS/ÂΜL (ref 1.85–7.62)
NEUTS SEG NFR BLD AUTO: 75 % (ref 43–75)
NRBC BLD AUTO-RTO: 0 /100 WBCS
PLATELET # BLD AUTO: 121 THOUSANDS/UL (ref 149–390)
PMV BLD AUTO: 11 FL (ref 8.9–12.7)
POTASSIUM SERPL-SCNC: 4.1 MMOL/L (ref 3.5–5.3)
PROT SERPL-MCNC: 6.7 G/DL (ref 6.4–8.4)
PROTHROMBIN TIME: 16.5 SECONDS (ref 11.6–14.5)
RBC # BLD AUTO: 4.28 MILLION/UL (ref 3.88–5.62)
SODIUM SERPL-SCNC: 140 MMOL/L (ref 135–147)
TIBC SERPL-MCNC: 277 UG/DL (ref 250–450)
UIBC SERPL-MCNC: 196 UG/DL (ref 155–355)
WBC # BLD AUTO: 6.61 THOUSAND/UL (ref 4.31–10.16)

## 2024-05-06 PROCEDURE — 83550 IRON BINDING TEST: CPT

## 2024-05-06 PROCEDURE — 36415 COLL VENOUS BLD VENIPUNCTURE: CPT

## 2024-05-06 PROCEDURE — 99214 OFFICE O/P EST MOD 30 MIN: CPT | Performed by: PHYSICIAN ASSISTANT

## 2024-05-06 PROCEDURE — 80053 COMPREHEN METABOLIC PANEL: CPT

## 2024-05-06 PROCEDURE — 82105 ALPHA-FETOPROTEIN SERUM: CPT

## 2024-05-06 PROCEDURE — 85610 PROTHROMBIN TIME: CPT

## 2024-05-06 PROCEDURE — 82728 ASSAY OF FERRITIN: CPT

## 2024-05-06 PROCEDURE — 83540 ASSAY OF IRON: CPT

## 2024-05-06 PROCEDURE — 85025 COMPLETE CBC W/AUTO DIFF WBC: CPT

## 2024-05-06 RX ORDER — GLIMEPIRIDE 2 MG/1
2 TABLET ORAL
Qty: 30 TABLET | Refills: 5 | Status: SHIPPED | OUTPATIENT
Start: 2024-05-06 | End: 2024-11-02

## 2024-05-06 NOTE — PROGRESS NOTES
Name: Priyank Mackenzie      : 1965      MRN: [id number]  Encounter Provider: Kwan Richey PA-C  Encounter Date: 2024   Encounter department: [facility] PRIMARY CARE    Assessment & Plan     Patient Instructions   Assessment/plan:  1.  Type 2 diabetes-not at goal.  Last hemoglobin A1c was 8.0 in 2023.  Recently patient in the emergency room with glucose of 368.  Will continue metformin 1000 mg twice daily and add glimepiride 2 mg in the morning.  Discussed diet and exercise and would recommend assessing complete labs in the next 2 weeks.  Will schedule follow-up in 4 weeks to reevaluate.  2.  Hypertension-stable on lisinopril 5 mg daily, no medication changes.  3.  Atrial fibrillation-stable on Eliquis 5 mg twice daily.  4.  History of CVA-recommend continuing Eliquis.  Patient reports that he stopped atorvastatin sometime ago.  Will reassess labs but likely need to resume therapy.    1. Type 2 diabetes mellitus with stage 1 chronic kidney disease, without long-term current use of insulin  (HCC)  -     Albumin / creatinine urine ratio; Future; Expected date: 2024  -     Comprehensive metabolic panel; Future; Expected date: 2024  -     Hemoglobin A1C; Future; Expected date: 2024  -     CBC and differential; Future; Expected date: 2024  -     Lipid Panel with Direct LDL reflex; Future; Expected date: 2024  -     TSH, 3rd generation with Free T4 reflex; Future; Expected date: 2024  -     glimepiride (AMARYL) 2 mg tablet; Take 1 tablet (2 mg total) by mouth daily with breakfast    2. Cerebrovascular accident (CVA) due to embolism of precerebral artery (HCC)  -     Albumin / creatinine urine ratio; Future; Expected date: 2024  -     Comprehensive metabolic panel; Future; Expected date: 2024  -     Hemoglobin A1C; Future; Expected date: 2024  -     CBC and differential; Future; Expected date: 2024  -     Lipid Panel with Direct  LDL reflex; Future; Expected date: 05/06/2024  -     TSH, 3rd generation with Free T4 reflex; Future; Expected date: 05/06/2024    3. Paroxysmal atrial fibrillation (HCC)  -     Albumin / creatinine urine ratio; Future; Expected date: 05/06/2024  -     Comprehensive metabolic panel; Future; Expected date: 05/06/2024  -     Hemoglobin A1C; Future; Expected date: 05/06/2024  -     CBC and differential; Future; Expected date: 05/06/2024  -     Lipid Panel with Direct LDL reflex; Future; Expected date: 05/06/2024  -     TSH, 3rd generation with Free T4 reflex; Future; Expected date: 05/06/2024    4. Primary hypertension  -     Albumin / creatinine urine ratio; Future; Expected date: 05/06/2024  -     Comprehensive metabolic panel; Future; Expected date: 05/06/2024  -     Hemoglobin A1C; Future; Expected date: 05/06/2024  -     CBC and differential; Future; Expected date: 05/06/2024  -     Lipid Panel with Direct LDL reflex; Future; Expected date: 05/06/2024  -     TSH, 3rd generation with Free T4 reflex; Future; Expected date: 05/06/2024    5. Screening for heart disease    6. Screening for malignant neoplasm of prostate  -     PSA, Total Screen; Future        Depression Screening and Follow-up Plan: Patient was screened for depression during today's encounter. They screened negative with a PHQ-2 score of 0.        Subjective      HPI: This is a 59-year-old gentleman that presents to the office for follow-up of type 2 diabetes and other health conditions including hypertension, history of CVA, and atrial fibrillation.  He does continue Eliquis therapy twice daily.  He also continues metformin 1000 mg twice daily for his diabetes.  His last A1c in November was 8.0.  Patient has spent the last few years around 8.0.  He was recently on a vacation with his girlfriend who works in medicine and realized that he was urinating too frequently.  He came back to the emergency room for evaluation and sugar was 368.  He is concerned  about this.  He also admits that he has not been taking atorvastatin for several years now.  Patient reports that he is feeling well otherwise and has not had any chest pains or shortness of breath.      Review of Systems   Constitutional:  Negative for chills, fatigue and fever.   HENT:  Negative for congestion, ear pain and sinus pressure.    Eyes:  Negative for visual disturbance.   Respiratory:  Negative for cough, chest tightness and shortness of breath.    Cardiovascular:  Negative for chest pain and palpitations.   Gastrointestinal:  Negative for diarrhea, nausea and vomiting.   Endocrine: Negative for polyuria.   Genitourinary:  Negative for dysuria and frequency.   Musculoskeletal:  Negative for arthralgias and myalgias.   Skin:  Negative for pallor and rash.   Neurological:  Negative for dizziness, weakness, light-headedness, numbness and headaches.   Psychiatric/Behavioral:  Negative for agitation, behavioral problems and sleep disturbance.    All other systems reviewed and are negative.      Current Outpatient Medications on File Prior to Visit   Medication Sig   • apixaban (Eliquis) 5 mg Take 1 tablet (5 mg total) by mouth 2 (two) times a day   • digoxin (LANOXIN) 0.25 mg tablet Take 1 tablet (250 mcg total) by mouth daily   • glucose blood test strip 3 (three) times a day   • Lancets (ONETOUCH ULTRASOFT) lancets 3 (three) times a day   • lisinopril (ZESTRIL) 5 mg tablet Take 1 tablet (5 mg total) by mouth daily   • metFORMIN (GLUCOPHAGE) 500 mg tablet TAKE 1 TABLET TWICE A DAY   • metoprolol tartrate (LOPRESSOR) 25 mg tablet TAKE ONE-HALF (1/2) TABLET EVERY 12 HOURS   • Milk Thistle 250 MG CAPS Take by mouth   • omeprazole (PriLOSEC) 20 mg delayed release capsule TAKE 1 CAPSULE DAILY   • sildenafil (VIAGRA) 50 MG tablet Take 1 tablet (50 mg total) by mouth daily as needed for erectile dysfunction   • [DISCONTINUED] atorvastatin (LIPITOR) 40 mg tablet Take 1 tablet (40 mg total) by mouth daily (Patient  not taking: Reported on 5/6/2024)       Objective     /80 (BP Location: Left arm, Patient Position: Sitting, Cuff Size: Standard)   Pulse 86   Ht 6' (1.829 m)   Wt 93 kg (205 lb)   SpO2 98%   BMI 27.80 kg/m²     Physical Exam  Vitals and nursing note reviewed.   Constitutional:       General: He is not in acute distress.     Appearance: He is well-developed.   HENT:      Head: Normocephalic and atraumatic.      Right Ear: External ear normal.      Left Ear: External ear normal.      Nose: Nose normal.      Mouth/Throat:      Pharynx: No oropharyngeal exudate.   Eyes:      Conjunctiva/sclera: Conjunctivae normal.      Pupils: Pupils are equal, round, and reactive to light.   Neck:      Thyroid: No thyromegaly.      Trachea: No tracheal deviation.   Cardiovascular:      Rate and Rhythm: Normal rate and regular rhythm.      Heart sounds: Normal heart sounds. No murmur heard.     No friction rub.   Pulmonary:      Effort: Pulmonary effort is normal. No respiratory distress.      Breath sounds: Normal breath sounds. No wheezing or rales.   Abdominal:      General: Bowel sounds are normal. There is no distension.      Palpations: Abdomen is soft.      Tenderness: There is no abdominal tenderness. There is no guarding or rebound.   Musculoskeletal:         General: No tenderness. Normal range of motion.      Cervical back: Normal range of motion and neck supple.   Lymphadenopathy:      Cervical: No cervical adenopathy.   Skin:     General: Skin is warm and dry.      Findings: No erythema or rash.   Neurological:      Mental Status: He is alert and oriented to person, place, and time.      Cranial Nerves: No cranial nerve deficit.      Coordination: Coordination normal.   Psychiatric:         Behavior: Behavior normal.         Thought Content: Thought content normal.       Kwan Richey PA-C

## 2024-05-06 NOTE — LETTER
May 6, 2024     Patient: Priyank Mackenzie  YOB: 1965  Date of Visit: 5/6/2024      To Whom it May Concern:    Priyank Mackenzie is under my professional care. Priyank was seen in my office on 5/6/2024. Priyank may return to work on 5/7/2024 .    If you have any questions or concerns, please don't hesitate to call.         Sincerely,          Kwan Richey PA-C        CC: No Recipients

## 2024-05-06 NOTE — TELEPHONE ENCOUNTER
Pt was in the emergency department on Saturday, I scheduled an appt for Wednesday the 8th but the pt stated if there was anything we can do to get him in today because he is off of work. Pt put on the wait list. Please advise [phone] thank you.

## 2024-05-13 DIAGNOSIS — E11.22 TYPE 2 DIABETES MELLITUS WITH STAGE 1 CHRONIC KIDNEY DISEASE, WITHOUT LONG-TERM CURRENT USE OF INSULIN  (HCC): Primary | ICD-10-CM

## 2024-05-13 DIAGNOSIS — N18.1 TYPE 2 DIABETES MELLITUS WITH STAGE 1 CHRONIC KIDNEY DISEASE, WITHOUT LONG-TERM CURRENT USE OF INSULIN  (HCC): Primary | ICD-10-CM

## 2024-05-13 NOTE — TELEPHONE ENCOUNTER
Patient called the RX Refill Line. Message is being forwarded to the office.     Patient is requesting a prescription for One Touch Ultra Strips.     Please contact patient at [phone]

## 2024-05-28 DIAGNOSIS — E11.29 CONTROLLED TYPE 2 DIABETES MELLITUS WITH MICROALBUMINURIA, WITHOUT LONG-TERM CURRENT USE OF INSULIN  (HCC): ICD-10-CM

## 2024-05-28 DIAGNOSIS — I63.519 ACUTE ISCHEMIC CEREBROVASCULAR ACCIDENT (CVA) INVOLVING MIDDLE CEREBRAL ARTERY TERRITORY (HCC): ICD-10-CM

## 2024-05-28 DIAGNOSIS — E11.9 TYPE 2 DIABETES MELLITUS WITHOUT COMPLICATION, UNSPECIFIED WHETHER LONG TERM INSULIN USE (HCC): ICD-10-CM

## 2024-05-28 DIAGNOSIS — R80.9 CONTROLLED TYPE 2 DIABETES MELLITUS WITH MICROALBUMINURIA, WITHOUT LONG-TERM CURRENT USE OF INSULIN  (HCC): ICD-10-CM

## 2024-05-28 DIAGNOSIS — E11.22 TYPE 2 DIABETES MELLITUS WITH STAGE 1 CHRONIC KIDNEY DISEASE, WITHOUT LONG-TERM CURRENT USE OF INSULIN  (HCC): ICD-10-CM

## 2024-05-28 DIAGNOSIS — N18.1 TYPE 2 DIABETES MELLITUS WITH STAGE 1 CHRONIC KIDNEY DISEASE, WITHOUT LONG-TERM CURRENT USE OF INSULIN  (HCC): ICD-10-CM

## 2024-05-28 DIAGNOSIS — I48.21 PERMANENT ATRIAL FIBRILLATION (HCC): ICD-10-CM

## 2024-05-28 NOTE — TELEPHONE ENCOUNTER
Reason for call:   [x] Refill   [] Prior Auth  [] Other:     Office:   [x] PCP/Provider -   [] Specialty/Provider -     Medication: metoprolol tartrate (LOPRESSOR) 25 mg tablet     Dose/Frequency: TAKE ONE-HALF (1/2) TABLET EVERY 12 HOURS,     Quantity: 90    Pharmacy: [facility]/pharmacy #[id number] - [place] - [address] [phone]     Does the patient have enough for 3 days?   [] Yes   [x] No - Send as HP to POD

## 2024-05-29 RX ORDER — GLIMEPIRIDE 2 MG/1
2 TABLET ORAL
Qty: 90 TABLET | Refills: 1 | Status: SHIPPED | OUTPATIENT
Start: 2024-05-29

## 2024-06-03 ENCOUNTER — TELEPHONE (OUTPATIENT)
Age: 59
End: 2024-06-03

## 2024-06-03 NOTE — TELEPHONE ENCOUNTER
Patient called stating he is having diarrhea after starting the medicaion glimepiride (AMARYL) 2 mg tablet. He is inquiring if there is another mediation he can try for his diabetes. Please call patient to advise.

## 2024-06-03 NOTE — TELEPHONE ENCOUNTER
Would like to have patient make a visit so that we can discuss appropriately what to do with changing medications for diabetes.

## 2024-06-04 ENCOUNTER — RA CDI HCC (OUTPATIENT)
Dept: OTHER | Facility: HOSPITAL | Age: 59
End: 2024-06-04

## 2024-06-04 NOTE — PROGRESS NOTES
HCC coding opportunities       Chart Reviewed number of suggestions sent to Provider: 1     Patients Insurance     Commercial Insurance: [employer] Commercial [employer]       Based on clinical documentation indicated in patients chart 5/6/2024, it appears that the patient is diagnosed with I63.10 for Cerebrovascular accident (CVA) due to embolism of precerebral artery (HCC)     From the correct coding standpoint, using the below code for personal history of stroke with no residual effects would be more appropriate:     Z86.73: Personal history of transient ischemic attack, and cerebral infarction without residual deficits     Please review above and assess if applicable

## 2024-06-10 ENCOUNTER — OFFICE VISIT (OUTPATIENT)
Dept: FAMILY MEDICINE CLINIC | Facility: CLINIC | Age: 59
End: 2024-06-10
Payer: COMMERCIAL

## 2024-06-10 VITALS
DIASTOLIC BLOOD PRESSURE: 80 MMHG | OXYGEN SATURATION: 99 % | HEART RATE: 73 BPM | HEIGHT: 72 IN | BODY MASS INDEX: 27.77 KG/M2 | WEIGHT: 205 LBS | SYSTOLIC BLOOD PRESSURE: 100 MMHG

## 2024-06-10 DIAGNOSIS — I63.10 CEREBROVASCULAR ACCIDENT (CVA) DUE TO EMBOLISM OF PRECEREBRAL ARTERY (HCC): ICD-10-CM

## 2024-06-10 DIAGNOSIS — I10 PRIMARY HYPERTENSION: ICD-10-CM

## 2024-06-10 DIAGNOSIS — I48.0 PAROXYSMAL ATRIAL FIBRILLATION (HCC): ICD-10-CM

## 2024-06-10 DIAGNOSIS — N18.1 TYPE 2 DIABETES MELLITUS WITH STAGE 1 CHRONIC KIDNEY DISEASE, WITHOUT LONG-TERM CURRENT USE OF INSULIN  (HCC): Primary | ICD-10-CM

## 2024-06-10 DIAGNOSIS — E11.22 TYPE 2 DIABETES MELLITUS WITH STAGE 1 CHRONIC KIDNEY DISEASE, WITHOUT LONG-TERM CURRENT USE OF INSULIN  (HCC): Primary | ICD-10-CM

## 2024-06-10 DIAGNOSIS — B18.2 CHRONIC HEPATITIS C WITHOUT HEPATIC COMA (HCC): ICD-10-CM

## 2024-06-10 LAB — SL AMB POCT HEMOGLOBIN AIC: 6.8 (ref ?–6.5)

## 2024-06-10 PROCEDURE — 99396 PREV VISIT EST AGE 40-64: CPT | Performed by: PHYSICIAN ASSISTANT

## 2024-06-10 PROCEDURE — 83036 HEMOGLOBIN GLYCOSYLATED A1C: CPT | Performed by: PHYSICIAN ASSISTANT

## 2024-06-10 NOTE — PROGRESS NOTES
Adult Annual Physical  Name: Priyank Mackenzie      : 1965      MRN: [id number]  Encounter Provider: Kwan Richey PA-C  Encounter Date: 6/10/2024   Encounter department: [facility] PRIMARY CARE  Patient Instructions   Assessment/plan:  1.  Annual physical exam-patient is up-to-date with colon cancer screening.  He is encouraged to go for labs including PSA screening for prostate prior to our next follow-up in 4 months.  2.  Type 2 diabetes-improving with recent addition of glimepiride.  Patient continues metformin 500 mg twice daily.  Hemoglobin A1c fingerstick in the office today is 6.8.  This is improved from 8.0 last fall.  3.  History of CVA-patient encouraged to get follow-up lipid panel as he is currently off of statin therapy.  He should likely be resuming this with blood thinner.  4.  Atrial fibrillation-stable on Eliquis 5 mg daily, no medication changes.    Assessment & Plan   1. Type 2 diabetes mellitus with stage 1 chronic kidney disease, without long-term current use of insulin  (HCC)  -     POCT hemoglobin A1c  2. Paroxysmal atrial fibrillation (HCC)  3. Cerebrovascular accident (CVA) due to embolism of precerebral artery (HCC)  4. Primary hypertension  5. Chronic hepatitis C without hepatic coma (HCC)    Immunizations and preventive care screenings were discussed with patient today. Appropriate education was printed on patient's after visit summary.        Counseling:  Dental Health: discussed importance of regular tooth brushing, flossing, and dental visits.  Sexual health: discussed sexually transmitted diseases, partner selection, use of condoms, avoidance of unintended pregnancy, and contraceptive alternatives.  Exercise: the importance of regular exercise/physical activity was discussed. Recommend exercise 3-5 times per week for at least 30 minutes.       Depression Screening and Follow-up Plan: Patient was screened for depression during today's encounter. They screened  negative with a PHQ-2 score of 0.        History of Present Illness   {Disappearing Hyperlinks I Encounters * My Last Note * Since Last Visit * History :33328}  Adult Annual Physical:  Patient presents for annual physical. HPI: This is a 59-year-old gentleman that presents to the office for annual physical as well as follow-up of chronic health conditions including type 2 diabetes.  He was seen about a month ago and had elevated sugar readings in the emergency room in the 300s.  He had not been watching his diet and eating healthy.  He was started back on metformin and glimepiride was added.  His last hemoglobin A1c in November was 8.0.  He feels that he is doing much better and some of the pains that he had in his legs has improved.  He feels these were most likely related to diabetes.  He has been watching portion sizes and limiting junk food..     Diet and Physical Activity:  - Diet/Nutrition: well balanced diet, diabetic diet, portion control and frequent junk food.  - Exercise: moderate cardiovascular exercise.    Depression Screening:  - PHQ-2 Score: 0    General Health:  - Sleep: sleeps well.  - Hearing: normal hearing bilateral ears.  - Vision: no vision problems.    Review of Systems   Constitutional:  Negative for chills, fatigue and fever.   HENT:  Negative for congestion, ear pain and sinus pressure.    Eyes:  Negative for visual disturbance.   Respiratory:  Negative for cough, chest tightness and shortness of breath.    Cardiovascular:  Negative for chest pain and palpitations.   Gastrointestinal:  Negative for diarrhea, nausea and vomiting.   Endocrine: Negative for polyuria.   Genitourinary:  Negative for dysuria and frequency.   Musculoskeletal:  Negative for arthralgias and myalgias.   Skin:  Negative for pallor and rash.   Neurological:  Negative for dizziness, weakness, light-headedness, numbness and headaches.   Psychiatric/Behavioral:  Negative for agitation, behavioral problems and sleep  disturbance.    All other systems reviewed and are negative.        Objective   {Disappearing Hyperlinks   Review Vitals * Enter New Vitals * Results Review * Labs * Imaging * Cardiology * Procedures * Lung Cancer Screening :77664}  /80 (BP Location: Left arm, Patient Position: Sitting, Cuff Size: Standard)   Pulse 73   Ht 6' (1.829 m)   Wt 93 kg (205 lb)   SpO2 99%   BMI 27.80 kg/m²     Physical Exam  Constitutional:       General: He is not in acute distress.     Appearance: He is well-developed.   HENT:      Head: Normocephalic and atraumatic.      Right Ear: Tympanic membrane normal.      Left Ear: Tympanic membrane normal.   Eyes:      Conjunctiva/sclera: Conjunctivae normal.   Cardiovascular:      Rate and Rhythm: Normal rate and regular rhythm.      Pulses: no weak pulses.           Dorsalis pedis pulses are 2+ on the right side and 2+ on the left side.        Posterior tibial pulses are 2+ on the right side and 2+ on the left side.   Pulmonary:      Effort: Pulmonary effort is normal.   Abdominal:      General: Abdomen is flat. Bowel sounds are normal. There is no distension.      Palpations: Abdomen is soft. There is no mass.   Musculoskeletal:         General: Normal range of motion.      Cervical back: Normal range of motion.   Feet:      Right foot:      Skin integrity: No ulcer, skin breakdown, erythema, warmth, callus or dry skin.      Left foot:      Skin integrity: No ulcer, skin breakdown, erythema, warmth, callus or dry skin.   Skin:     General: Skin is warm.      Findings: No rash.   Neurological:      Mental Status: He is alert and oriented to person, place, and time.   Psychiatric:         Mood and Affect: Mood normal.     Patient's shoes and socks removed.    Right Foot/Ankle   Right Foot Inspection  Skin Exam: skin normal and skin intact. No dry skin, no warmth, no callus, no erythema, no maceration, no abnormal color, no pre-ulcer, no ulcer and no callus.     Toe Exam: ROM and  strength within normal limits.     Sensory   Vibration: intact  Proprioception: intact  Monofilament testing: intact    Vascular  Capillary refills: < 3 seconds  The right DP pulse is 2+. The right PT pulse is 2+.     Right Toe  - Comprehensive Exam  Ecchymosis: none  Arch: normal  Hammertoes: absent  Claw Toes: absent  Swelling: none   Tenderness: none       Left Foot/Ankle  Left Foot Inspection  Skin Exam: skin normal and skin intact. No dry skin, no warmth, no erythema, no maceration, normal color, no pre-ulcer, no ulcer and no callus.     Toe Exam: ROM and strength within normal limits.     Sensory   Vibration: intact  Proprioception: intact  Monofilament testing: intact    Vascular  Capillary refills: < 3 seconds  The left DP pulse is 2+. The left PT pulse is 2+.     Left Toe  - Comprehensive Exam  Ecchymosis: none  Arch: normal  Hammertoes: absent  Claw toes: absent  Swelling: none   Tenderness: none       Assign Risk Category  No deformity present  No loss of protective sensation  No weak pulses  Risk: 0     Administrative Statements {Disappearing Hyperlinks I  Level of Service * [facility]\""P:16850}

## 2024-06-10 NOTE — PATIENT INSTRUCTIONS
Assessment/plan:  1.  Annual physical exam-patient is up-to-date with colon cancer screening.  He is encouraged to go for labs including PSA screening for prostate prior to our next follow-up in 4 months.  2.  Type 2 diabetes-improving with recent addition of glimepiride.  Patient continues metformin 500 mg twice daily.  Hemoglobin A1c fingerstick in the office today is 6.8.  This is improved from 8.0 last fall.  3.  History of CVA-patient encouraged to get follow-up lipid panel as he is currently off of statin therapy.  He should likely be resuming this with blood thinner.  4.  Atrial fibrillation-stable on Eliquis 5 mg daily, no medication changes.

## 2024-07-10 DIAGNOSIS — E11.9 TYPE 2 DIABETES MELLITUS WITHOUT COMPLICATION, UNSPECIFIED WHETHER LONG TERM INSULIN USE (HCC): ICD-10-CM

## 2024-07-10 RX ORDER — OMEPRAZOLE 20 MG/1
20 CAPSULE, DELAYED RELEASE ORAL DAILY
Qty: 100 CAPSULE | Refills: 1 | Status: SHIPPED | OUTPATIENT
Start: 2024-07-10

## 2024-07-15 ENCOUNTER — TELEPHONE (OUTPATIENT)
Age: 59
End: 2024-07-15

## 2024-07-15 NOTE — TELEPHONE ENCOUNTER
This message is for the Dr Barnett:  [facility] orthopedics is requesting an ambulatory referral for patient he walked into  their clinic today to get his feet checked. Please fax to [phone]. Please advise.

## 2024-07-15 NOTE — TELEPHONE ENCOUNTER
[facility] orthopedics is requesting an insurance referral for patient he was seen at their clinic today to get his feet checked. I was informed to fax to this number [phone]. For further clarification, please call [phone].         DX Code: M25.571,   Date of Service: 07/15/2024  [facility]  [address]  [address]  [place] [address]  Ph: [phone]  Fax: [phone]  NPI: [id number]  Pt Ph: [phone]

## 2024-07-18 NOTE — TELEPHONE ENCOUNTER
I advised Bryant from [facility] of the previous message.

## 2024-08-05 ENCOUNTER — TELEPHONE (OUTPATIENT)
Age: 59
End: 2024-08-05

## 2024-08-05 DIAGNOSIS — E11.9 TYPE 2 DIABETES MELLITUS WITHOUT COMPLICATION, UNSPECIFIED WHETHER LONG TERM INSULIN USE (HCC): ICD-10-CM

## 2024-08-05 DIAGNOSIS — R80.9 CONTROLLED TYPE 2 DIABETES MELLITUS WITH MICROALBUMINURIA, WITHOUT LONG-TERM CURRENT USE OF INSULIN  (HCC): ICD-10-CM

## 2024-08-05 DIAGNOSIS — E11.29 CONTROLLED TYPE 2 DIABETES MELLITUS WITH MICROALBUMINURIA, WITHOUT LONG-TERM CURRENT USE OF INSULIN  (HCC): ICD-10-CM

## 2024-08-05 DIAGNOSIS — I48.21 PERMANENT ATRIAL FIBRILLATION (HCC): ICD-10-CM

## 2024-08-05 DIAGNOSIS — I63.519 ACUTE ISCHEMIC CEREBROVASCULAR ACCIDENT (CVA) INVOLVING MIDDLE CEREBRAL ARTERY TERRITORY (HCC): ICD-10-CM

## 2024-08-05 NOTE — TELEPHONE ENCOUNTER
Please send 30 day for [facility] to fill [facility] cash until his insurance picks up the next 90 day supply  Reason for call:   [x] Refill   [] Prior Auth  [] Other:     Office:   [x] PCP/Provider - Kaiden  [] Specialty/Provider -         Does the patient have enough for 3 days?   [] Yes   [x] No - Send as HP to POD

## 2024-08-05 NOTE — TELEPHONE ENCOUNTER
Pt contacted the rx refill line asking for refills on his metoprolol prescription since he only has one pill left for tomorrow.. Advised patient that there was 90 tabs + 1 refill sent on 05/28/2024. Pharmacy stated that his insurance will not cover it until 08/24/2024. Are we able to contact patient's insurance company to do an early refill override?    Please contact patient back at:  [phone]

## 2024-08-07 NOTE — TELEPHONE ENCOUNTER
PT should only be taking 1/2 tab twice daily so he should not run out of pills early. We can not make pharmacy give early Rx unless we change the instructions on how pt is taking the med which we do not need to do. If for some reason he is short he can try paying out of pocket for a few pills?

## 2024-08-07 NOTE — TELEPHONE ENCOUNTER
Patient called requesting refill for metoprolol. Patient made aware medication was refilled on 08/05/24 for 30 with 0 refills to [facility] pharmacy. Patient instructed to contact the pharmacy to obtain refills of medication. Patient verbalized understanding.

## 2024-08-09 ENCOUNTER — APPOINTMENT (OUTPATIENT)
Dept: LAB | Facility: MEDICAL CENTER | Age: 59
End: 2024-08-09
Payer: COMMERCIAL

## 2024-08-09 DIAGNOSIS — Z12.5 SCREENING FOR MALIGNANT NEOPLASM OF PROSTATE: ICD-10-CM

## 2024-08-09 DIAGNOSIS — E11.22 TYPE 2 DIABETES MELLITUS WITH STAGE 1 CHRONIC KIDNEY DISEASE, WITHOUT LONG-TERM CURRENT USE OF INSULIN  (HCC): ICD-10-CM

## 2024-08-09 DIAGNOSIS — I48.0 PAROXYSMAL ATRIAL FIBRILLATION (HCC): ICD-10-CM

## 2024-08-09 DIAGNOSIS — I10 PRIMARY HYPERTENSION: ICD-10-CM

## 2024-08-09 DIAGNOSIS — I63.10 CEREBROVASCULAR ACCIDENT (CVA) DUE TO EMBOLISM OF PRECEREBRAL ARTERY (HCC): ICD-10-CM

## 2024-08-09 DIAGNOSIS — N18.1 TYPE 2 DIABETES MELLITUS WITH STAGE 1 CHRONIC KIDNEY DISEASE, WITHOUT LONG-TERM CURRENT USE OF INSULIN  (HCC): ICD-10-CM

## 2024-08-09 LAB
ALBUMIN SERPL BCG-MCNC: 4.4 G/DL (ref 3.5–5)
ALP SERPL-CCNC: 50 U/L (ref 34–104)
ALT SERPL W P-5'-P-CCNC: 20 U/L (ref 7–52)
ANION GAP SERPL CALCULATED.3IONS-SCNC: 5 MMOL/L (ref 4–13)
AST SERPL W P-5'-P-CCNC: 20 U/L (ref 13–39)
BASOPHILS # BLD AUTO: 0.01 THOUSANDS/ÂΜL (ref 0–0.1)
BASOPHILS NFR BLD AUTO: 0 % (ref 0–1)
BILIRUB SERPL-MCNC: 1.45 MG/DL (ref 0.2–1)
BUN SERPL-MCNC: 15 MG/DL (ref 5–25)
CALCIUM SERPL-MCNC: 9.1 MG/DL (ref 8.4–10.2)
CHLORIDE SERPL-SCNC: 102 MMOL/L (ref 96–108)
CHOLEST SERPL-MCNC: 103 MG/DL
CO2 SERPL-SCNC: 31 MMOL/L (ref 21–32)
CREAT SERPL-MCNC: 0.85 MG/DL (ref 0.6–1.3)
CREAT UR-MCNC: 34.6 MG/DL
EOSINOPHIL # BLD AUTO: 0.13 THOUSAND/ÂΜL (ref 0–0.61)
EOSINOPHIL NFR BLD AUTO: 2 % (ref 0–6)
ERYTHROCYTE [DISTWIDTH] IN BLOOD BY AUTOMATED COUNT: 15.9 % (ref 11.6–15.1)
EST. AVERAGE GLUCOSE BLD GHB EST-MCNC: 134 MG/DL
GFR SERPL CREATININE-BSD FRML MDRD: 94 ML/MIN/1.73SQ M
GLUCOSE P FAST SERPL-MCNC: 177 MG/DL (ref 65–99)
HBA1C MFR BLD: 6.3 %
HCT VFR BLD AUTO: 39.6 % (ref 36.5–49.3)
HDLC SERPL-MCNC: 33 MG/DL
HGB BLD-MCNC: 13.8 G/DL (ref 12–17)
IMM GRANULOCYTES # BLD AUTO: 0.02 THOUSAND/UL (ref 0–0.2)
IMM GRANULOCYTES NFR BLD AUTO: 0 % (ref 0–2)
LDLC SERPL CALC-MCNC: 55 MG/DL (ref 0–100)
LYMPHOCYTES # BLD AUTO: 1 THOUSANDS/ÂΜL (ref 0.6–4.47)
LYMPHOCYTES NFR BLD AUTO: 16 % (ref 14–44)
MCH RBC QN AUTO: 32.1 PG (ref 26.8–34.3)
MCHC RBC AUTO-ENTMCNC: 34.8 G/DL (ref 31.4–37.4)
MCV RBC AUTO: 92 FL (ref 82–98)
MICROALBUMIN UR-MCNC: 282 MG/L
MICROALBUMIN/CREAT 24H UR: 815 MG/G CREATININE (ref 0–30)
MONOCYTES # BLD AUTO: 0.5 THOUSAND/ÂΜL (ref 0.17–1.22)
MONOCYTES NFR BLD AUTO: 8 % (ref 4–12)
NEUTROPHILS # BLD AUTO: 4.55 THOUSANDS/ÂΜL (ref 1.85–7.62)
NEUTS SEG NFR BLD AUTO: 74 % (ref 43–75)
NRBC BLD AUTO-RTO: 0 /100 WBCS
PLATELET # BLD AUTO: 105 THOUSANDS/UL (ref 149–390)
PMV BLD AUTO: 11.1 FL (ref 8.9–12.7)
POTASSIUM SERPL-SCNC: 4.1 MMOL/L (ref 3.5–5.3)
PROT SERPL-MCNC: 6.8 G/DL (ref 6.4–8.4)
PSA SERPL-MCNC: 0.14 NG/ML (ref 0–4)
RBC # BLD AUTO: 4.3 MILLION/UL (ref 3.88–5.62)
SODIUM SERPL-SCNC: 138 MMOL/L (ref 135–147)
TRIGL SERPL-MCNC: 77 MG/DL
TSH SERPL DL<=0.05 MIU/L-ACNC: 2.25 UIU/ML (ref 0.45–4.5)
WBC # BLD AUTO: 6.21 THOUSAND/UL (ref 4.31–10.16)

## 2024-08-09 PROCEDURE — 36415 COLL VENOUS BLD VENIPUNCTURE: CPT

## 2024-08-09 PROCEDURE — 80053 COMPREHEN METABOLIC PANEL: CPT

## 2024-08-09 PROCEDURE — 80061 LIPID PANEL: CPT

## 2024-08-09 PROCEDURE — 83036 HEMOGLOBIN GLYCOSYLATED A1C: CPT

## 2024-08-09 PROCEDURE — G0103 PSA SCREENING: HCPCS

## 2024-08-09 PROCEDURE — 82570 ASSAY OF URINE CREATININE: CPT

## 2024-08-09 PROCEDURE — 82043 UR ALBUMIN QUANTITATIVE: CPT

## 2024-08-09 PROCEDURE — 84443 ASSAY THYROID STIM HORMONE: CPT

## 2024-08-09 PROCEDURE — 85025 COMPLETE CBC W/AUTO DIFF WBC: CPT

## 2024-08-13 ENCOUNTER — OFFICE VISIT (OUTPATIENT)
Dept: FAMILY MEDICINE CLINIC | Facility: CLINIC | Age: 59
End: 2024-08-13
Payer: COMMERCIAL

## 2024-08-13 VITALS
DIASTOLIC BLOOD PRESSURE: 68 MMHG | HEART RATE: 76 BPM | BODY MASS INDEX: 28.31 KG/M2 | WEIGHT: 209 LBS | HEIGHT: 72 IN | SYSTOLIC BLOOD PRESSURE: 148 MMHG | OXYGEN SATURATION: 98 %

## 2024-08-13 DIAGNOSIS — R80.9 MICROALBUMINURIA: ICD-10-CM

## 2024-08-13 DIAGNOSIS — E11.22 TYPE 2 DIABETES MELLITUS WITH STAGE 1 CHRONIC KIDNEY DISEASE, WITHOUT LONG-TERM CURRENT USE OF INSULIN  (HCC): Primary | ICD-10-CM

## 2024-08-13 DIAGNOSIS — I63.10 CEREBROVASCULAR ACCIDENT (CVA) DUE TO EMBOLISM OF PRECEREBRAL ARTERY (HCC): ICD-10-CM

## 2024-08-13 DIAGNOSIS — N18.1 TYPE 2 DIABETES MELLITUS WITH STAGE 1 CHRONIC KIDNEY DISEASE, WITHOUT LONG-TERM CURRENT USE OF INSULIN  (HCC): Primary | ICD-10-CM

## 2024-08-13 DIAGNOSIS — I10 PRIMARY HYPERTENSION: ICD-10-CM

## 2024-08-13 DIAGNOSIS — I48.0 PAROXYSMAL ATRIAL FIBRILLATION (HCC): ICD-10-CM

## 2024-08-13 PROCEDURE — 99214 OFFICE O/P EST MOD 30 MIN: CPT | Performed by: PHYSICIAN ASSISTANT

## 2024-08-13 NOTE — PATIENT INSTRUCTIONS
Assessment/plan:  1.  Type 2 diabetes-hemoglobin A1c of 6.3 with metformin and glimepiride therapy, no medication changes.  Will reassess labs in 6 months.  2.  Paroxysmal atrial fibrillation-stable on Eliquis 5 mg twice daily and rate control with digoxin  3.  Primary hypertension-stable on metoprolol and lisinopril, no medication changes.  4.  History of CVA-stable on Eliquis therapy.  5.  Microalbuminuria-stable with lisinopril therapy 5 mg daily.

## 2024-08-13 NOTE — PROGRESS NOTES
Ambulatory Visit  Name: Priyank Mackenzie      : 1965      MRN: [id number]  Encounter Provider: Kwan Richey PA-C  Encounter Date: 2024   Encounter department: [facility] PRIMARY CARE  Patient Instructions       Assessment/plan:  1.  Type 2 diabetes-hemoglobin A1c of 6.3 with metformin and glimepiride therapy, no medication changes.  Will reassess labs in 6 months.  2.  Paroxysmal atrial fibrillation-stable on Eliquis 5 mg twice daily and rate control with digoxin  3.  Primary hypertension-stable on metoprolol and lisinopril, no medication changes.  4.  History of CVA-stable on Eliquis therapy.  5.  Microalbuminuria-stable with lisinopril therapy 5 mg daily.    Assessment & Plan   1. Type 2 diabetes mellitus with stage 1 chronic kidney disease, without long-term current use of insulin  (HCC)  -     glucose blood test strip; Use 1 each 3 (three) times a day  -     Albumin / creatinine urine ratio; Future; Expected date: 2025  -     Comprehensive metabolic panel; Future; Expected date: 2025  -     Hemoglobin A1C; Future; Expected date: 2025  -     CBC and differential; Future; Expected date: 2025  -     Lipid Panel with Direct LDL reflex; Future; Expected date: 2025  -     TSH, 3rd generation with Free T4 reflex; Future; Expected date: 2025  2. Paroxysmal atrial fibrillation (HCC)  -     Albumin / creatinine urine ratio; Future; Expected date: 2025  -     Comprehensive metabolic panel; Future; Expected date: 2025  -     Hemoglobin A1C; Future; Expected date: 2025  -     CBC and differential; Future; Expected date: 2025  -     Lipid Panel with Direct LDL reflex; Future; Expected date: 2025  -     TSH, 3rd generation with Free T4 reflex; Future; Expected date: 2025  3. Primary hypertension  -     Albumin / creatinine urine ratio; Future; Expected date: 2025  -     Comprehensive metabolic panel; Future; Expected date:  02/09/2025  -     Hemoglobin A1C; Future; Expected date: 02/09/2025  -     CBC and differential; Future; Expected date: 02/09/2025  -     Lipid Panel with Direct LDL reflex; Future; Expected date: 02/09/2025  -     TSH, 3rd generation with Free T4 reflex; Future; Expected date: 02/09/2025  4. Cerebrovascular accident (CVA) due to embolism of precerebral artery (HCC)  -     Albumin / creatinine urine ratio; Future; Expected date: 02/09/2025  -     Comprehensive metabolic panel; Future; Expected date: 02/09/2025  -     Hemoglobin A1C; Future; Expected date: 02/09/2025  -     CBC and differential; Future; Expected date: 02/09/2025  -     Lipid Panel with Direct LDL reflex; Future; Expected date: 02/09/2025  -     TSH, 3rd generation with Free T4 reflex; Future; Expected date: 02/09/2025  5. Microalbuminuria  -     Albumin / creatinine urine ratio; Future; Expected date: 02/09/2025  -     Comprehensive metabolic panel; Future; Expected date: 02/09/2025  -     Hemoglobin A1C; Future; Expected date: 02/09/2025  -     CBC and differential; Future; Expected date: 02/09/2025  -     Lipid Panel with Direct LDL reflex; Future; Expected date: 02/09/2025  -     TSH, 3rd generation with Free T4 reflex; Future; Expected date: 02/09/2025       History of Present Illness     HPI: This is a 59-year-old gentleman that presents to the office for follow-up of chronic health conditions including type 2 diabetes, atrial fibrillation, hypertension, and history of CVA.  He is currently on Eliquis twice daily.  He also continues glimepiride and metformin for his diabetes management within his last hemoglobin A1c was 6.3.  Patient has been feeling well with his current medication regimen.  He has been watching his diet very closely.  He was concerned that his A1c was going to be higher because sometimes he is testing at 200 in the morning.  He is not sure how his sugars are in the evening however.  Patient states that he was on statin therapy  with atorvastatin after his CVA however he was told by one of his specialist to stop the medication.  Patient states that he does not want to go back on it and would like to avoid further medication.        Review of Systems   Constitutional:  Negative for chills, fatigue and fever.   HENT:  Negative for congestion, ear pain and sinus pressure.    Eyes:  Negative for visual disturbance.   Respiratory:  Negative for cough, chest tightness and shortness of breath.    Cardiovascular:  Negative for chest pain and palpitations.   Gastrointestinal:  Negative for diarrhea, nausea and vomiting.   Endocrine: Negative for polyuria.   Genitourinary:  Negative for dysuria and frequency.   Musculoskeletal:  Negative for arthralgias and myalgias.   Skin:  Negative for pallor and rash.   Neurological:  Negative for dizziness, weakness, light-headedness, numbness and headaches.   Psychiatric/Behavioral:  Negative for agitation, behavioral problems and sleep disturbance.    All other systems reviewed and are negative.      Objective     /68 (BP Location: Left arm, Patient Position: Sitting, Cuff Size: Standard)   Pulse 76   Ht 6' (1.829 m)   Wt 94.8 kg (209 lb)   SpO2 98%   BMI 28.35 kg/m²     Physical Exam  Constitutional:       General: He is not in acute distress.     Appearance: He is well-developed.   HENT:      Head: Normocephalic and atraumatic.      Right Ear: Tympanic membrane normal.      Left Ear: Tympanic membrane normal.   Eyes:      Conjunctiva/sclera: Conjunctivae normal.   Cardiovascular:      Rate and Rhythm: Normal rate and regular rhythm.   Pulmonary:      Effort: Pulmonary effort is normal.   Abdominal:      General: Abdomen is flat. Bowel sounds are normal. There is no distension.      Palpations: Abdomen is soft. There is no mass.   Musculoskeletal:         General: Normal range of motion.      Cervical back: Normal range of motion.   Skin:     General: Skin is warm.      Findings: No rash.    Neurological:      Mental Status: He is alert and oriented to person, place, and time.   Psychiatric:         Mood and Affect: Mood normal.       Administrative Statements

## 2024-09-04 DIAGNOSIS — E11.29 CONTROLLED TYPE 2 DIABETES MELLITUS WITH MICROALBUMINURIA, WITHOUT LONG-TERM CURRENT USE OF INSULIN  (HCC): ICD-10-CM

## 2024-09-04 DIAGNOSIS — R80.9 CONTROLLED TYPE 2 DIABETES MELLITUS WITH MICROALBUMINURIA, WITHOUT LONG-TERM CURRENT USE OF INSULIN  (HCC): ICD-10-CM

## 2024-09-04 DIAGNOSIS — I63.519 ACUTE ISCHEMIC CEREBROVASCULAR ACCIDENT (CVA) INVOLVING MIDDLE CEREBRAL ARTERY TERRITORY (HCC): ICD-10-CM

## 2024-09-04 DIAGNOSIS — E11.9 TYPE 2 DIABETES MELLITUS WITHOUT COMPLICATION, UNSPECIFIED WHETHER LONG TERM INSULIN USE (HCC): ICD-10-CM

## 2024-09-04 DIAGNOSIS — I48.21 PERMANENT ATRIAL FIBRILLATION (HCC): ICD-10-CM

## 2024-09-04 RX ORDER — METOPROLOL TARTRATE 25 MG/1
12.5 TABLET, FILM COATED ORAL EVERY 12 HOURS SCHEDULED
Qty: 30 TABLET | Refills: 5 | Status: SHIPPED | OUTPATIENT
Start: 2024-09-04

## 2024-09-04 NOTE — TELEPHONE ENCOUNTER
Reason for call:   [x] Refill   [] Prior Auth  [] Other:     Office:   [x] PCP/Provider - [facility]  [] Specialty/Provider -     Medication: metoprolol tartrate (LOPRESSOR) 25 mg tablet     Dose/Frequency: Take 0.5 tablets (12.5 mg total) by mouth every 12 (twelve) hours     Quantity: 30    Pharmacy: [facility]/pharmacy #[id number] - [place] - [address]     Does the patient have enough for 3 days?   [] Yes   [x] No - Send as HP to POD

## 2024-09-19 ENCOUNTER — APPOINTMENT (OUTPATIENT)
Dept: LAB | Facility: MEDICAL CENTER | Age: 59
End: 2024-09-19
Payer: COMMERCIAL

## 2024-09-19 DIAGNOSIS — I50.9 ACUTE HEART FAILURE, UNSPECIFIED HEART FAILURE TYPE (HCC): ICD-10-CM

## 2024-09-19 DIAGNOSIS — Z51.81 ENCOUNTER FOR THERAPEUTIC DRUG MONITORING: ICD-10-CM

## 2024-09-19 LAB
ANION GAP SERPL CALCULATED.3IONS-SCNC: 7 MMOL/L (ref 4–13)
BUN SERPL-MCNC: 20 MG/DL (ref 5–25)
CALCIUM SERPL-MCNC: 9.2 MG/DL (ref 8.4–10.2)
CHLORIDE SERPL-SCNC: 101 MMOL/L (ref 96–108)
CO2 SERPL-SCNC: 28 MMOL/L (ref 21–32)
CREAT SERPL-MCNC: 1.12 MG/DL (ref 0.6–1.3)
GFR SERPL CREATININE-BSD FRML MDRD: 71 ML/MIN/1.73SQ M
GLUCOSE P FAST SERPL-MCNC: 282 MG/DL (ref 65–99)
POTASSIUM SERPL-SCNC: 4.3 MMOL/L (ref 3.5–5.3)
SODIUM SERPL-SCNC: 136 MMOL/L (ref 135–147)

## 2024-09-19 PROCEDURE — 80048 BASIC METABOLIC PNL TOTAL CA: CPT

## 2024-09-19 PROCEDURE — 36415 COLL VENOUS BLD VENIPUNCTURE: CPT

## 2024-09-29 DIAGNOSIS — E11.9 TYPE 2 DIABETES MELLITUS WITHOUT COMPLICATION, UNSPECIFIED WHETHER LONG TERM INSULIN USE (HCC): ICD-10-CM

## 2024-09-29 DIAGNOSIS — R80.9 CONTROLLED TYPE 2 DIABETES MELLITUS WITH MICROALBUMINURIA, WITHOUT LONG-TERM CURRENT USE OF INSULIN  (HCC): ICD-10-CM

## 2024-09-29 DIAGNOSIS — I48.21 PERMANENT ATRIAL FIBRILLATION (HCC): ICD-10-CM

## 2024-09-29 DIAGNOSIS — I63.519 ACUTE ISCHEMIC CEREBROVASCULAR ACCIDENT (CVA) INVOLVING MIDDLE CEREBRAL ARTERY TERRITORY (HCC): ICD-10-CM

## 2024-09-29 DIAGNOSIS — E11.29 CONTROLLED TYPE 2 DIABETES MELLITUS WITH MICROALBUMINURIA, WITHOUT LONG-TERM CURRENT USE OF INSULIN  (HCC): ICD-10-CM

## 2024-09-30 DIAGNOSIS — I48.20 CHRONIC ATRIAL FIBRILLATION (HCC): ICD-10-CM

## 2024-09-30 DIAGNOSIS — I48.21 PERMANENT ATRIAL FIBRILLATION (HCC): ICD-10-CM

## 2024-09-30 RX ORDER — METOPROLOL TARTRATE 25 MG/1
12.5 TABLET, FILM COATED ORAL EVERY 12 HOURS SCHEDULED
Qty: 90 TABLET | Refills: 1 | Status: SHIPPED | OUTPATIENT
Start: 2024-09-30

## 2024-09-30 RX ORDER — DIGOXIN 250 MCG
250 TABLET ORAL DAILY
Qty: 90 TABLET | Refills: 1 | Status: SHIPPED | OUTPATIENT
Start: 2024-09-30

## 2024-10-01 DIAGNOSIS — I48.0 PAROXYSMAL ATRIAL FIBRILLATION (HCC): Primary | ICD-10-CM

## 2024-10-01 RX ORDER — APIXABAN 5 MG/1
5 TABLET, FILM COATED ORAL 2 TIMES DAILY
Qty: 180 TABLET | Refills: 1 | Status: SHIPPED | OUTPATIENT
Start: 2024-10-01

## 2024-10-01 NOTE — TELEPHONE ENCOUNTER
Reason for call: NOT A DUPLICATE patient is completely out of medication. A 90 day was sent to [facility] today but he needs a short supply sent to local [facility]       [x] Refill   [] Prior Auth  [] Other:     Office:   [] PCP/Provider -   [x] Specialty/Provider -     Medication: apixaban (Eliquis) 5 mg    Dose/Frequency: TAKE 1 TABLET TWICE A DAY     Quantity: 14    Pharmacy: [facility]/pharmacy #[id number] - [place] - [address] [phone]    Does the patient have enough for 3 days?   [] Yes   [x] No - Send as HP to POD

## 2024-10-08 DIAGNOSIS — E11.9 TYPE 2 DIABETES MELLITUS WITHOUT COMPLICATION, UNSPECIFIED WHETHER LONG TERM INSULIN USE (HCC): ICD-10-CM

## 2024-10-08 NOTE — TELEPHONE ENCOUNTER
Reason for call:   [x] Refill   [] Prior Auth  [] Other:     Office:   [x] PCP/Provider -   [] Specialty/Provider -     Medication: METFORMIN    Quantity: 500 MG    Pharmacy:   [facility]/pharmacy #[id number] - [place] - [address]  [address] [address]  Phone: [phone]  Fax: [phone]  ALEJANDRINA #: [id number]     Does the patient have enough for 3 days?   [x] Yes   [] No - Send as HP to POD

## 2024-10-14 ENCOUNTER — RA CDI HCC (OUTPATIENT)
Dept: OTHER | Facility: HOSPITAL | Age: 59
End: 2024-10-14

## 2024-10-14 NOTE — PROGRESS NOTES
HCC coding opportunities          Chart Reviewed number of suggestions sent to Provider: 2     Patients Insurance        Commercial Insurance: [employer] Commercial [employer]       1) I11.0: Hypertensive heart disease with heart failure (HCC)    Per ICD 10 CM coding guidelines the classification presumes a causal relationship between hypertension and heart involvement and between hypertension unless the documentation clearly states the conditions are unrelated    2) Based on clinical documentation indicated in patients chart 5/6/2024, it appears that the patient is diagnosed with I63.10 for Cerebrovascular accident (CVA) due to embolism of precerebral artery (HCC)     From the correct coding standpoint, using the below code for personal history of stroke with no residual effects would be more appropriate:     Z86.73: Personal history of transient ischemic attack, and cerebral infarction without residual deficits      Please review above and assess if applicable

## 2024-10-16 ENCOUNTER — TELEPHONE (OUTPATIENT)
Age: 59
End: 2024-10-16

## 2024-10-16 NOTE — TELEPHONE ENCOUNTER
Pt canceled appt for today. He was last seen in August and would like to know when next follow up should be scheduled. Please advise, thank you.

## 2024-10-17 NOTE — TELEPHONE ENCOUNTER
Patient should follow-up at office visit as soon as possible.  He has had admissions to the hospital with new onset of heart failure.  Recommend follow-up at office as soon as possible again.

## 2024-11-18 DIAGNOSIS — E11.9 TYPE 2 DIABETES MELLITUS WITHOUT COMPLICATION, UNSPECIFIED WHETHER LONG TERM INSULIN USE (HCC): ICD-10-CM

## 2024-11-18 DIAGNOSIS — E11.29 CONTROLLED TYPE 2 DIABETES MELLITUS WITH MICROALBUMINURIA, WITHOUT LONG-TERM CURRENT USE OF INSULIN  (HCC): ICD-10-CM

## 2024-11-18 DIAGNOSIS — I48.21 PERMANENT ATRIAL FIBRILLATION (HCC): ICD-10-CM

## 2024-11-18 DIAGNOSIS — I63.519 ACUTE ISCHEMIC CEREBROVASCULAR ACCIDENT (CVA) INVOLVING MIDDLE CEREBRAL ARTERY TERRITORY (HCC): ICD-10-CM

## 2024-11-18 DIAGNOSIS — R80.9 CONTROLLED TYPE 2 DIABETES MELLITUS WITH MICROALBUMINURIA, WITHOUT LONG-TERM CURRENT USE OF INSULIN  (HCC): ICD-10-CM

## 2024-11-18 DIAGNOSIS — R80.9 MICROALBUMINURIA: ICD-10-CM

## 2024-11-19 RX ORDER — LISINOPRIL 5 MG/1
5 TABLET ORAL DAILY
Qty: 90 TABLET | Refills: 0 | Status: SHIPPED | OUTPATIENT
Start: 2024-11-19

## 2024-11-19 NOTE — TELEPHONE ENCOUNTER
Called pt and left an vm in regards that pt medications have been refilled, pt will need to schedule an appt as needed for next refill.     If pt calls back please schedule as needed.     Thank you.   Hina.

## 2024-11-24 DIAGNOSIS — N18.1 TYPE 2 DIABETES MELLITUS WITH STAGE 1 CHRONIC KIDNEY DISEASE, WITHOUT LONG-TERM CURRENT USE OF INSULIN  (HCC): ICD-10-CM

## 2024-11-24 DIAGNOSIS — E11.22 TYPE 2 DIABETES MELLITUS WITH STAGE 1 CHRONIC KIDNEY DISEASE, WITHOUT LONG-TERM CURRENT USE OF INSULIN  (HCC): ICD-10-CM

## 2024-11-26 RX ORDER — GLIMEPIRIDE 2 MG/1
2 TABLET ORAL
Qty: 90 TABLET | Refills: 1 | Status: SHIPPED | OUTPATIENT
Start: 2024-11-26

## 2024-12-09 ENCOUNTER — APPOINTMENT (OUTPATIENT)
Dept: LAB | Facility: MEDICAL CENTER | Age: 59
End: 2024-12-09
Payer: COMMERCIAL

## 2024-12-09 DIAGNOSIS — E11.29 MICROALBUMINURIA DUE TO TYPE 2 DIABETES MELLITUS  (HCC): ICD-10-CM

## 2024-12-09 DIAGNOSIS — K74.60 CIRRHOSIS OF LIVER WITHOUT ASCITES, UNSPECIFIED HEPATIC CIRRHOSIS TYPE (HCC): ICD-10-CM

## 2024-12-09 DIAGNOSIS — R80.9 MICROALBUMINURIA DUE TO TYPE 2 DIABETES MELLITUS  (HCC): ICD-10-CM

## 2024-12-09 DIAGNOSIS — I48.11 LONGSTANDING PERSISTENT ATRIAL FIBRILLATION (HCC): ICD-10-CM

## 2024-12-09 LAB
ALBUMIN SERPL BCG-MCNC: 4.6 G/DL (ref 3.5–5)
ALP SERPL-CCNC: 53 U/L (ref 34–104)
ALT SERPL W P-5'-P-CCNC: 34 U/L (ref 7–52)
ANION GAP SERPL CALCULATED.3IONS-SCNC: 9 MMOL/L (ref 4–13)
AST SERPL W P-5'-P-CCNC: 21 U/L (ref 13–39)
BASOPHILS # BLD AUTO: 0.02 THOUSANDS/ÂΜL (ref 0–0.1)
BASOPHILS NFR BLD AUTO: 0 % (ref 0–1)
BILIRUB SERPL-MCNC: 1.5 MG/DL (ref 0.2–1)
BUN SERPL-MCNC: 28 MG/DL (ref 5–25)
CALCIUM SERPL-MCNC: 9.3 MG/DL (ref 8.4–10.2)
CHLORIDE SERPL-SCNC: 101 MMOL/L (ref 96–108)
CHOLEST SERPL-MCNC: 102 MG/DL (ref ?–200)
CO2 SERPL-SCNC: 28 MMOL/L (ref 21–32)
CREAT SERPL-MCNC: 1.22 MG/DL (ref 0.6–1.3)
CREAT UR-MCNC: 133.5 MG/DL
EOSINOPHIL # BLD AUTO: 0.12 THOUSAND/ÂΜL (ref 0–0.61)
EOSINOPHIL NFR BLD AUTO: 2 % (ref 0–6)
ERYTHROCYTE [DISTWIDTH] IN BLOOD BY AUTOMATED COUNT: 15.9 % (ref 11.6–15.1)
EST. AVERAGE GLUCOSE BLD GHB EST-MCNC: 108 MG/DL
GFR SERPL CREATININE-BSD FRML MDRD: 64 ML/MIN/1.73SQ M
GLUCOSE P FAST SERPL-MCNC: 173 MG/DL (ref 65–99)
HBA1C MFR BLD: 5.4 %
HCT VFR BLD AUTO: 37 % (ref 36.5–49.3)
HDLC SERPL-MCNC: 29 MG/DL
HGB BLD-MCNC: 12.8 G/DL (ref 12–17)
IMM GRANULOCYTES # BLD AUTO: 0.03 THOUSAND/UL (ref 0–0.2)
IMM GRANULOCYTES NFR BLD AUTO: 1 % (ref 0–2)
LDLC SERPL CALC-MCNC: 56 MG/DL (ref 0–100)
LYMPHOCYTES # BLD AUTO: 0.97 THOUSANDS/ÂΜL (ref 0.6–4.47)
LYMPHOCYTES NFR BLD AUTO: 15 % (ref 14–44)
MCH RBC QN AUTO: 32.2 PG (ref 26.8–34.3)
MCHC RBC AUTO-ENTMCNC: 34.6 G/DL (ref 31.4–37.4)
MCV RBC AUTO: 93 FL (ref 82–98)
MICROALBUMIN UR-MCNC: 119.7 MG/L
MICROALBUMIN/CREAT 24H UR: 90 MG/G CREATININE (ref 0–30)
MONOCYTES # BLD AUTO: 0.56 THOUSAND/ÂΜL (ref 0.17–1.22)
MONOCYTES NFR BLD AUTO: 8 % (ref 4–12)
NEUTROPHILS # BLD AUTO: 4.95 THOUSANDS/ÂΜL (ref 1.85–7.62)
NEUTS SEG NFR BLD AUTO: 74 % (ref 43–75)
NONHDLC SERPL-MCNC: 73 MG/DL
NRBC BLD AUTO-RTO: 0 /100 WBCS
PLATELET # BLD AUTO: 111 THOUSANDS/UL (ref 149–390)
PMV BLD AUTO: 10.6 FL (ref 8.9–12.7)
POTASSIUM SERPL-SCNC: 4.3 MMOL/L (ref 3.5–5.3)
PROT SERPL-MCNC: 6.7 G/DL (ref 6.4–8.4)
RBC # BLD AUTO: 3.98 MILLION/UL (ref 3.88–5.62)
SODIUM SERPL-SCNC: 138 MMOL/L (ref 135–147)
TRIGL SERPL-MCNC: 84 MG/DL (ref ?–150)
WBC # BLD AUTO: 6.65 THOUSAND/UL (ref 4.31–10.16)

## 2024-12-09 PROCEDURE — 85025 COMPLETE CBC W/AUTO DIFF WBC: CPT

## 2024-12-09 PROCEDURE — 80061 LIPID PANEL: CPT

## 2024-12-11 ENCOUNTER — RESULTS FOLLOW-UP (OUTPATIENT)
Dept: FAMILY MEDICINE CLINIC | Facility: CLINIC | Age: 59
End: 2024-12-11

## 2024-12-13 ENCOUNTER — TELEPHONE (OUTPATIENT)
Age: 59
End: 2024-12-13

## 2024-12-13 NOTE — TELEPHONE ENCOUNTER
Ning Briones, nurse  at [facility], called to provide her contact info as she is attempting to reach out to patient to get him on the case management program. If pt or office needs any assistance from [facility], she can be reached back at [phone] ext. [phone]. Thank you.

## 2025-01-06 DIAGNOSIS — E11.9 TYPE 2 DIABETES MELLITUS WITHOUT COMPLICATION, UNSPECIFIED WHETHER LONG TERM INSULIN USE (HCC): ICD-10-CM

## 2025-02-04 ENCOUNTER — TELEPHONE (OUTPATIENT)
Age: 60
End: 2025-02-04

## 2025-02-04 NOTE — TELEPHONE ENCOUNTER
[facility] case management     [facility] [phone] ext [phone]

## 2025-02-17 DIAGNOSIS — E11.9 TYPE 2 DIABETES MELLITUS WITHOUT COMPLICATION, UNSPECIFIED WHETHER LONG TERM INSULIN USE (HCC): ICD-10-CM

## 2025-02-17 DIAGNOSIS — I63.519 ACUTE ISCHEMIC CEREBROVASCULAR ACCIDENT (CVA) INVOLVING MIDDLE CEREBRAL ARTERY TERRITORY (HCC): ICD-10-CM

## 2025-02-17 DIAGNOSIS — I48.21 PERMANENT ATRIAL FIBRILLATION (HCC): ICD-10-CM

## 2025-02-17 DIAGNOSIS — R80.9 CONTROLLED TYPE 2 DIABETES MELLITUS WITH MICROALBUMINURIA, WITHOUT LONG-TERM CURRENT USE OF INSULIN  (HCC): ICD-10-CM

## 2025-02-17 DIAGNOSIS — R80.9 MICROALBUMINURIA: ICD-10-CM

## 2025-02-17 DIAGNOSIS — E11.29 CONTROLLED TYPE 2 DIABETES MELLITUS WITH MICROALBUMINURIA, WITHOUT LONG-TERM CURRENT USE OF INSULIN  (HCC): ICD-10-CM

## 2025-02-17 NOTE — TELEPHONE ENCOUNTER
Requested Prescriptions     Pending Prescriptions Disp Refills    lisinopril (ZESTRIL) 5 mg tablet [Pharmacy Med Name: LISINOPRIL TABS 5MG] 90 tablet 3     Sig: TAKE 1 TABLET DAILY

## 2025-02-18 RX ORDER — LISINOPRIL 5 MG/1
5 TABLET ORAL DAILY
Qty: 90 TABLET | Refills: 3 | OUTPATIENT
Start: 2025-02-18

## 2025-02-18 NOTE — TELEPHONE ENCOUNTER
Patient appears to be following with PCP at [facility] now.  Has had 2 visits there.  Until he has a visit here, I would not refill medications.

## 2025-02-19 ENCOUNTER — TELEPHONE (OUTPATIENT)
Dept: FAMILY MEDICINE CLINIC | Facility: CLINIC | Age: 60
End: 2025-02-19

## 2025-02-19 NOTE — TELEPHONE ENCOUNTER
On 9/13/2024 patient established care with Temo Carty MD   [address]   [address]   [place] [address] [phone] (Work)   [phone] (Fax)

## 2025-02-25 NOTE — TELEPHONE ENCOUNTER
02/24/25 9:29 PM        The office's request has been received, reviewed, and the patient chart updated. The PCP has successfully been removed with a patient attribution note. This message will now be completed.        Thank you  Sal Orlando

## 2025-03-31 DIAGNOSIS — I48.21 PERMANENT ATRIAL FIBRILLATION (HCC): ICD-10-CM

## 2025-03-31 RX ORDER — DIGOXIN 250 MCG
250 TABLET ORAL DAILY
Qty: 90 TABLET | Refills: 3 | Status: SHIPPED | OUTPATIENT
Start: 2025-03-31

## 2025-04-05 DIAGNOSIS — E11.22 TYPE 2 DIABETES MELLITUS WITH STAGE 1 CHRONIC KIDNEY DISEASE, WITHOUT LONG-TERM CURRENT USE OF INSULIN  (HCC): ICD-10-CM

## 2025-04-05 DIAGNOSIS — N18.1 TYPE 2 DIABETES MELLITUS WITH STAGE 1 CHRONIC KIDNEY DISEASE, WITHOUT LONG-TERM CURRENT USE OF INSULIN  (HCC): ICD-10-CM

## 2025-04-07 RX ORDER — GLIMEPIRIDE 2 MG/1
2 TABLET ORAL
Qty: 90 TABLET | Refills: 1 | OUTPATIENT
Start: 2025-04-07

## 2025-05-30 ENCOUNTER — TELEPHONE (OUTPATIENT)
Age: 60
End: 2025-05-30

## 2025-05-30 NOTE — TELEPHONE ENCOUNTER
Patient called to inform that he has a new Primary Care Provider at [facility], Dr. Lc Tejeda.

## 2025-07-07 DIAGNOSIS — E11.9 TYPE 2 DIABETES MELLITUS WITHOUT COMPLICATION, UNSPECIFIED WHETHER LONG TERM INSULIN USE (HCC): ICD-10-CM

## 2025-07-08 RX ORDER — OMEPRAZOLE 20 MG/1
20 CAPSULE, DELAYED RELEASE ORAL DAILY
Qty: 90 CAPSULE | Refills: 3 | OUTPATIENT
Start: 2025-07-08